# Patient Record
Sex: FEMALE | Race: WHITE | Employment: FULL TIME | RURAL
[De-identification: names, ages, dates, MRNs, and addresses within clinical notes are randomized per-mention and may not be internally consistent; named-entity substitution may affect disease eponyms.]

---

## 2017-02-16 ENCOUNTER — OFFICE VISIT (OUTPATIENT)
Dept: PRIMARY CARE CLINIC | Age: 47
End: 2017-02-16
Payer: COMMERCIAL

## 2017-02-16 ENCOUNTER — HOSPITAL ENCOUNTER (OUTPATIENT)
Dept: OTHER | Age: 47
Discharge: HOME | End: 2017-02-16
Attending: PEDIATRICS | Admitting: PEDIATRICS

## 2017-02-16 ENCOUNTER — TELEPHONE (OUTPATIENT)
Dept: PRIMARY CARE CLINIC | Age: 47
End: 2017-02-16

## 2017-02-16 VITALS
DIASTOLIC BLOOD PRESSURE: 72 MMHG | HEART RATE: 78 BPM | WEIGHT: 183.25 LBS | SYSTOLIC BLOOD PRESSURE: 126 MMHG | RESPIRATION RATE: 20 BRPM | OXYGEN SATURATION: 97 % | HEIGHT: 64 IN | BODY MASS INDEX: 31.28 KG/M2

## 2017-02-16 DIAGNOSIS — L30.8 PSORIASIFORM DERMATITIS: ICD-10-CM

## 2017-02-16 DIAGNOSIS — L50.9 URTICARIA: ICD-10-CM

## 2017-02-16 DIAGNOSIS — R45.1 AGITATION: ICD-10-CM

## 2017-02-16 DIAGNOSIS — F31.12 BIPOLAR 1 DISORDER, MANIC, MODERATE (HCC): Primary | ICD-10-CM

## 2017-02-16 DIAGNOSIS — G47.00 INSOMNIA, UNSPECIFIED TYPE: ICD-10-CM

## 2017-02-16 LAB
A/G RATIO: 2 (ref 0.8–2)
ALBUMIN SERPL-MCNC: 4.5 G/DL (ref 3.4–4.8)
ALP BLD-CCNC: 88 U/L (ref 25–100)
ALT SERPL-CCNC: 16 U/L (ref 4–36)
ANION GAP SERPL CALCULATED.3IONS-SCNC: 12 MMOL/L (ref 3–16)
AST SERPL-CCNC: 15 U/L (ref 8–33)
BASOPHILS ABSOLUTE: 0.1 K/UL (ref 0–0.1)
BASOPHILS RELATIVE PERCENT: 0.3 %
BILIRUB SERPL-MCNC: <0.2 MG/DL (ref 0.3–1.2)
BUN BLDV-MCNC: 12 MG/DL (ref 6–20)
CALCIUM SERPL-MCNC: 9.9 MG/DL (ref 8.5–10.5)
CHLORIDE BLD-SCNC: 98 MMOL/L (ref 98–107)
CHOLESTEROL, TOTAL: 204 MG/DL (ref 0–200)
CO2: 28 MMOL/L (ref 20–30)
CREAT SERPL-MCNC: 0.8 MG/DL (ref 0.4–1.2)
EOSINOPHILS ABSOLUTE: 0.4 K/UL (ref 0–0.4)
EOSINOPHILS RELATIVE PERCENT: 2.5 %
GFR AFRICAN AMERICAN: >59
GFR NON-AFRICAN AMERICAN: >60
GLOBULIN: 2.3 G/DL
GLUCOSE BLD-MCNC: 107 MG/DL (ref 74–106)
HCT VFR BLD CALC: 42 % (ref 37–47)
HDLC SERPL-MCNC: 28 MG/DL (ref 40–60)
HEMOGLOBIN: 13.3 G/DL (ref 11.5–16.5)
LDL CHOLESTEROL CALCULATED: ABNORMAL MG/DL
LDL CHOLESTEROL DIRECT: 131 MG/DL
LYMPHOCYTES ABSOLUTE: 3.8 K/UL (ref 1.5–4)
LYMPHOCYTES RELATIVE PERCENT: 26.3 % (ref 20–40)
MCH RBC QN AUTO: 29.4 PG (ref 27–32)
MCHC RBC AUTO-ENTMCNC: 31.7 G/DL (ref 31–35)
MCV RBC AUTO: 92.9 FL (ref 80–100)
MONOCYTES ABSOLUTE: 0.7 K/UL (ref 0.2–0.8)
MONOCYTES RELATIVE PERCENT: 5 % (ref 3–10)
NEUTROPHILS ABSOLUTE: 9.6 K/UL (ref 2–7.5)
NEUTROPHILS RELATIVE PERCENT: 65.9 %
PDW BLD-RTO: 14.1 % (ref 11–16)
PLATELET # BLD: 408 K/UL (ref 150–400)
PMV BLD AUTO: 10 FL (ref 6–10)
POTASSIUM SERPL-SCNC: 4.5 MMOL/L (ref 3.4–5.1)
RBC # BLD: 4.52 M/UL (ref 3.8–5.8)
SODIUM BLD-SCNC: 138 MMOL/L (ref 136–145)
TOTAL PROTEIN: 6.8 G/DL (ref 6.4–8.3)
TRIGL SERPL-MCNC: 432 MG/DL (ref 0–249)
TSH REFLEX: 0.48 UIU/ML (ref 0.35–5.5)
VLDLC SERPL CALC-MCNC: ABNORMAL MG/DL
WBC # BLD: 14.6 K/UL (ref 4–11)

## 2017-02-16 PROCEDURE — 99203 OFFICE O/P NEW LOW 30 MIN: CPT | Performed by: PEDIATRICS

## 2017-02-16 RX ORDER — FLUOXETINE HYDROCHLORIDE 20 MG/1
CAPSULE ORAL
COMMUNITY
Start: 2016-10-05 | End: 2017-02-16

## 2017-02-16 RX ORDER — SPIRONOLACTONE 25 MG/1
25 TABLET ORAL
COMMUNITY
Start: 2016-06-08 | End: 2017-03-09

## 2017-02-16 RX ORDER — TRIAMCINOLONE ACETONIDE 1 MG/G
CREAM TOPICAL
Qty: 30 G | Refills: 0 | Status: SHIPPED | OUTPATIENT
Start: 2017-02-16

## 2017-02-16 RX ORDER — BISOPROLOL FUMARATE 5 MG/1
TABLET ORAL
COMMUNITY
Start: 2016-06-10

## 2017-02-16 RX ORDER — CLONIDINE HYDROCHLORIDE 0.1 MG/1
0.1 TABLET ORAL 2 TIMES DAILY PRN
Qty: 60 TABLET | Refills: 3 | Status: SHIPPED | OUTPATIENT
Start: 2017-02-16 | End: 2017-06-11 | Stop reason: SDUPTHER

## 2017-02-16 RX ORDER — OXYCODONE AND ACETAMINOPHEN 7.5; 325 MG/1; MG/1
1 TABLET ORAL
COMMUNITY
Start: 2016-05-19 | End: 2017-03-09

## 2017-02-16 RX ORDER — HYDROXYZINE HYDROCHLORIDE 25 MG/1
25 TABLET, FILM COATED ORAL EVERY 8 HOURS PRN
Qty: 90 TABLET | Refills: 2 | Status: SHIPPED | OUTPATIENT
Start: 2017-02-16 | End: 2017-05-16 | Stop reason: SDUPTHER

## 2017-02-16 RX ORDER — OLANZAPINE AND FLUOXETINE 6; 50 MG/1; MG/1
1 CAPSULE ORAL NIGHTLY
Qty: 30 CAPSULE | Refills: 3 | Status: SHIPPED | OUTPATIENT
Start: 2017-02-16 | End: 2017-02-20

## 2017-02-20 DIAGNOSIS — F31.10 BIPOLAR DISORDER, MANIC (HCC): Primary | ICD-10-CM

## 2017-02-20 RX ORDER — OLANZAPINE 5 MG/1
5 TABLET ORAL NIGHTLY
Qty: 30 TABLET | Refills: 3 | Status: SHIPPED | OUTPATIENT
Start: 2017-02-20 | End: 2017-03-10 | Stop reason: SDUPTHER

## 2017-02-25 ASSESSMENT — ENCOUNTER SYMPTOMS
ABDOMINAL PAIN: 0
NAUSEA: 0
BACK PAIN: 0
VOMITING: 0
SINUS PRESSURE: 0
COUGH: 0
EYE DISCHARGE: 0
SORE THROAT: 0
WHEEZING: 0
SHORTNESS OF BREATH: 0

## 2017-02-28 ENCOUNTER — TELEPHONE (OUTPATIENT)
Dept: PRIMARY CARE CLINIC | Age: 47
End: 2017-02-28

## 2017-03-09 ENCOUNTER — OFFICE VISIT (OUTPATIENT)
Dept: PRIMARY CARE CLINIC | Age: 47
End: 2017-03-09
Payer: COMMERCIAL

## 2017-03-09 VITALS
HEART RATE: 93 BPM | OXYGEN SATURATION: 96 % | SYSTOLIC BLOOD PRESSURE: 124 MMHG | BODY MASS INDEX: 33.02 KG/M2 | HEIGHT: 64 IN | DIASTOLIC BLOOD PRESSURE: 76 MMHG | WEIGHT: 193.38 LBS | RESPIRATION RATE: 20 BRPM

## 2017-03-09 DIAGNOSIS — R60.0 EDEMA OF BOTH FEET: Primary | ICD-10-CM

## 2017-03-09 DIAGNOSIS — F31.10 BIPOLAR DISORDER, MANIC (HCC): ICD-10-CM

## 2017-03-09 DIAGNOSIS — E78.2 HYPERLIPIDEMIA, MIXED: ICD-10-CM

## 2017-03-09 DIAGNOSIS — E88.81 METABOLIC SYNDROME: ICD-10-CM

## 2017-03-09 LAB — HBA1C MFR BLD: 5.8 %

## 2017-03-09 PROCEDURE — 99213 OFFICE O/P EST LOW 20 MIN: CPT | Performed by: PEDIATRICS

## 2017-03-09 PROCEDURE — 83036 HEMOGLOBIN GLYCOSYLATED A1C: CPT | Performed by: PEDIATRICS

## 2017-03-09 RX ORDER — CLOBETASOL PROPIONATE 0.5 MG/G
OINTMENT TOPICAL
Refills: 0 | COMMUNITY
Start: 2017-03-07

## 2017-03-09 RX ORDER — CLOBETASOL PROPIONATE 0.05 G/100ML
SHAMPOO TOPICAL
COMMUNITY
Start: 2017-03-07

## 2017-03-09 RX ORDER — HYDROCHLOROTHIAZIDE 25 MG/1
25 TABLET ORAL DAILY
Qty: 30 TABLET | Refills: 3 | Status: SHIPPED | OUTPATIENT
Start: 2017-03-09 | End: 2017-07-03 | Stop reason: SDUPTHER

## 2017-03-09 RX ORDER — FUROSEMIDE 20 MG/1
20 TABLET ORAL DAILY PRN
Qty: 15 TABLET | Refills: 1 | Status: SHIPPED | OUTPATIENT
Start: 2017-03-09

## 2017-03-10 PROBLEM — E78.2 HYPERLIPIDEMIA, MIXED: Status: ACTIVE | Noted: 2017-03-10

## 2017-03-10 PROBLEM — E88.81 METABOLIC SYNDROME: Status: ACTIVE | Noted: 2017-03-10

## 2017-03-10 PROBLEM — E88.810 METABOLIC SYNDROME: Status: ACTIVE | Noted: 2017-03-10

## 2017-03-10 PROBLEM — R60.0 EDEMA OF BOTH FEET: Status: ACTIVE | Noted: 2017-03-10

## 2017-03-10 RX ORDER — OLANZAPINE 5 MG/1
2.5 TABLET ORAL NIGHTLY
Qty: 30 TABLET | Refills: 3 | Status: SHIPPED | OUTPATIENT
Start: 2017-03-10 | End: 2017-04-17

## 2017-03-10 ASSESSMENT — ENCOUNTER SYMPTOMS
NAUSEA: 0
WHEEZING: 0
EYE DISCHARGE: 0
SINUS PRESSURE: 0
ABDOMINAL PAIN: 0
SHORTNESS OF BREATH: 0
BACK PAIN: 0
VOMITING: 0
COUGH: 0
SORE THROAT: 0

## 2017-04-10 ENCOUNTER — HOSPITAL ENCOUNTER (OUTPATIENT)
Dept: OTHER | Age: 47
Discharge: HOME | End: 2017-04-10
Attending: PEDIATRICS | Admitting: PEDIATRICS

## 2017-04-10 ENCOUNTER — OFFICE VISIT (OUTPATIENT)
Dept: PRIMARY CARE CLINIC | Age: 47
End: 2017-04-10
Payer: COMMERCIAL

## 2017-04-10 VITALS
HEART RATE: 81 BPM | HEIGHT: 64 IN | SYSTOLIC BLOOD PRESSURE: 111 MMHG | OXYGEN SATURATION: 98 % | RESPIRATION RATE: 20 BRPM | DIASTOLIC BLOOD PRESSURE: 64 MMHG | WEIGHT: 192 LBS | BODY MASS INDEX: 32.78 KG/M2

## 2017-04-10 DIAGNOSIS — E78.2 HYPERLIPIDEMIA, MIXED: ICD-10-CM

## 2017-04-10 DIAGNOSIS — M25.471: ICD-10-CM

## 2017-04-10 DIAGNOSIS — M54.5 CHRONIC BILATERAL LOW BACK PAIN, WITH SCIATICA PRESENCE UNSPECIFIED: Primary | ICD-10-CM

## 2017-04-10 DIAGNOSIS — G89.29 CHRONIC BILATERAL LOW BACK PAIN, WITH SCIATICA PRESENCE UNSPECIFIED: Primary | ICD-10-CM

## 2017-04-10 PROBLEM — M54.50 CHRONIC BILATERAL LOW BACK PAIN: Status: ACTIVE | Noted: 2017-04-10

## 2017-04-10 PROCEDURE — 99213 OFFICE O/P EST LOW 20 MIN: CPT | Performed by: PEDIATRICS

## 2017-04-10 RX ORDER — LURASIDONE HYDROCHLORIDE 60 MG/1
TABLET, FILM COATED ORAL
COMMUNITY
Start: 2017-04-07

## 2017-04-11 LAB
BASOPHILS ABSOLUTE: 0.1 K/UL (ref 0–0.1)
BASOPHILS RELATIVE PERCENT: 0.7 %
CHOLESTEROL, TOTAL: 199 MG/DL (ref 0–200)
EOSINOPHILS ABSOLUTE: 0.4 K/UL (ref 0–0.4)
EOSINOPHILS RELATIVE PERCENT: 2.8 %
HCT VFR BLD CALC: 40.2 % (ref 37–47)
HDLC SERPL-MCNC: 28 MG/DL (ref 40–60)
HEMOGLOBIN: 13.2 G/DL (ref 11.5–16.5)
LDL CHOLESTEROL CALCULATED: 113 MG/DL
LYMPHOCYTES ABSOLUTE: 4 K/UL (ref 1.5–4)
LYMPHOCYTES RELATIVE PERCENT: 27.8 % (ref 20–40)
MCH RBC QN AUTO: 30.3 PG (ref 27–32)
MCHC RBC AUTO-ENTMCNC: 32.8 G/DL (ref 31–35)
MCV RBC AUTO: 92.4 FL (ref 80–100)
MONOCYTES ABSOLUTE: 0.8 K/UL (ref 0.2–0.8)
MONOCYTES RELATIVE PERCENT: 5.8 % (ref 3–10)
NEUTROPHILS ABSOLUTE: 9.1 K/UL (ref 2–7.5)
NEUTROPHILS RELATIVE PERCENT: 62.9 %
PDW BLD-RTO: 13.1 % (ref 11–16)
PLATELET # BLD: 438 K/UL (ref 150–400)
PMV BLD AUTO: 10.2 FL (ref 6–10)
RBC # BLD: 4.35 M/UL (ref 3.8–5.8)
SEDIMENTATION RATE, ERYTHROCYTE: 17 MM/HR (ref 0–20)
TRIGL SERPL-MCNC: 290 MG/DL (ref 0–249)
URIC ACID, SERUM: 7.6 MG/DL (ref 2.5–7.1)
VLDLC SERPL CALC-MCNC: 58 MG/DL
WBC # BLD: 14.4 K/UL (ref 4–11)

## 2017-04-14 ASSESSMENT — ENCOUNTER SYMPTOMS
BACK PAIN: 0
COUGH: 0
EYE DISCHARGE: 0
VOMITING: 0
WHEEZING: 0
SHORTNESS OF BREATH: 0
SINUS PRESSURE: 0
NAUSEA: 0
ABDOMINAL PAIN: 0
SORE THROAT: 0

## 2017-05-16 DIAGNOSIS — L50.9 URTICARIA: ICD-10-CM

## 2017-05-16 RX ORDER — HYDROXYZINE HYDROCHLORIDE 25 MG/1
TABLET, FILM COATED ORAL
Qty: 90 TABLET | Refills: 1 | Status: SHIPPED | OUTPATIENT
Start: 2017-05-16 | End: 2017-09-05 | Stop reason: SDUPTHER

## 2017-06-11 DIAGNOSIS — R45.1 AGITATION: ICD-10-CM

## 2017-06-12 RX ORDER — CLONIDINE HYDROCHLORIDE 0.1 MG/1
TABLET ORAL
Qty: 60 TABLET | Refills: 2 | Status: SHIPPED | OUTPATIENT
Start: 2017-06-12

## 2017-08-03 DIAGNOSIS — R60.0 EDEMA OF BOTH FEET: ICD-10-CM

## 2017-08-07 RX ORDER — HYDROCHLOROTHIAZIDE 25 MG/1
TABLET ORAL
Qty: 30 TABLET | Refills: 0 | Status: SHIPPED | OUTPATIENT
Start: 2017-08-07 | End: 2017-09-05 | Stop reason: SDUPTHER

## 2017-09-02 DIAGNOSIS — R60.0 EDEMA OF BOTH FEET: ICD-10-CM

## 2017-09-02 DIAGNOSIS — L50.9 URTICARIA: ICD-10-CM

## 2017-09-05 RX ORDER — HYDROXYZINE HYDROCHLORIDE 25 MG/1
TABLET, FILM COATED ORAL
Qty: 90 TABLET | Refills: 1 | Status: SHIPPED | OUTPATIENT
Start: 2017-09-05

## 2017-09-05 RX ORDER — HYDROCHLOROTHIAZIDE 25 MG/1
TABLET ORAL
Qty: 30 TABLET | Refills: 0 | Status: SHIPPED | OUTPATIENT
Start: 2017-09-05

## 2017-10-02 DIAGNOSIS — R60.0 EDEMA OF BOTH FEET: ICD-10-CM

## 2017-10-03 RX ORDER — HYDROCHLOROTHIAZIDE 25 MG/1
TABLET ORAL
Qty: 30 TABLET | Refills: 0 | Status: SHIPPED | OUTPATIENT
Start: 2017-10-03

## 2017-10-06 DIAGNOSIS — R45.1 AGITATION: ICD-10-CM

## 2017-10-07 RX ORDER — CLONIDINE HYDROCHLORIDE 0.1 MG/1
TABLET ORAL
Qty: 60 TABLET | Refills: 2 | Status: SHIPPED | OUTPATIENT
Start: 2017-10-07

## 2018-03-19 ENCOUNTER — TRANSCRIBE ORDERS (OUTPATIENT)
Dept: ADMINISTRATIVE | Facility: HOSPITAL | Age: 48
End: 2018-03-19

## 2018-03-19 DIAGNOSIS — R60.9 EDEMA, UNSPECIFIED TYPE: ICD-10-CM

## 2018-03-19 DIAGNOSIS — Z12.39 SCREENING BREAST EXAMINATION: Primary | ICD-10-CM

## 2018-03-22 ENCOUNTER — APPOINTMENT (OUTPATIENT)
Dept: CARDIOLOGY | Facility: HOSPITAL | Age: 48
End: 2018-03-22

## 2018-03-22 ENCOUNTER — APPOINTMENT (OUTPATIENT)
Dept: ULTRASOUND IMAGING | Facility: HOSPITAL | Age: 48
End: 2018-03-22

## 2018-04-19 ENCOUNTER — TRANSCRIBE ORDERS (OUTPATIENT)
Dept: ADMINISTRATIVE | Facility: HOSPITAL | Age: 48
End: 2018-04-19

## 2018-04-19 DIAGNOSIS — Z12.39 SCREENING BREAST EXAMINATION: Primary | ICD-10-CM

## 2018-05-07 ENCOUNTER — APPOINTMENT (OUTPATIENT)
Dept: ULTRASOUND IMAGING | Facility: HOSPITAL | Age: 48
End: 2018-05-07

## 2018-05-07 ENCOUNTER — APPOINTMENT (OUTPATIENT)
Dept: MAMMOGRAPHY | Facility: HOSPITAL | Age: 48
End: 2018-05-07

## 2018-05-22 ENCOUNTER — HOSPITAL ENCOUNTER (OUTPATIENT)
Dept: MAMMOGRAPHY | Facility: HOSPITAL | Age: 48
Discharge: HOME | End: 2018-05-22
Admitting: FAMILY MEDICINE

## 2018-05-22 DIAGNOSIS — Z12.39 SCREENING BREAST EXAMINATION: ICD-10-CM

## 2018-05-22 PROCEDURE — 77067 SCR MAMMO BI INCL CAD: CPT

## 2018-05-22 PROCEDURE — 77063 BREAST TOMOSYNTHESIS BI: CPT

## 2019-01-14 ENCOUNTER — TRANSCRIBE ORDERS (OUTPATIENT)
Dept: LAB | Facility: HOSPITAL | Age: 49
End: 2019-01-14

## 2019-01-14 ENCOUNTER — LAB (OUTPATIENT)
Dept: LAB | Facility: HOSPITAL | Age: 49
End: 2019-01-14

## 2019-01-14 ENCOUNTER — HOSPITAL ENCOUNTER (OUTPATIENT)
Dept: GENERAL RADIOLOGY | Facility: HOSPITAL | Age: 49
Discharge: HOME | End: 2019-01-14
Admitting: PHYSICIAN ASSISTANT

## 2019-01-14 DIAGNOSIS — L40.0 PSORIASIS VULGARIS: ICD-10-CM

## 2019-01-14 DIAGNOSIS — Z51.81 MEDICATION MONITORING ENCOUNTER: ICD-10-CM

## 2019-01-14 DIAGNOSIS — L40.0 PSORIASIS VULGARIS: Primary | ICD-10-CM

## 2019-01-14 LAB
ALBUMIN SERPL-MCNC: 4.6 G/DL (ref 3.5–5)
ALP SERPL-CCNC: 86 U/L (ref 38–126)
ALT SERPL W P-5'-P-CCNC: 22 U/L (ref 13–69)
AST SERPL-CCNC: 18 U/L (ref 15–46)
BASOPHILS # BLD AUTO: 0.1 10*3/MM3 (ref 0–0.2)
BASOPHILS NFR BLD AUTO: 0.7 % (ref 0–2.5)
BILIRUB CONJ SERPL-MCNC: 0.3 MG/DL (ref 0–0.4)
BILIRUB INDIRECT SERPL-MCNC: 0.2 MG/DL
BILIRUB SERPL-MCNC: 0.5 MG/DL (ref 0.2–1.3)
DEPRECATED RDW RBC AUTO: 45.6 FL (ref 37–54)
EOSINOPHIL # BLD AUTO: 0.44 10*3/MM3 (ref 0–0.7)
EOSINOPHIL NFR BLD AUTO: 2.9 % (ref 0–7)
ERYTHROCYTE [DISTWIDTH] IN BLOOD BY AUTOMATED COUNT: 12.5 % (ref 11.5–14.5)
HCT VFR BLD AUTO: 41.4 % (ref 37–47)
HGB BLD-MCNC: 13.1 G/DL (ref 12–16)
IMM GRANULOCYTES # BLD AUTO: 0.09 10*3/MM3 (ref 0–0.06)
IMM GRANULOCYTES NFR BLD AUTO: 0.6 % (ref 0–0.6)
LYMPHOCYTES # BLD AUTO: 2.59 10*3/MM3 (ref 0.6–3.4)
LYMPHOCYTES NFR BLD AUTO: 17.1 % (ref 10–50)
MCH RBC QN AUTO: 31.3 PG (ref 27–31)
MCHC RBC AUTO-ENTMCNC: 31.6 G/DL (ref 30–37)
MCV RBC AUTO: 99 FL (ref 81–99)
MONOCYTES # BLD AUTO: 0.62 10*3/MM3 (ref 0–0.9)
MONOCYTES NFR BLD AUTO: 4.1 % (ref 0–12)
NEUTROPHILS # BLD AUTO: 11.34 10*3/MM3 (ref 2–6.9)
NEUTROPHILS NFR BLD AUTO: 74.6 % (ref 37–80)
NRBC BLD AUTO-RTO: 0 /100 WBC (ref 0–0)
PLATELET # BLD AUTO: 359 10*3/MM3 (ref 130–400)
PMV BLD AUTO: 9.8 FL (ref 6–12)
PROT SERPL-MCNC: 7.3 G/DL (ref 6.3–8.2)
RBC # BLD AUTO: 4.18 10*6/MM3 (ref 4.2–5.4)
WBC NRBC COR # BLD: 15.18 10*3/MM3 (ref 4.8–10.8)

## 2019-01-14 PROCEDURE — 36415 COLL VENOUS BLD VENIPUNCTURE: CPT

## 2019-01-14 PROCEDURE — 86480 TB TEST CELL IMMUN MEASURE: CPT

## 2019-01-14 PROCEDURE — 85025 COMPLETE CBC W/AUTO DIFF WBC: CPT

## 2019-01-14 PROCEDURE — 86706 HEP B SURFACE ANTIBODY: CPT

## 2019-01-14 PROCEDURE — 87340 HEPATITIS B SURFACE AG IA: CPT

## 2019-01-14 PROCEDURE — 71046 X-RAY EXAM CHEST 2 VIEWS: CPT

## 2019-01-14 PROCEDURE — 80076 HEPATIC FUNCTION PANEL: CPT

## 2019-01-14 PROCEDURE — 86803 HEPATITIS C AB TEST: CPT

## 2019-01-15 LAB
HBV SURFACE AB SER QL: REACTIVE
HBV SURFACE AG SERPL QL IA: NEGATIVE
HCV AB S/CO SERPL IA: <0.1 S/CO RATIO (ref 0–0.9)

## 2019-01-17 LAB
QUANTIFERON CRITERIA: NORMAL
QUANTIFERON MITOGEN VALUE: >10 IU/ML
QUANTIFERON NIL VALUE: 0.06 IU/ML
QUANTIFERON TB1 AG VALUE: 0.07 IU/ML
QUANTIFERON TB2 AG VALUE: 0.05 IU/ML
QUANTIFERON-TB GOLD PLUS: NEGATIVE

## 2019-05-20 ENCOUNTER — TRANSCRIBE ORDERS (OUTPATIENT)
Dept: MAMMOGRAPHY | Facility: HOSPITAL | Age: 49
End: 2019-05-20

## 2019-05-20 DIAGNOSIS — Z12.39 BREAST CANCER SCREENING: Primary | ICD-10-CM

## 2019-07-15 ENCOUNTER — HOSPITAL ENCOUNTER (OUTPATIENT)
Dept: MAMMOGRAPHY | Facility: HOSPITAL | Age: 49
Discharge: HOME | End: 2019-07-15
Admitting: INTERNAL MEDICINE

## 2019-07-15 DIAGNOSIS — Z12.39 BREAST CANCER SCREENING: ICD-10-CM

## 2019-07-15 PROCEDURE — 77063 BREAST TOMOSYNTHESIS BI: CPT

## 2019-07-15 PROCEDURE — 77067 SCR MAMMO BI INCL CAD: CPT

## 2020-03-17 RX ORDER — HYDROXYZINE HYDROCHLORIDE 25 MG/1
TABLET, FILM COATED ORAL
Qty: 90 TABLET | Refills: 0 | OUTPATIENT
Start: 2020-03-17

## 2020-03-17 RX ORDER — CLONIDINE HYDROCHLORIDE 0.1 MG/1
TABLET ORAL
Qty: 60 TABLET | Refills: 0 | OUTPATIENT
Start: 2020-03-17

## 2020-03-17 RX ORDER — OLANZAPINE 5 MG/1
TABLET ORAL
Qty: 30 TABLET | Refills: 0 | OUTPATIENT
Start: 2020-03-17

## 2020-07-20 ENCOUNTER — TRANSCRIBE ORDERS (OUTPATIENT)
Dept: NUTRITION | Facility: HOSPITAL | Age: 50
End: 2020-07-20

## 2020-07-20 DIAGNOSIS — R63.5 WEIGHT GAIN: Primary | ICD-10-CM

## 2020-08-14 ENCOUNTER — HOSPITAL ENCOUNTER (OUTPATIENT)
Dept: NUTRITION | Facility: HOSPITAL | Age: 50
Discharge: HOME | End: 2020-08-14

## 2020-08-14 VITALS — HEIGHT: 64 IN | BODY MASS INDEX: 34.15 KG/M2 | WEIGHT: 200 LBS

## 2020-08-14 NOTE — PROGRESS NOTES
"Adult Outpatient Nutrition  Assessment/PES    Patient Name:  Ashleigh Hill  YOB: 1970  MRN: [id number]    Assessment Date:  8/14/2020    Comments:  RD spoke with pt for Telehealth appt focusing on recent weight gain. Due to contact limitation, unable to obtain signature. Pt given copies of forms, has acknowledged and agreed. Pt's current weight is 200 lbs. She states that she has gained weight since she quit smoking in November. She currently eats 3 meals daily one of which is usually take out. RD and pt reviewed MyPlate Method and benefits of each food group in proper portion sizes and varieties. Mindful Eating was also discussed focusing on importance of hunger and satiety cues. A 1500 kcal sample day menu was discussed and pt states that she could take her lunch to work more often and focus on healthy snacks throughout the day to help avoid over-eating. Pt drinks mostly water throughout the day ~67 ounces daily. She also walks every other day for ~30 minutes.    Pt asked appropriate questions related to nutrition which were addressed. Pt is not interested in scheduling a follow-up appointment at this time but has RD contact info and was encouraged to reach out with any questions or concerns in the future. Thank you for the initial referral.    General Info     Row Name 08/14/20 1421       Today's Session    Person(s) attending today's session  Patient     Services Used Today?  No       General Information    How Well Do You Speak English?  very well    Do You Speak a Language Other Than English at Home?  no    Preferred Language  English        Physical Findings     Row Name 08/14/20 1421          Physical Findings    Overall Physical Appearance  obese         Anthropometrics     Row Name 08/14/20 1421          Anthropometrics    Height  162.6 cm (64\")     Weight  90.7 kg (200 lb)        Ideal Body Weight (IBW)    Ideal Body Weight (IBW) (kg)  55     % Ideal Body Weight  164.93     " "   Body Mass Index (BMI)    BMI (kg/m2)  34.4         Nutritional Info/Activity     Row Name 08/14/20 1422       Nutritional Information    Have you had weight changes?  Yes    Describe weight changes  Gained weight    What is your desired body weight?  63.5 kg (140 lb)    Have you tried to lose weight before?  Yes    List programs tried, date, and success  Herbal Life, not much success Keto diet not much success    What is your motivation to lose weight?  fit into clothes    History of eating disorder?  No    List any food cravings/trigger foods you have  Sweet foods    Enter everything you can remember eating in the last 24 hours (1 day)  8 AM: Breakfast cookie with coffee and cream Noon - 1 PM:  chicken or fish wiht baked potato and green beans, water 7:30 PM: Seafood - shrimp, corn, green beans, water ice cream       Physical Activity    Are you currently involved in an activity/exercise program?   Yes    Describe physical activity  Walking around neighborhood every other night ~30 minutes          Estimated/Assessed Needs     Row Name 08/14/20 1423 08/14/20 1421       Calculation Measurements    Weight Used For Calculations  90.7 kg (200 lb)  --    Height  --  162.6 cm (64\")       Estimated/Assessed Needs    Additional Documentation  Fluid Requirements (Group);[place]-St. Jeor Equation (Group);Protein Requirements (Group);Calorie Requirements (Group)  --       Calorie Requirements    Estimated Calorie Need Method  [place]-St Jeor  --    Estimated Calorie Requirement Comment  1500 - 1700  --       [place]-St. Jeor Equation    RMR ([place]-St. Jeor Equation)  1507.19  --       Protein Requirements    Weight Used For Protein Calculations  90.7 kg (200 lb)  --    Est Protein Requirement Amount (gms/kg)  1.0 gm protein 73 - 90 gm  --    Estimated Protein Requirements (gms/day)  90.72  --       Fluid Requirements    Estimated Fluid Requirement Method  [place]-Segar Formula  --    [place]-Segar Method (over " 20 kg)  3314.38  --                Problem/Interventions:  Problem 1     Row Name 08/14/20 1425          Nutrition Diagnoses Problem 1    Problem 1  Overweight/Obesity     Etiology (related to)  Factors Affecting Nutrition     Food Habit/Preferences  Large Meals     Signs/Symptoms (evidenced by)  BMI     BMI  30 - 34.9                 Intervention Goal     Row Name 08/14/20 1425          Intervention Goal    General  Meet nutritional needs for age/condition     PO  Meet estimated needs     Weight  Appropriate weight loss           Nutrition Prescription     Row Name 08/14/20 1426          Nutrition Prescription PO    PO Prescription  Begin/change diet     Begin/Change Diet to  Regular     Common Modifiers  Cardiac     New PO Prescription Ordered?  No, recommended         Education/Evaluation     Row Name 08/14/20 1426          Education    Education  Provided education regarding     Provided education regarding  Key food habit change;Nutrition for age;Diet rationale           Electronically signed by:  Queta Mckay RD  08/14/20 14:26

## 2020-10-06 ENCOUNTER — TRANSCRIBE ORDERS (OUTPATIENT)
Dept: LAB | Facility: HOSPITAL | Age: 50
End: 2020-10-06

## 2020-10-06 ENCOUNTER — HOSPITAL ENCOUNTER (OUTPATIENT)
Dept: GENERAL RADIOLOGY | Facility: HOSPITAL | Age: 50
Discharge: HOME | End: 2020-10-06

## 2020-10-06 ENCOUNTER — LAB (OUTPATIENT)
Dept: LAB | Facility: HOSPITAL | Age: 50
End: 2020-10-06

## 2020-10-06 DIAGNOSIS — J43.9 PULMONARY EMPHYSEMA, UNSPECIFIED EMPHYSEMA TYPE (HCC): Primary | ICD-10-CM

## 2020-10-06 DIAGNOSIS — J43.9 PULMONARY EMPHYSEMA, UNSPECIFIED EMPHYSEMA TYPE (HCC): ICD-10-CM

## 2020-10-06 DIAGNOSIS — L40.0 PSORIASIS VULGARIS: ICD-10-CM

## 2020-10-06 DIAGNOSIS — L40.1 IMPETIGO HERPETIFORMIS: ICD-10-CM

## 2020-10-06 LAB
ALBUMIN SERPL-MCNC: 4.5 G/DL (ref 3.5–5.2)
ALBUMIN/GLOB SERPL: 1.6 G/DL
ALP SERPL-CCNC: 89 U/L (ref 39–117)
ALT SERPL W P-5'-P-CCNC: 38 U/L (ref 1–33)
ANION GAP SERPL CALCULATED.3IONS-SCNC: 8.7 MMOL/L (ref 5–15)
AST SERPL-CCNC: 29 U/L (ref 1–32)
BASOPHILS # BLD AUTO: 0.16 10*3/MM3 (ref 0–0.2)
BASOPHILS NFR BLD AUTO: 1 % (ref 0–1.5)
BILIRUB CONJ SERPL-MCNC: <0.2 MG/DL (ref 0–0.3)
BILIRUB SERPL-MCNC: 0.2 MG/DL (ref 0–1.2)
BUN SERPL-MCNC: 13 MG/DL (ref 6–20)
BUN/CREAT SERPL: 10.4 (ref 7–25)
CALCIUM SPEC-SCNC: 10.1 MG/DL (ref 8.6–10.5)
CHLORIDE SERPL-SCNC: 101 MMOL/L (ref 98–107)
CHOLEST SERPL-MCNC: 220 MG/DL (ref 0–200)
CO2 SERPL-SCNC: 25.3 MMOL/L (ref 22–29)
CREAT SERPL-MCNC: 1.25 MG/DL (ref 0.57–1)
DEPRECATED RDW RBC AUTO: 39.5 FL (ref 37–54)
EOSINOPHIL # BLD AUTO: 0.63 10*3/MM3 (ref 0–0.4)
EOSINOPHIL NFR BLD AUTO: 3.9 % (ref 0.3–6.2)
ERYTHROCYTE [DISTWIDTH] IN BLOOD BY AUTOMATED COUNT: 11.7 % (ref 12.3–15.4)
GFR SERPL CREATININE-BSD FRML MDRD: 45 ML/MIN/1.73
GGT SERPL-CCNC: 26 U/L (ref 5–36)
GLOBULIN UR ELPH-MCNC: 2.8 GM/DL
GLUCOSE SERPL-MCNC: 87 MG/DL (ref 65–99)
HAV IGM SERPL QL IA: NORMAL
HBV CORE IGM SERPL QL IA: NORMAL
HBV SURFACE AG SERPL QL IA: NORMAL
HCT VFR BLD AUTO: 40.8 % (ref 34–46.6)
HCV AB SER DONR QL: NORMAL
HGB BLD-MCNC: 13.6 G/DL (ref 12–15.9)
HIV1 P24 AG SER QL: NORMAL
HIV1+2 AB SER QL: NORMAL
IMM GRANULOCYTES # BLD AUTO: 0.09 10*3/MM3 (ref 0–0.05)
IMM GRANULOCYTES NFR BLD AUTO: 0.6 % (ref 0–0.5)
LYMPHOCYTES # BLD AUTO: 3.53 10*3/MM3 (ref 0.7–3.1)
LYMPHOCYTES NFR BLD AUTO: 21.8 % (ref 19.6–45.3)
MCH RBC QN AUTO: 30.8 PG (ref 26.6–33)
MCHC RBC AUTO-ENTMCNC: 33.3 G/DL (ref 31.5–35.7)
MCV RBC AUTO: 92.3 FL (ref 79–97)
MONOCYTES # BLD AUTO: 0.77 10*3/MM3 (ref 0.1–0.9)
MONOCYTES NFR BLD AUTO: 4.8 % (ref 5–12)
NEUTROPHILS NFR BLD AUTO: 11.01 10*3/MM3 (ref 1.7–7)
NEUTROPHILS NFR BLD AUTO: 67.9 % (ref 42.7–76)
NRBC BLD AUTO-RTO: 0 /100 WBC (ref 0–0.2)
PLATELET # BLD AUTO: 426 10*3/MM3 (ref 140–450)
PMV BLD AUTO: 10.3 FL (ref 6–12)
POTASSIUM SERPL-SCNC: 4.1 MMOL/L (ref 3.5–5.2)
PROT SERPL-MCNC: 7.3 G/DL (ref 6–8.5)
RBC # BLD AUTO: 4.42 10*6/MM3 (ref 3.77–5.28)
SODIUM SERPL-SCNC: 135 MMOL/L (ref 136–145)
TRIGL SERPL-MCNC: 447 MG/DL (ref 0–150)
WBC # BLD AUTO: 16.19 10*3/MM3 (ref 3.4–10.8)

## 2020-10-06 PROCEDURE — G0432 EIA HIV-1/HIV-2 SCREEN: HCPCS

## 2020-10-06 PROCEDURE — 80074 ACUTE HEPATITIS PANEL: CPT

## 2020-10-06 PROCEDURE — 86480 TB TEST CELL IMMUN MEASURE: CPT

## 2020-10-06 PROCEDURE — 82248 BILIRUBIN DIRECT: CPT

## 2020-10-06 PROCEDURE — 85025 COMPLETE CBC W/AUTO DIFF WBC: CPT

## 2020-10-06 PROCEDURE — 80053 COMPREHEN METABOLIC PANEL: CPT

## 2020-10-06 PROCEDURE — 82977 ASSAY OF GGT: CPT

## 2020-10-06 PROCEDURE — 82465 ASSAY BLD/SERUM CHOLESTEROL: CPT

## 2020-10-06 PROCEDURE — 87899 AGENT NOS ASSAY W/OPTIC: CPT

## 2020-10-06 PROCEDURE — 84478 ASSAY OF TRIGLYCERIDES: CPT

## 2020-10-06 PROCEDURE — 71046 X-RAY EXAM CHEST 2 VIEWS: CPT

## 2020-10-06 PROCEDURE — 36415 COLL VENOUS BLD VENIPUNCTURE: CPT

## 2020-10-09 LAB
GAMMA INTERFERON BACKGROUND BLD IA-ACNC: 0.05 IU/ML
M TB IFN-G BLD-IMP: NEGATIVE
M TB IFN-G CD4+ BCKGRND COR BLD-ACNC: 0.06 IU/ML
M TB IFN-G CD4+CD8+ BCKGRND COR BLD-ACNC: 0.06 IU/ML
MITOGEN IGNF BLD-ACNC: >10 IU/ML
SERVICE CMNT-IMP: NORMAL

## 2021-04-29 ENCOUNTER — TRANSCRIBE ORDERS (OUTPATIENT)
Dept: ADMINISTRATIVE | Facility: HOSPITAL | Age: 51
End: 2021-04-29

## 2021-04-29 DIAGNOSIS — Z12.31 VISIT FOR SCREENING MAMMOGRAM: Primary | ICD-10-CM

## 2021-05-13 ENCOUNTER — TRANSCRIBE ORDERS (OUTPATIENT)
Dept: ADMINISTRATIVE | Facility: HOSPITAL | Age: 51
End: 2021-05-13

## 2021-05-13 DIAGNOSIS — Z12.31 BREAST CANCER SCREENING BY MAMMOGRAM: Primary | ICD-10-CM

## 2021-05-19 ENCOUNTER — TRANSCRIBE ORDERS (OUTPATIENT)
Dept: ADMINISTRATIVE | Facility: HOSPITAL | Age: 51
End: 2021-05-19

## 2021-08-20 ENCOUNTER — TRANSCRIBE ORDERS (OUTPATIENT)
Dept: ADMINISTRATIVE | Facility: HOSPITAL | Age: 51
End: 2021-08-20

## 2021-08-20 DIAGNOSIS — R41.0 CONFUSION: Primary | ICD-10-CM

## 2021-08-27 ENCOUNTER — HOSPITAL ENCOUNTER (OUTPATIENT)
Dept: MAMMOGRAPHY | Facility: HOSPITAL | Age: 51
Discharge: HOME | End: 2021-08-27
Admitting: INTERNAL MEDICINE

## 2021-08-27 DIAGNOSIS — Z12.31 BREAST CANCER SCREENING BY MAMMOGRAM: ICD-10-CM

## 2021-08-27 PROCEDURE — 77063 BREAST TOMOSYNTHESIS BI: CPT

## 2021-08-27 PROCEDURE — 77067 SCR MAMMO BI INCL CAD: CPT

## 2021-09-02 ENCOUNTER — APPOINTMENT (OUTPATIENT)
Dept: CT IMAGING | Facility: HOSPITAL | Age: 51
End: 2021-09-02

## 2021-09-10 ENCOUNTER — HOSPITAL ENCOUNTER (OUTPATIENT)
Dept: CT IMAGING | Facility: HOSPITAL | Age: 51
Discharge: HOME | End: 2021-09-10
Admitting: INTERNAL MEDICINE

## 2021-09-10 DIAGNOSIS — R41.0 CONFUSION: ICD-10-CM

## 2021-09-10 PROCEDURE — 70450 CT HEAD/BRAIN W/O DYE: CPT

## 2021-09-24 ENCOUNTER — TRANSCRIBE ORDERS (OUTPATIENT)
Dept: LAB | Facility: HOSPITAL | Age: 51
End: 2021-09-24

## 2021-09-24 ENCOUNTER — LAB (OUTPATIENT)
Dept: LAB | Facility: HOSPITAL | Age: 51
End: 2021-09-24

## 2021-09-24 ENCOUNTER — HOSPITAL ENCOUNTER (OUTPATIENT)
Dept: GENERAL RADIOLOGY | Facility: HOSPITAL | Age: 51
Discharge: HOME | End: 2021-09-24

## 2021-09-24 ENCOUNTER — TRANSCRIBE ORDERS (OUTPATIENT)
Dept: GENERAL RADIOLOGY | Facility: HOSPITAL | Age: 51
End: 2021-09-24

## 2021-09-24 DIAGNOSIS — L40.1 PUSTULAR PSORIASIS: ICD-10-CM

## 2021-09-24 DIAGNOSIS — L40.0 PSORIASIS VULGARIS: Primary | ICD-10-CM

## 2021-09-24 DIAGNOSIS — L40.0 PSORIASIS VULGARIS: ICD-10-CM

## 2021-09-24 DIAGNOSIS — J43.9 PULMONARY EMPHYSEMA, UNSPECIFIED EMPHYSEMA TYPE (HCC): Primary | ICD-10-CM

## 2021-09-24 DIAGNOSIS — J43.9 PULMONARY EMPHYSEMA, UNSPECIFIED EMPHYSEMA TYPE (HCC): ICD-10-CM

## 2021-09-24 LAB
ALBUMIN SERPL-MCNC: 4.4 G/DL (ref 3.5–5.2)
ALBUMIN/GLOB SERPL: 1.6 G/DL
ALP SERPL-CCNC: 84 U/L (ref 39–117)
ALT SERPL W P-5'-P-CCNC: 27 U/L (ref 1–33)
ANION GAP SERPL CALCULATED.3IONS-SCNC: 9.9 MMOL/L (ref 5–15)
AST SERPL-CCNC: 20 U/L (ref 1–32)
BASOPHILS # BLD AUTO: 0.17 10*3/MM3 (ref 0–0.2)
BASOPHILS NFR BLD AUTO: 1.2 % (ref 0–1.5)
BILIRUB CONJ SERPL-MCNC: <0.2 MG/DL (ref 0–0.3)
BILIRUB SERPL-MCNC: 0.2 MG/DL (ref 0–1.2)
BUN SERPL-MCNC: 13 MG/DL (ref 6–20)
BUN/CREAT SERPL: 12.1 (ref 7–25)
CALCIUM SPEC-SCNC: 10.2 MG/DL (ref 8.6–10.5)
CHLORIDE SERPL-SCNC: 103 MMOL/L (ref 98–107)
CO2 SERPL-SCNC: 26.1 MMOL/L (ref 22–29)
CREAT SERPL-MCNC: 1.07 MG/DL (ref 0.57–1)
DEPRECATED RDW RBC AUTO: 40.3 FL (ref 37–54)
EOSINOPHIL # BLD AUTO: 0.75 10*3/MM3 (ref 0–0.4)
EOSINOPHIL NFR BLD AUTO: 5.3 % (ref 0.3–6.2)
ERYTHROCYTE [DISTWIDTH] IN BLOOD BY AUTOMATED COUNT: 11.8 % (ref 12.3–15.4)
GFR SERPL CREATININE-BSD FRML MDRD: 54 ML/MIN/1.73
GLOBULIN UR ELPH-MCNC: 2.8 GM/DL
GLUCOSE SERPL-MCNC: 103 MG/DL (ref 65–99)
HCT VFR BLD AUTO: 39.3 % (ref 34–46.6)
HGB BLD-MCNC: 13 G/DL (ref 12–15.9)
IMM GRANULOCYTES # BLD AUTO: 0.09 10*3/MM3 (ref 0–0.05)
IMM GRANULOCYTES NFR BLD AUTO: 0.6 % (ref 0–0.5)
LYMPHOCYTES # BLD AUTO: 3.96 10*3/MM3 (ref 0.7–3.1)
LYMPHOCYTES NFR BLD AUTO: 27.8 % (ref 19.6–45.3)
MCH RBC QN AUTO: 30.7 PG (ref 26.6–33)
MCHC RBC AUTO-ENTMCNC: 33.1 G/DL (ref 31.5–35.7)
MCV RBC AUTO: 92.7 FL (ref 79–97)
MONOCYTES # BLD AUTO: 0.77 10*3/MM3 (ref 0.1–0.9)
MONOCYTES NFR BLD AUTO: 5.4 % (ref 5–12)
NEUTROPHILS NFR BLD AUTO: 59.7 % (ref 42.7–76)
NEUTROPHILS NFR BLD AUTO: 8.49 10*3/MM3 (ref 1.7–7)
NRBC BLD AUTO-RTO: 0 /100 WBC (ref 0–0.2)
PLATELET # BLD AUTO: 368 10*3/MM3 (ref 140–450)
PMV BLD AUTO: 10.4 FL (ref 6–12)
POTASSIUM SERPL-SCNC: 4.5 MMOL/L (ref 3.5–5.2)
PROT SERPL-MCNC: 7.2 G/DL (ref 6–8.5)
RBC # BLD AUTO: 4.24 10*6/MM3 (ref 3.77–5.28)
SODIUM SERPL-SCNC: 139 MMOL/L (ref 136–145)
WBC # BLD AUTO: 14.23 10*3/MM3 (ref 3.4–10.8)

## 2021-09-24 PROCEDURE — 36415 COLL VENOUS BLD VENIPUNCTURE: CPT

## 2021-09-24 PROCEDURE — 80053 COMPREHEN METABOLIC PANEL: CPT

## 2021-09-24 PROCEDURE — 86480 TB TEST CELL IMMUN MEASURE: CPT

## 2021-09-24 PROCEDURE — 71046 X-RAY EXAM CHEST 2 VIEWS: CPT

## 2021-09-24 PROCEDURE — 85025 COMPLETE CBC W/AUTO DIFF WBC: CPT

## 2021-09-24 PROCEDURE — 82248 BILIRUBIN DIRECT: CPT

## 2021-09-29 LAB
GAMMA INTERFERON BACKGROUND BLD IA-ACNC: 0.12 IU/ML
M TB IFN-G BLD-IMP: NEGATIVE
M TB IFN-G CD4+ BCKGRND COR BLD-ACNC: 0.05 IU/ML
M TB IFN-G CD4+CD8+ BCKGRND COR BLD-ACNC: 0.11 IU/ML
MITOGEN IGNF BLD-ACNC: >10 IU/ML
SERVICE CMNT-IMP: NORMAL

## 2022-08-11 RX ORDER — LOSARTAN POTASSIUM 50 MG/1
50 TABLET ORAL DAILY
COMMUNITY

## 2022-08-11 RX ORDER — FUROSEMIDE 20 MG/1
20 TABLET ORAL DAILY PRN
COMMUNITY

## 2022-08-11 RX ORDER — PHENTERMINE HYDROCHLORIDE 37.5 MG/1
37.5 TABLET ORAL
COMMUNITY
End: 2023-01-12

## 2022-08-11 RX ORDER — PROPRANOLOL HYDROCHLORIDE 20 MG/1
60 TABLET ORAL DAILY
COMMUNITY
End: 2022-08-17

## 2022-08-11 RX ORDER — OXYBUTYNIN CHLORIDE 5 MG/1
5 TABLET, EXTENDED RELEASE ORAL DAILY
COMMUNITY
End: 2022-08-17

## 2022-08-11 RX ORDER — MECLIZINE HYDROCHLORIDE 25 MG/1
25 TABLET ORAL 3 TIMES DAILY PRN
COMMUNITY
End: 2022-08-17

## 2022-08-11 RX ORDER — BUPROPION HYDROCHLORIDE 300 MG/1
300 TABLET ORAL DAILY
COMMUNITY

## 2022-08-11 RX ORDER — TRAZODONE HYDROCHLORIDE 50 MG/1
50 TABLET ORAL NIGHTLY PRN
COMMUNITY

## 2022-08-11 RX ORDER — LITHIUM CARBONATE 300 MG/1
300 CAPSULE ORAL TAKE AS DIRECTED
COMMUNITY
End: 2022-10-26 | Stop reason: HOSPADM

## 2022-08-11 RX ORDER — LORATADINE 10 MG/1
10 TABLET ORAL DAILY
COMMUNITY
End: 2022-08-17

## 2022-08-17 ENCOUNTER — OFFICE VISIT (OUTPATIENT)
Dept: GASTROENTEROLOGY | Facility: CLINIC | Age: 52
End: 2022-08-17

## 2022-08-17 VITALS
HEART RATE: 104 BPM | SYSTOLIC BLOOD PRESSURE: 124 MMHG | HEIGHT: 64 IN | TEMPERATURE: 99.1 F | BODY MASS INDEX: 34.66 KG/M2 | WEIGHT: 203 LBS | DIASTOLIC BLOOD PRESSURE: 87 MMHG

## 2022-08-17 DIAGNOSIS — K59.04 CHRONIC IDIOPATHIC CONSTIPATION: ICD-10-CM

## 2022-08-17 DIAGNOSIS — K62.5 RECTAL BLEEDING: ICD-10-CM

## 2022-08-17 DIAGNOSIS — Z86.010 HISTORY OF COLON POLYPS: ICD-10-CM

## 2022-08-17 DIAGNOSIS — K21.9 GASTROESOPHAGEAL REFLUX DISEASE, UNSPECIFIED WHETHER ESOPHAGITIS PRESENT: ICD-10-CM

## 2022-08-17 DIAGNOSIS — Z01.818 PREOP TESTING: Primary | ICD-10-CM

## 2022-08-17 DIAGNOSIS — Z83.71 FAMILY HISTORY OF POLYPS IN THE COLON: ICD-10-CM

## 2022-08-17 DIAGNOSIS — R11.2 NAUSEA AND VOMITING, UNSPECIFIED VOMITING TYPE: Primary | ICD-10-CM

## 2022-08-17 PROCEDURE — 99244 OFF/OP CNSLTJ NEW/EST MOD 40: CPT | Performed by: PHYSICIAN ASSISTANT

## 2022-08-17 RX ORDER — TOPIRAMATE 25 MG/1
25 TABLET ORAL DAILY
COMMUNITY
Start: 2022-08-05 | End: 2022-10-26 | Stop reason: HOSPADM

## 2022-08-17 RX ORDER — PANTOPRAZOLE SODIUM 40 MG/1
40 TABLET, DELAYED RELEASE ORAL DAILY
Qty: 30 TABLET | Refills: 3 | Status: SHIPPED | OUTPATIENT
Start: 2022-08-17 | End: 2022-12-05

## 2022-08-17 RX ORDER — SODIUM CHLORIDE 9 MG/ML
30 INJECTION, SOLUTION INTRAVENOUS CONTINUOUS PRN
Status: CANCELLED | OUTPATIENT
Start: 2022-08-17

## 2022-08-17 RX ORDER — HYDROCHLOROTHIAZIDE 25 MG/1
25 TABLET ORAL DAILY
COMMUNITY
Start: 2022-08-02 | End: 2022-10-26 | Stop reason: HOSPADM

## 2022-08-17 RX ORDER — HYDROCORTISONE 25 MG/G
CREAM TOPICAL 2 TIMES DAILY
Qty: 28 G | Refills: 1 | Status: SHIPPED | OUTPATIENT
Start: 2022-08-17

## 2022-08-17 RX ORDER — BISACODYL 5 MG
TABLET, DELAYED RELEASE (ENTERIC COATED) ORAL
Qty: 4 TABLET | Refills: 0 | Status: SHIPPED | OUTPATIENT
Start: 2022-08-17 | End: 2022-10-26 | Stop reason: HOSPADM

## 2022-08-17 RX ORDER — CHOLECALCIFEROL (VITAMIN D3) 50 MCG
1 TABLET ORAL DAILY
COMMUNITY
Start: 2022-08-02

## 2022-08-17 RX ORDER — ONDANSETRON 4 MG/1
4 TABLET, ORALLY DISINTEGRATING ORAL EVERY 8 HOURS PRN
Qty: 42 TABLET | Refills: 1 | Status: SHIPPED | OUTPATIENT
Start: 2022-08-17 | End: 2022-12-24

## 2022-08-17 NOTE — PROGRESS NOTES
New Patient Consult      Date: 2022   Patient Name: Ashleigh Hill  MRN: [id number]  : 1970     Primary Care Provider: Livan Boston MD  Referring Provider: Ludy    Chief Complaint   Patient presents with   • Rectal Bleeding   • Vomiting     History of Present Illness: Ashleigh Hill is a 52 y.o. female who is here today as a consultation with Gastroenterology for evaluation of Rectal Bleeding and Vomiting.    She has been having rectal bleeding for the past 3-4 months, it is bright red in color. Blood is in the stool, on the tissue and in the toilet water. Bleeding only occurs with a BM. She has been told in the past that she has hemorrhodis. She has had a hemorrhoid surgery in the past approx 5 years. She has constipation which aggravates the bleeding. BMs are occurring every 2-3 days and hard with straining. Has tried taking dulcolax, miralax without relief. Also has associated bloating.     Having nausea with vomiting 2-3 times per week. This started approx 3 weeks ago. This is occurring mostly in the mornings, not after eating. She tried taking her medications with food as a way to help with this but she has had vomiting even before taking her medications. Has increased belching. No heartburn. No current reflux medications. She does wake up at night with sensation that something is caught in her throat. No dysphagia. Had EGD several years ago, was told she had a small hiatal hernia, completed by Dr. Landry.     Last colonoscopy was 7-8 years ago and reports having over 20 colon polyps removed then. There is no known family history of colon cancer. Mother had colon polyps.     Subjective      Past Medical History:   Diagnosis Date   • Abdominal pain    • Abnormal weight gain    • Anxiety    • Bipolar affective disorder (HCC)    • Cholelithiasis     removed   • Colon polyp     had over 20 polyps   • Depression    • Elevated white blood cell count    • Excessive  sweating    • Hematuria    • Hemorrhoids    • Hiatal hernia    • History of psoriasis    • History of thyroid nodule    • Hyperlipidemia    • Kidney stone    • Leukocytosis    • Lumbago    • Migraines    • Nephrolithiasis     left   • Ovarian cyst     left   • Porphyria (HCC)    • Prediabetes    • Sleep apnea    • Snores    • Tobacco use    • Toe infection    • Upper respiratory infection    • Vaginitis    • Visit for screening mammogram      Past Surgical History:   Procedure Laterality Date   • ABDOMINOPLASTY     • BREAST RECONSTRUCTION     •  SECTION      x2   • CHOLECYSTECTOMY     • COLONOSCOPY     • ENDOMETRIAL ABLATION     • HEMORRHOIDECTOMY  2018    had 3 removed   • KIDNEY STONE SURGERY     • TONSILLECTOMY     • TUBAL ABDOMINAL LIGATION       Family History   Problem Relation Age of Onset   • Arthritis Mother    • Diabetes Mother    • Hypertension Mother    • Osteoporosis Mother    • Thyroid disease Mother    • Hyperlipidemia Mother    • Colon polyps Mother    • Cancer Father    • Kidney disease Father    • Breast cancer Maternal Aunt    • Colon cancer Neg Hx      Social History     Socioeconomic History   • Marital status:    Tobacco Use   • Smoking status: Former Smoker     Packs/day: 1.00     Years: 30.00     Pack years: 30.00     Types: Cigarettes     Start date: 1985     Quit date: 2020     Years since quittin.7   • Smokeless tobacco: Never Used   Vaping Use   • Vaping Use: Never used   Substance and Sexual Activity   • Alcohol use: Yes     Comment: rarely 2 times per year   • Drug use: Never   • Sexual activity: Yes     Partners: Male     Birth control/protection: Tubal ligation       Current Outpatient Medications:   •  buPROPion XL (WELLBUTRIN XL) 300 MG 24 hr tablet, Take 300 mg by mouth Daily., Disp: , Rfl:   •  Cholecalciferol (Vitamin D) 50 MCG (2000 UT) tablet, 1 tablet Daily., Disp: , Rfl:   •  Ezetimibe (ZETIA PO), Take 10 mg by mouth  Daily., Disp: , Rfl:   •  FLUoxetine (PROzac) 20 MG capsule, Take 3 capsules daily (Patient taking differently: Take 40 mg by mouth Daily. Take 3 capsules daily), Disp: 270 capsule, Rfl: 2  •  furosemide (LASIX) 20 MG tablet, Take 20 mg by mouth Daily. PRN, Disp: , Rfl:   •  hydroCHLOROthiazide (HYDRODIURIL) 25 MG tablet, Take 25 mg by mouth Daily., Disp: , Rfl:   •  Ixekizumab (TALTZ SC), Inject 80 mg under the skin into the appropriate area as directed Every 28 (Twenty-Eight) Days., Disp: , Rfl:   •  lithium carbonate 300 MG capsule, Take 300 mg by mouth Take As Directed. 1 po in am, 2 in the pm, Disp: , Rfl:   •  losartan (COZAAR) 50 MG tablet, Take 50 mg by mouth Daily., Disp: , Rfl:   •  metFORMIN (GLUCOPHAGE) 500 MG tablet, , Disp: , Rfl:   •  METOPROLOL SUCCINATE ER PO, Take 1 tablet by mouth., Disp: , Rfl:   •  Misc. Devices (SITZ BATH) misc, Use with warm water 2-3 times daily for 15-20 minutes each, Disp: 1 each, Rfl: 0  •  phentermine (ADIPEX-P) 37.5 MG tablet, Take 37.5 mg by mouth Every Morning Before Breakfast., Disp: , Rfl:   •  Semaglutide (OZEMPIC, 2 MG/DOSE, SC), Inject  under the skin into the appropriate area as directed 1 (One) Time Per Week., Disp: , Rfl:   •  topiramate (TOPAMAX) 25 MG tablet, Take 25 mg by mouth Daily., Disp: , Rfl:   •  bisacodyl (Dulcolax) 5 MG EC tablet, Follow instructions given at office, Disp: 4 tablet, Rfl: 0  •  Hydrocortisone, Perianal, (Anusol-HC) 2.5 % rectal cream, Insert  into the rectum 2 (Two) Times a Day., Disp: 28 g, Rfl: 1  •  linaclotide (Linzess) 72 MCG capsule capsule, Take 1 capsule by mouth Every Morning Before Breakfast. Wait 30 mins before eating., Disp: 30 capsule, Rfl: 5  •  linaclotide (Linzess) 72 MCG capsule capsule, Take 1 capsule by mouth Every Morning Before Breakfast. Wait 30 mins before eating., Disp: 12 capsule, Rfl: 0  •  ondansetron ODT (ZOFRAN-ODT) 4 MG disintegrating tablet, Place 1 tablet on the tongue Every 8 (Eight) Hours As Needed  for Nausea or Vomiting., Disp: 42 tablet, Rfl: 1  •  pantoprazole (PROTONIX) 40 MG EC tablet, Take 1 tablet by mouth Daily. Take in the morning 30 minutes before breakfast, Disp: 30 tablet, Rfl: 3  •  polyethylene glycol (GoLYTELY) 236 g solution, Follow instructions given at office, Disp: 4000 mL, Rfl: 0  •  traZODone (DESYREL) 50 MG tablet, Take 50 mg by mouth Every Night., Disp: , Rfl:     Allergies   Allergen Reactions   • Bee Venom Anaphylaxis   • Codeine        The following portions of the patient's history were reviewed and updated as appropriate: allergies, current medications, past family history, past medical history, past social history, past surgical history and problem list.    Objective     Physical Exam  Vitals reviewed.   Constitutional:       General: She is not in acute distress.     Appearance: Normal appearance. She is well-developed. She is obese. She is not ill-appearing or diaphoretic.   HENT:      Head: Normocephalic and atraumatic.      Right Ear: External ear normal.      Left Ear: External ear normal.      Nose: Nose normal.      Mouth/Throat:      Comments: Wearing a mask  Eyes:      General: No scleral icterus.        Right eye: No discharge.         Left eye: No discharge.      Conjunctiva/sclera: Conjunctivae normal.   Neck:      Vascular: No JVD.   Cardiovascular:      Rate and Rhythm: Normal rate and regular rhythm.      Heart sounds: Normal heart sounds. No murmur heard.    No friction rub. No gallop.   Pulmonary:      Effort: Pulmonary effort is normal. No respiratory distress.      Breath sounds: Normal breath sounds. No wheezing or rales.   Chest:      Chest wall: No tenderness.   Abdominal:      General: Bowel sounds are normal. There is no distension.      Palpations: Abdomen is soft. There is no mass.      Tenderness: There is no abdominal tenderness. There is no guarding.   Musculoskeletal:         General: No deformity. Normal range of motion.      Cervical back: Normal  "range of motion.   Skin:     General: Skin is warm and dry.      Findings: No erythema or rash.   Neurological:      Mental Status: She is alert and oriented to person, place, and time.      Coordination: Coordination normal.   Psychiatric:         Behavior: Behavior normal.         Thought Content: Thought content normal.         Judgment: Judgment normal.      Comments: Mild anxious affect         Vitals:    08/17/22 1109   BP: 124/87   Pulse: 104   Temp: 99.1 °F (37.3 °C)   TempSrc: Infrared   Weight: 92.1 kg (203 lb)   Height: 162.6 cm (64\")     Results Review:   I have reviewed the patient's new clinical and imaging results.    No visits with results within 90 Day(s) from this visit.   Latest known visit with results is:   Lab on 09/24/2021   Component Date Value Ref Range Status   • Glucose 09/24/2021 103 (A) 65 - 99 mg/dL Final   • BUN 09/24/2021 13  6 - 20 mg/dL Final   • Creatinine 09/24/2021 1.07 (A) 0.57 - 1.00 mg/dL Final   • Sodium 09/24/2021 139  136 - 145 mmol/L Final   • Potassium 09/24/2021 4.5  3.5 - 5.2 mmol/L Final   • Chloride 09/24/2021 103  98 - 107 mmol/L Final   • CO2 09/24/2021 26.1  22.0 - 29.0 mmol/L Final   • Calcium 09/24/2021 10.2  8.6 - 10.5 mg/dL Final   • Total Protein 09/24/2021 7.2  6.0 - 8.5 g/dL Final   • Albumin 09/24/2021 4.40  3.50 - 5.20 g/dL Final   • ALT (SGPT) 09/24/2021 27  1 - 33 U/L Final   • AST (SGOT) 09/24/2021 20  1 - 32 U/L Final   • Alkaline Phosphatase 09/24/2021 84  39 - 117 U/L Final   • Total Bilirubin 09/24/2021 0.2  0.0 - 1.2 mg/dL Final   • eGFR Non  Amer 09/24/2021 54 (A) >60 mL/min/1.73 Final   • Globulin 09/24/2021 2.8  gm/dL Final   • A/G Ratio 09/24/2021 1.6  g/dL Final   • BUN/Creatinine Ratio 09/24/2021 12.1  7.0 - 25.0 Final   • Anion Gap 09/24/2021 9.9  5.0 - 15.0 mmol/L Final   • QuantiFERON Criteria 09/24/2021 Comment   Final    The QuantiFERON-TB Gold Plus result is determined by subtracting  the Nil value from either TB antigen (Ag) " tube. The mitogen tube  serves as a control for the test.   • QUANTIFERON TB1 AG VALUE 09/24/2021 0.05  IU/mL Final   • QUANTIFERON TB2 AG VALUE 09/24/2021 0.11  IU/mL Final   • QuantiFERON Nil Value 09/24/2021 0.12  IU/mL Final   • QuantiFERON Mitogen Value 09/24/2021 >10.00  IU/mL Final   • QUANTIFERON-TB GOLD PLUS 09/24/2021 Negative  Negative Final    The specimen received for QuantiFERON testing was incubated by the  ordering institution. Specific procedures outlined in our Directory  of Services and in the package insert for the QuantiFERON Gold  (In Tube) test must be followed to enable for proper stimulation of  cells for the production of interferon gamma.  Chemiluminescence immunoassay methodology   • WBC 09/24/2021 14.23 (A) 3.40 - 10.80 10*3/mm3 Final   • RBC 09/24/2021 4.24  3.77 - 5.28 10*6/mm3 Final   • Hemoglobin 09/24/2021 13.0  12.0 - 15.9 g/dL Final   • Hematocrit 09/24/2021 39.3  34.0 - 46.6 % Final   • MCV 09/24/2021 92.7  79.0 - 97.0 fL Final   • MCH 09/24/2021 30.7  26.6 - 33.0 pg Final   • MCHC 09/24/2021 33.1  31.5 - 35.7 g/dL Final   • RDW 09/24/2021 11.8 (A) 12.3 - 15.4 % Final   • RDW-SD 09/24/2021 40.3  37.0 - 54.0 fl Final   • MPV 09/24/2021 10.4  6.0 - 12.0 fL Final   • Platelets 09/24/2021 368  140 - 450 10*3/mm3 Final   • Neutrophil % 09/24/2021 59.7  42.7 - 76.0 % Final   • Lymphocyte % 09/24/2021 27.8  19.6 - 45.3 % Final   • Monocyte % 09/24/2021 5.4  5.0 - 12.0 % Final   • Eosinophil % 09/24/2021 5.3  0.3 - 6.2 % Final   • Basophil % 09/24/2021 1.2  0.0 - 1.5 % Final   • Immature Grans % 09/24/2021 0.6 (A) 0.0 - 0.5 % Final   • Neutrophils, Absolute 09/24/2021 8.49 (A) 1.70 - 7.00 10*3/mm3 Final   • Lymphocytes, Absolute 09/24/2021 3.96 (A) 0.70 - 3.10 10*3/mm3 Final   • Monocytes, Absolute 09/24/2021 0.77  0.10 - 0.90 10*3/mm3 Final   • Eosinophils, Absolute 09/24/2021 0.75 (A) 0.00 - 0.40 10*3/mm3 Final   • Basophils, Absolute 09/24/2021 0.17  0.00 - 0.20 10*3/mm3 Final   •  Immature Grans, Absolute 09/24/2021 0.09 (A) 0.00 - 0.05 10*3/mm3 Final   • nRBC 09/24/2021 0.0  0.0 - 0.2 /100 WBC Final   • Bilirubin, Direct 09/24/2021 <0.2  0.0 - 0.3 mg/dL Final      No radiology results for the last 90 days.     Assessment / Plan      1. Nausea and vomiting, unspecified vomiting type  2. Gastroesophageal reflux disease, unspecified whether esophagitis present  Having nausea with vomiting 2-3 times per week. This started approx 3 weeks ago. This is occurring mostly in the mornings, not after eating.  Has increased belching. She does wake up at night with sensation that something is caught in her throat. No dysphagia. Had EGD several years ago, was told she had a small hiatal hernia. No recent labs or abdominal imaging to review. Suspect underlying GERD contributing to her vomiting episodes, also gastroparesis and esophageal ulcer to rule out.     Can take zofran PRN nausea  Start Protonix 40 mg once daily  Acid reflux measures discussed  EGD and colonoscopy will be arranged    - ondansetron ODT (ZOFRAN-ODT) 4 MG disintegrating tablet; Place 1 tablet on the tongue Every 8 (Eight) Hours As Needed for Nausea or Vomiting.  Dispense: 42 tablet; Refill: 1  - pantoprazole (PROTONIX) 40 MG EC tablet; Take 1 tablet by mouth Daily. Take in the morning 30 minutes before breakfast  Dispense: 30 tablet; Refill: 3    She will have an esophagogastroduodenoscopy and colonoscopy performed with monitored anesthesia care. The indications, technique, alternatives and potential risk and complications were discussed with the patient including but not limited to bleeding, bowel perforations, missing lesions and anesthetic complications. The patient understands and wishes to proceed with the procedure and has given their verbal consent. Written patient education information was given to the patient. She should follow up in the office after this procedure to discuss the results and further recommendations can be made  at that time. The patient will call if they have further questions before procedure.  - Case Request  - polyethylene glycol (GoLYTELY) 236 g solution; Follow instructions given at office  Dispense: 4000 mL; Refill: 0  - bisacodyl (Dulcolax) 5 MG EC tablet; Follow instructions given at office  Dispense: 4 tablet; Refill: 0    3. Rectal bleeding  4. Chronic idiopathic constipation  She has been having rectal bleeding for the past 3-4 months, it is bright red in color. Blood is in the stool, on the tissue and in the toilet water. Bleeding only occurs with a BM. She has had a hemorrhoid surgery in the past approx 5 years. She has constipation which aggravates the bleeding. BMs are occurring every 2-3 days and hard with straining. Has tried taking dulcolax, miralax without relief. Also has associated bloating.     Increase daily water intake  High fiber diet for now  Anusol BID for next 1 week   Colonoscopy will be arranged  Start Linzess 72 mcg once daily for constipation, samples given    - Hydrocortisone, Perianal, (Anusol-HC) 2.5 % rectal cream; Insert  into the rectum 2 (Two) Times a Day.  Dispense: 28 g; Refill: 1    - linaclotide (Linzess) 72 MCG capsule capsule; Take 1 capsule by mouth Every Morning Before Breakfast. Wait 30 mins before eating.  Dispense: 30 capsule; Refill: 5    5. History of colon polyps  6. Family history of polyps in the colon  Last colonoscopy was 7-8 years ago and reports having over 20 colon polyps removed then. There is no known family history of colon cancer. Mother had colon polyps. She is due for surveillance colonoscopy now, will arrange.         Follow Up:   Return for follow up after procedure to discuss results.      Polly Pyle PA-C  [facility]  8/19/2022  16:31 EDT    Dictated Utilizing Dragon Dictation: Part of this note may be an electronic transcription/translation of spoken language to printed text using the Dragon Dictation System.

## 2022-08-18 PROBLEM — Z83.71 FAMILY HISTORY OF POLYPS IN THE COLON: Status: ACTIVE | Noted: 2022-08-18

## 2022-08-18 PROBLEM — Z86.010 HISTORY OF COLON POLYPS: Status: ACTIVE | Noted: 2022-08-18

## 2022-08-18 PROBLEM — Z86.0100 HISTORY OF COLON POLYPS: Status: ACTIVE | Noted: 2022-08-18

## 2022-08-18 PROBLEM — Z83.719 FAMILY HISTORY OF POLYPS IN THE COLON: Status: ACTIVE | Noted: 2022-08-18

## 2022-08-18 PROBLEM — R11.2 NAUSEA AND VOMITING: Status: ACTIVE | Noted: 2022-08-18

## 2022-08-18 PROBLEM — K21.9 GASTROESOPHAGEAL REFLUX DISEASE: Status: ACTIVE | Noted: 2022-08-18

## 2022-08-25 ENCOUNTER — TELEPHONE (OUTPATIENT)
Dept: GASTROENTEROLOGY | Facility: CLINIC | Age: 52
End: 2022-08-25

## 2022-08-25 DIAGNOSIS — K59.04 CHRONIC IDIOPATHIC CONSTIPATION: ICD-10-CM

## 2022-08-26 ENCOUNTER — PRIOR AUTHORIZATION (OUTPATIENT)
Dept: GASTROENTEROLOGY | Facility: CLINIC | Age: 52
End: 2022-08-26

## 2022-09-06 ENCOUNTER — TRANSCRIBE ORDERS (OUTPATIENT)
Dept: LAB | Facility: HOSPITAL | Age: 52
End: 2022-09-06

## 2022-09-06 ENCOUNTER — HOSPITAL ENCOUNTER (OUTPATIENT)
Dept: GENERAL RADIOLOGY | Facility: HOSPITAL | Age: 52
Discharge: HOME | End: 2022-09-06

## 2022-09-06 ENCOUNTER — LAB (OUTPATIENT)
Dept: LAB | Facility: HOSPITAL | Age: 52
End: 2022-09-06

## 2022-09-06 DIAGNOSIS — L40.0 PSORIASIS VULGARIS: ICD-10-CM

## 2022-09-06 DIAGNOSIS — J43.9 EMPHYSEMATOUS BLEB: ICD-10-CM

## 2022-09-06 DIAGNOSIS — L40.1 IMPETIGO HERPETIFORMIS: ICD-10-CM

## 2022-09-06 DIAGNOSIS — J43.9 EMPHYSEMATOUS BLEB: Primary | ICD-10-CM

## 2022-09-06 LAB
ALBUMIN SERPL-MCNC: 4.2 G/DL (ref 3.5–5.2)
ALBUMIN/GLOB SERPL: 1.6 G/DL
ALP SERPL-CCNC: 74 U/L (ref 39–117)
ALT SERPL W P-5'-P-CCNC: 22 U/L (ref 1–33)
ANION GAP SERPL CALCULATED.3IONS-SCNC: 9.5 MMOL/L (ref 5–15)
AST SERPL-CCNC: 19 U/L (ref 1–32)
BASOPHILS # BLD AUTO: 0.13 10*3/MM3 (ref 0–0.2)
BASOPHILS NFR BLD AUTO: 0.8 % (ref 0–1.5)
BILIRUB CONJ SERPL-MCNC: <0.2 MG/DL (ref 0–0.3)
BILIRUB SERPL-MCNC: 0.3 MG/DL (ref 0–1.2)
BUN SERPL-MCNC: 14 MG/DL (ref 6–20)
BUN/CREAT SERPL: 8.4 (ref 7–25)
CALCIUM SPEC-SCNC: 10 MG/DL (ref 8.6–10.5)
CHLORIDE SERPL-SCNC: 102 MMOL/L (ref 98–107)
CO2 SERPL-SCNC: 25.5 MMOL/L (ref 22–29)
CREAT SERPL-MCNC: 1.67 MG/DL (ref 0.57–1)
DEPRECATED RDW RBC AUTO: 44.6 FL (ref 37–54)
EGFRCR SERPLBLD CKD-EPI 2021: 36.5 ML/MIN/1.73
EOSINOPHIL # BLD AUTO: 0.62 10*3/MM3 (ref 0–0.4)
EOSINOPHIL NFR BLD AUTO: 3.8 % (ref 0.3–6.2)
ERYTHROCYTE [DISTWIDTH] IN BLOOD BY AUTOMATED COUNT: 13.6 % (ref 12.3–15.4)
GLOBULIN UR ELPH-MCNC: 2.6 GM/DL
GLUCOSE SERPL-MCNC: 101 MG/DL (ref 65–99)
HCT VFR BLD AUTO: 36.1 % (ref 34–46.6)
HGB BLD-MCNC: 11.9 G/DL (ref 12–15.9)
LYMPHOCYTES # BLD AUTO: 2.71 10*3/MM3 (ref 0.7–3.1)
LYMPHOCYTES NFR BLD AUTO: 16.8 % (ref 19.6–45.3)
MCH RBC QN AUTO: 29.8 PG (ref 26.6–33)
MCHC RBC AUTO-ENTMCNC: 33 G/DL (ref 31.5–35.7)
MCV RBC AUTO: 90.3 FL (ref 79–97)
MONOCYTES # BLD AUTO: 0.54 10*3/MM3 (ref 0.1–0.9)
MONOCYTES NFR BLD AUTO: 3.3 % (ref 5–12)
NEUTROPHILS NFR BLD AUTO: 12.05 10*3/MM3 (ref 1.7–7)
NEUTROPHILS NFR BLD AUTO: 74.7 % (ref 42.7–76)
PLATELET # BLD AUTO: 456 10*3/MM3 (ref 140–450)
PMV BLD AUTO: 9.8 FL (ref 6–12)
POTASSIUM SERPL-SCNC: 3.4 MMOL/L (ref 3.5–5.2)
PROT SERPL-MCNC: 6.8 G/DL (ref 6–8.5)
RBC # BLD AUTO: 4 10*6/MM3 (ref 3.77–5.28)
SODIUM SERPL-SCNC: 137 MMOL/L (ref 136–145)
WBC NRBC COR # BLD: 16.14 10*3/MM3 (ref 3.4–10.8)

## 2022-09-06 PROCEDURE — 85027 COMPLETE CBC AUTOMATED: CPT

## 2022-09-06 PROCEDURE — 82248 BILIRUBIN DIRECT: CPT

## 2022-09-06 PROCEDURE — 86480 TB TEST CELL IMMUN MEASURE: CPT

## 2022-09-06 PROCEDURE — 71046 X-RAY EXAM CHEST 2 VIEWS: CPT

## 2022-09-06 PROCEDURE — 36415 COLL VENOUS BLD VENIPUNCTURE: CPT

## 2022-09-06 PROCEDURE — 80053 COMPREHEN METABOLIC PANEL: CPT

## 2022-09-08 ENCOUNTER — TRANSCRIBE ORDERS (OUTPATIENT)
Dept: ADMINISTRATIVE | Facility: HOSPITAL | Age: 52
End: 2022-09-08

## 2022-09-08 DIAGNOSIS — Z12.31 SCREENING MAMMOGRAM, ENCOUNTER FOR: Primary | ICD-10-CM

## 2022-09-08 LAB
GAMMA INTERFERON BACKGROUND BLD IA-ACNC: 0.05 IU/ML
M TB IFN-G BLD-IMP: NEGATIVE
M TB IFN-G CD4+ BCKGRND COR BLD-ACNC: 0.05 IU/ML
M TB IFN-G CD4+CD8+ BCKGRND COR BLD-ACNC: 0.03 IU/ML
MITOGEN IGNF BLD-ACNC: >10 IU/ML
QUANTIFERON INCUBATION: NORMAL
SERVICE CMNT-IMP: NORMAL

## 2022-10-24 ENCOUNTER — TELEPHONE (OUTPATIENT)
Dept: GASTROENTEROLOGY | Facility: CLINIC | Age: 52
End: 2022-10-24

## 2022-10-24 RX ORDER — PEG-3350, SODIUM SULFATE, SODIUM CHLORIDE, POTASSIUM CHLORIDE, SODIUM ASCORBATE AND ASCORBIC ACID 7.5-2.691G
1000 KIT ORAL TAKE AS DIRECTED
Qty: 1 EACH | Refills: 0 | Status: SHIPPED | OUTPATIENT
Start: 2022-10-24 | End: 2022-10-26 | Stop reason: HOSPADM

## 2022-10-24 NOTE — TELEPHONE ENCOUNTER
----- Message from Latonia Honeycutt MA sent at 10/24/2022 11:48 AM EDT -----  She wants a different prep other than the Golytely.

## 2022-10-25 RX ORDER — CERTOLIZUMAB PEGOL 400 MG
KIT SUBCUTANEOUS
COMMUNITY
End: 2023-01-04

## 2022-10-26 ENCOUNTER — ANESTHESIA EVENT (OUTPATIENT)
Dept: GASTROENTEROLOGY | Facility: HOSPITAL | Age: 52
End: 2022-10-26

## 2022-10-26 ENCOUNTER — ANESTHESIA (OUTPATIENT)
Dept: GASTROENTEROLOGY | Facility: HOSPITAL | Age: 52
End: 2022-10-26

## 2022-10-26 ENCOUNTER — HOSPITAL ENCOUNTER (OUTPATIENT)
Facility: HOSPITAL | Age: 52
Setting detail: HOSPITAL OUTPATIENT SURGERY
Discharge: HOME | End: 2022-10-26
Attending: INTERNAL MEDICINE | Admitting: INTERNAL MEDICINE

## 2022-10-26 VITALS
OXYGEN SATURATION: 95 % | DIASTOLIC BLOOD PRESSURE: 81 MMHG | BODY MASS INDEX: 32.78 KG/M2 | SYSTOLIC BLOOD PRESSURE: 110 MMHG | WEIGHT: 192 LBS | HEART RATE: 89 BPM | RESPIRATION RATE: 16 BRPM | TEMPERATURE: 97.3 F | HEIGHT: 64 IN

## 2022-10-26 DIAGNOSIS — K21.9 GASTROESOPHAGEAL REFLUX DISEASE, UNSPECIFIED WHETHER ESOPHAGITIS PRESENT: ICD-10-CM

## 2022-10-26 DIAGNOSIS — R11.2 NAUSEA AND VOMITING, UNSPECIFIED VOMITING TYPE: ICD-10-CM

## 2022-10-26 DIAGNOSIS — Z83.71 FAMILY HISTORY OF POLYPS IN THE COLON: ICD-10-CM

## 2022-10-26 DIAGNOSIS — Z86.010 HISTORY OF COLON POLYPS: ICD-10-CM

## 2022-10-26 DIAGNOSIS — K62.5 RECTAL BLEEDING: ICD-10-CM

## 2022-10-26 LAB
B-HCG UR QL: NEGATIVE
EXPIRATION DATE: NORMAL
INTERNAL NEGATIVE CONTROL: NEGATIVE
INTERNAL POSITIVE CONTROL: POSITIVE
Lab: NORMAL

## 2022-10-26 PROCEDURE — 45385 COLONOSCOPY W/LESION REMOVAL: CPT | Performed by: INTERNAL MEDICINE

## 2022-10-26 PROCEDURE — 25010000002 PROPOFOL 10 MG/ML EMULSION: Performed by: NURSE ANESTHETIST, CERTIFIED REGISTERED

## 2022-10-26 PROCEDURE — 81025 URINE PREGNANCY TEST: CPT | Performed by: INTERNAL MEDICINE

## 2022-10-26 PROCEDURE — 43239 EGD BIOPSY SINGLE/MULTIPLE: CPT | Performed by: INTERNAL MEDICINE

## 2022-10-26 PROCEDURE — 45380 COLONOSCOPY AND BIOPSY: CPT | Performed by: INTERNAL MEDICINE

## 2022-10-26 RX ORDER — KETAMINE HYDROCHLORIDE 50 MG/ML
INJECTION, SOLUTION, CONCENTRATE INTRAMUSCULAR; INTRAVENOUS AS NEEDED
Status: DISCONTINUED | OUTPATIENT
Start: 2022-10-26 | End: 2022-10-26 | Stop reason: SURG

## 2022-10-26 RX ORDER — PROPOFOL 10 MG/ML
VIAL (ML) INTRAVENOUS AS NEEDED
Status: DISCONTINUED | OUTPATIENT
Start: 2022-10-26 | End: 2022-10-26 | Stop reason: SURG

## 2022-10-26 RX ORDER — SODIUM CHLORIDE 9 MG/ML
30 INJECTION, SOLUTION INTRAVENOUS CONTINUOUS PRN
Status: DISCONTINUED | OUTPATIENT
Start: 2022-10-26 | End: 2022-10-26 | Stop reason: HOSPADM

## 2022-10-26 RX ORDER — LIDOCAINE HYDROCHLORIDE 20 MG/ML
INJECTION, SOLUTION INTRAVENOUS AS NEEDED
Status: DISCONTINUED | OUTPATIENT
Start: 2022-10-26 | End: 2022-10-26 | Stop reason: SURG

## 2022-10-26 RX ORDER — SIMETHICONE 20 MG/.3ML
EMULSION ORAL AS NEEDED
Status: DISCONTINUED | OUTPATIENT
Start: 2022-10-26 | End: 2022-10-26 | Stop reason: HOSPADM

## 2022-10-26 RX ADMIN — KETAMINE HYDROCHLORIDE 20 MG: 50 INJECTION, SOLUTION INTRAMUSCULAR; INTRAVENOUS at 08:13

## 2022-10-26 RX ADMIN — LIDOCAINE HYDROCHLORIDE 40 MG: 20 INJECTION, SOLUTION INTRAVENOUS at 08:13

## 2022-10-26 RX ADMIN — SODIUM CHLORIDE 30 ML/HR: 9 INJECTION, SOLUTION INTRAVENOUS at 08:06

## 2022-10-26 RX ADMIN — PROPOFOL 50 MG: 10 INJECTION, EMULSION INTRAVENOUS at 08:19

## 2022-10-26 RX ADMIN — PROPOFOL 140 MCG/KG/MIN: 10 INJECTION, EMULSION INTRAVENOUS at 08:22

## 2022-10-26 RX ADMIN — PROPOFOL 100 MG: 10 INJECTION, EMULSION INTRAVENOUS at 08:13

## 2022-10-26 RX ADMIN — PROPOFOL 50 MG: 10 INJECTION, EMULSION INTRAVENOUS at 08:50

## 2022-10-26 RX ADMIN — PROPOFOL 50 MG: 10 INJECTION, EMULSION INTRAVENOUS at 08:46

## 2022-10-26 NOTE — ANESTHESIA POSTPROCEDURE EVALUATION
Patient: Ashleigh Hill    Procedure Summary     Date: 10/26/22 Room / Location: [facility] 2 / [facility]    Anesthesia Start: 0807 Anesthesia Stop: 0853    Procedures:       ESOPHAGOGASTRODUODENOSCOPY WITH BIOPSY CPT CODE: 60025      COLONOSCOPY CPT CODE: 39081 (Anus) Diagnosis:       Nausea and vomiting, unspecified vomiting type      Gastroesophageal reflux disease, unspecified whether esophagitis present      Rectal bleeding      History of colon polyps      Family history of polyps in the colon      (Nausea and vomiting, unspecified vomiting type [R11.2])      (Gastroesophageal reflux disease, unspecified whether esophagitis present [K21.9])      (Rectal bleeding [K62.5])      (History of colon polyps [Z86.010])      (Family history of polyps in the colon [Z83.71])    Surgeons: Lane Velazco MD Provider: Luiz Bullock CRNA    Anesthesia Type: MAC ASA Status: 3          Anesthesia Type: MAC    Vitals  HR 90  Sat 98  Resp 12  BP 97/43  Temp 98        Post Anesthesia Care and Evaluation    Patient location during evaluation: bedside  Patient participation: complete - patient participated  Level of consciousness: awake and alert  Pain score: 0  Pain management: adequate    Airway patency: patent  Anesthetic complications: No anesthetic complications  PONV Status: none  Cardiovascular status: acceptable  Respiratory status: acceptable  Hydration status: acceptable

## 2022-10-26 NOTE — ANESTHESIA PREPROCEDURE EVALUATION
Anesthesia Evaluation     Patient summary reviewed and Nursing notes reviewed   NPO Solid Status: > 8 hours  NPO Liquid Status: > 8 hours           Airway   Mallampati: II  TM distance: >3 FB  Neck ROM: full  Possible difficult intubation  Dental - normal exam     Pulmonary - normal exam   (+) a smoker Former, recent URI, sleep apnea,   Cardiovascular - normal exam    (+) hyperlipidemia,       Neuro/Psych  (+) headaches, psychiatric history Anxiety, Depression and Bipolar,    GI/Hepatic/Renal/Endo    (+) obesity,  hiatal hernia, GERD, GI bleeding , renal disease stones, thyroid problem thyroid nodules    Musculoskeletal     Abdominal   (+) obese,    Substance History   (+) alcohol use,   (-) drug use     OB/GYN          Other                        Anesthesia Plan    ASA 3     MAC     (Risks and benefits discussed including risk of aspiration, recall and dental damage. All patient questions answered.    Will continue with plan of care.)  intravenous induction     Anesthetic plan, risks, benefits, and alternatives have been provided, discussed and informed consent has been obtained with: patient.  Pre-procedure education provided  Plan discussed with CRNA.        CODE STATUS:

## 2022-10-28 ENCOUNTER — HOSPITAL ENCOUNTER (OUTPATIENT)
Dept: MAMMOGRAPHY | Facility: HOSPITAL | Age: 52
Discharge: HOME | End: 2022-10-28
Admitting: INTERNAL MEDICINE

## 2022-10-28 DIAGNOSIS — Z12.31 SCREENING MAMMOGRAM, ENCOUNTER FOR: ICD-10-CM

## 2022-10-28 LAB — REF LAB TEST METHOD: NORMAL

## 2022-10-28 PROCEDURE — 77067 SCR MAMMO BI INCL CAD: CPT

## 2022-10-28 PROCEDURE — 77067 SCR MAMMO BI INCL CAD: CPT | Performed by: RADIOLOGY

## 2022-10-28 PROCEDURE — 77063 BREAST TOMOSYNTHESIS BI: CPT

## 2022-10-28 PROCEDURE — 77063 BREAST TOMOSYNTHESIS BI: CPT | Performed by: RADIOLOGY

## 2022-10-30 DIAGNOSIS — K62.5 RECTAL BLEEDING: ICD-10-CM

## 2022-10-31 RX ORDER — POLYETHYLENE GLYCOL-3350 AND ELECTROLYTES 236; 6.74; 5.86; 2.97; 22.74 G/274.31G; G/274.31G; G/274.31G; G/274.31G; G/274.31G
POWDER, FOR SOLUTION ORAL
Refills: 0 | OUTPATIENT
Start: 2022-10-31

## 2022-12-02 DIAGNOSIS — K21.9 GASTROESOPHAGEAL REFLUX DISEASE, UNSPECIFIED WHETHER ESOPHAGITIS PRESENT: ICD-10-CM

## 2022-12-05 RX ORDER — PANTOPRAZOLE SODIUM 40 MG/1
TABLET, DELAYED RELEASE ORAL
Qty: 30 TABLET | Refills: 3 | Status: SHIPPED | OUTPATIENT
Start: 2022-12-05 | End: 2023-04-04

## 2022-12-14 ENCOUNTER — APPOINTMENT (OUTPATIENT)
Dept: GENERAL RADIOLOGY | Facility: HOSPITAL | Age: 52
End: 2022-12-14

## 2022-12-14 ENCOUNTER — HOSPITAL ENCOUNTER (EMERGENCY)
Facility: HOSPITAL | Age: 52
Discharge: HOME | End: 2022-12-14
Attending: EMERGENCY MEDICINE | Admitting: EMERGENCY MEDICINE

## 2022-12-14 VITALS
BODY MASS INDEX: 33.29 KG/M2 | HEIGHT: 64 IN | HEART RATE: 118 BPM | DIASTOLIC BLOOD PRESSURE: 102 MMHG | SYSTOLIC BLOOD PRESSURE: 137 MMHG | RESPIRATION RATE: 20 BRPM | WEIGHT: 195 LBS | TEMPERATURE: 98.6 F | OXYGEN SATURATION: 100 %

## 2022-12-14 DIAGNOSIS — S90.451A FOREIGN BODY OF SKIN OF RIGHT GREAT TOE: Primary | ICD-10-CM

## 2022-12-14 PROCEDURE — 99283 EMERGENCY DEPT VISIT LOW MDM: CPT

## 2022-12-14 PROCEDURE — 73630 X-RAY EXAM OF FOOT: CPT

## 2022-12-14 RX ORDER — CLINDAMYCIN HYDROCHLORIDE 150 MG/1
450 CAPSULE ORAL ONCE
Status: COMPLETED | OUTPATIENT
Start: 2022-12-14 | End: 2022-12-14

## 2022-12-14 RX ORDER — LIDOCAINE HYDROCHLORIDE 10 MG/ML
10 INJECTION, SOLUTION INFILTRATION; PERINEURAL ONCE
Status: DISCONTINUED | OUTPATIENT
Start: 2022-12-14 | End: 2022-12-14

## 2022-12-14 RX ORDER — CLINDAMYCIN HYDROCHLORIDE 150 MG/1
450 CAPSULE ORAL 3 TIMES DAILY
Qty: 63 CAPSULE | Refills: 0 | Status: SHIPPED | OUTPATIENT
Start: 2022-12-14 | End: 2022-12-21

## 2022-12-14 RX ADMIN — CLINDAMYCIN HYDROCHLORIDE 450 MG: 150 CAPSULE ORAL at 13:02

## 2022-12-14 NOTE — DISCHARGE INSTRUCTIONS
Take antibiotic as prescribed.  Follow-up with the orthopedic specialist or podiatrist for further outpatient evaluation and definitive care of foreign body in right great toe.  Follow-up with your PCP as needed for further outpatient evaluation.  Return to the ER for new or worsening symptoms or acute concerns.

## 2022-12-14 NOTE — ED PROVIDER NOTES
Subjective   History of Present Illness  Patient is a 52-year-old female presenting to the ER with complaints of foreign body in her right great toe.  Patient has been seen by her PCP who had an x-ray performed and did reveal an 8 mm foreign body within the medial soft tissues of the distal aspect of the right great toe.  Patient states that there is a black dot that is gotten bigger on her great toe where the needle is stuck.  She states that it has gotten more painful and she was worried about infection so she came to the ER for further evaluation.  She does already see a podiatrist, Dr. Rizvi, but she was unsure if he handled this kind of thing.  She denies fever and additional symptoms or complaints at this time.    Review of Systems   Skin:        Foreign body to left great toe   All other systems reviewed and are negative.      Past Medical History:   Diagnosis Date   • Abdominal pain    • Abnormal weight gain    • Anxiety    • Bipolar affective disorder (HCC)    • Cholelithiasis     removed   • Colon polyp     had over 20 polyps   • Depression    • Elevated cholesterol    • Elevated white blood cell count    • Excessive sweating    • Hematuria    • Hemorrhoids    • Hiatal hernia    • History of psoriasis    • History of thyroid nodule    • Hyperlipidemia    • Kidney stone    • Leukocytosis    • Lumbago    • Migraines    • Nephrolithiasis     left   • Ovarian cyst     left   • Porphyria (HCC)    • Prediabetes    • Sleep apnea    • Snores    • Tobacco use    • Toe infection    • Upper respiratory infection    • Vaginitis    • Visit for screening mammogram        Allergies   Allergen Reactions   • Bee Venom Anaphylaxis   • Codeine Hives       Past Surgical History:   Procedure Laterality Date   • ABDOMINOPLASTY     • BREAST RECONSTRUCTION Bilateral 2010    Breast Lift   •  SECTION      x2   • CHOLECYSTECTOMY     • COLONOSCOPY     • COLONOSCOPY N/A 10/26/2022    Procedure:  COLONOSCOPY CPT CODE: 23891;  Surgeon: Lane Velazco MD;  Location: [facility] ENDOSCOPY;  Service: Gastroenterology;  Laterality: N/A;   • ENDOMETRIAL ABLATION     • ENDOSCOPY N/A 10/26/2022    Procedure: ESOPHAGOGASTRODUODENOSCOPY WITH BIOPSY CPT CODE: 02736;  Surgeon: Lane Velazco MD;  Location: [facility] ENDOSCOPY;  Service: Gastroenterology;  Laterality: N/A;   • HEMORRHOIDECTOMY  2018    had 3 removed   • KIDNEY STONE SURGERY     • TONSILLECTOMY     • TUBAL ABDOMINAL LIGATION         Family History   Problem Relation Age of Onset   • Arthritis Mother    • Diabetes Mother    • Hypertension Mother    • Osteoporosis Mother    • Thyroid disease Mother    • Hyperlipidemia Mother    • Colon polyps Mother    • Cancer Father    • Kidney disease Father    • Breast cancer Maternal Aunt    • Colon cancer Neg Hx    • Ovarian cancer Neg Hx        Social History     Socioeconomic History   • Marital status:    Tobacco Use   • Smoking status: Former     Packs/day: 1.00     Years: 30.00     Pack years: 30.00     Types: Cigarettes     Start date: 1985     Quit date: 2020     Years since quittin.1   • Smokeless tobacco: Never   Vaping Use   • Vaping Use: Never used   Substance and Sexual Activity   • Alcohol use: Yes     Comment: rarely 2 times per year   • Drug use: Never   • Sexual activity: Defer           Objective   Physical Exam  Vitals and nursing note reviewed.   Constitutional:       General: She is not in acute distress.     Appearance: She is not toxic-appearing.   HENT:      Head: Normocephalic and atraumatic.      Right Ear: External ear normal.      Left Ear: External ear normal.      Nose: Nose normal.   Eyes:      Extraocular Movements: Extraocular movements intact.      Conjunctiva/sclera: Conjunctivae normal.   Cardiovascular:      Rate and Rhythm: Tachycardia present.   Pulmonary:      Effort: Pulmonary effort is normal. No respiratory distress.   Abdominal:       "General: There is no distension.      Palpations: Abdomen is soft.   Musculoskeletal:         General: Normal range of motion.      Cervical back: Normal range of motion and neck supple.   Skin:     General: Skin is warm and dry.      Comments: Small black dot to right great toe, RLE is neurovascularly intact   Neurological:      General: No focal deficit present.      Mental Status: She is alert and oriented to person, place, and time.   Psychiatric:         Mood and Affect: Mood normal.         Behavior: Behavior normal.         Procedures           ED Course      XR Foot 3+ View Right    Result Date: 12/14/2022  PROCEDURE: XR FOOT 3+ VW RIGHT-  THREE VIEW  HISTORY: pain, reported needle in foot  FINDINGS:  Three views show no evidence of an acute, displaced fracture or dislocation of the visualized bony architecture.  The joint spaces appear normal.  A needle is noted in the medial plantar soft tissues of the first digit at the level of the distal phalanx. The needle is approximately 9 mm in length, approximately 2-3 mm deep to the skin surface.      Impression: Foreign body first digit as above    This report was signed and finalized on 12/14/2022 12:31 PM by Vipul Rose MD.         BP (!) 137/102 (BP Location: Left arm, Patient Position: Sitting)   Pulse 118   Temp 98.6 °F (37 °C)   Resp 20   Ht 162.6 cm (64\")   Wt 88.5 kg (195 lb)   LMP  (LMP Unknown)   SpO2 100%   BMI 33.47 kg/m²                                   MDM   Patient was evaluated in the ER for foreign body to right great toe after stepping on a sewing needle.  Patient is hemodynamically stable, afebrile, nontoxic-appearing on exam.  X-ray was performed and the needle did appear to be close to the surface.  I did offer to perform digital block and make a small incision and try to remove the needle but patient declined this and would prefer to try antibiotics and follow-up with an orthopedic specialist or podiatrist.  She was started on " clindamycin with first dose given in the ER.  She states that tetanus is up-to-date.  Information provided for Dr. Watson and Dr. Quintero for definitive care of foreign body in right great toe.  Patient agreeable with plan for discharge.  Precautions were given for return to the ER for any new or worsening symptoms.    Final diagnoses:   Foreign body of skin of right great toe       ED Disposition  ED Disposition     ED Disposition   Discharge    Condition   Stable    Comment   --             Livan Boston MD  [address] [id number] [phone]    Schedule an appointment as soon as possible for a visit   for further outpatient evaluation if symptoms persist    Brian Watson MD  [address] [facility] [address]  [place] [address]  [phone]    Schedule an appointment as soon as possible for a visit   for further outpatient evaluation/definitive care of foreign body in right great toe    Pedro Quintero, DPM  [address] [id number] [phone]    Schedule an appointment as soon as possible for a visit   for further outpatient evaluation/definitive care of foreign body in right great toe    [facility] Emergency Department  [address] [facility] [address] [phone]  Go to   As needed, If symptoms worsen         Medication List      New Prescriptions    clindamycin 150 MG capsule  Commonly known as: CLEOCIN  Take 3 capsules by mouth 3 (Three) Times a Day for 7 days.        Changed    FLUoxetine 20 MG capsule  Commonly known as: PROzac  Take 3 capsules daily  What changed:   · how much to take  · how to take this  · when to take this           Where to Get Your Medications      These medications were sent to [facility] [id number] - [address] - [phone]  - [phone]   [address] [place] [address]    Phone: [phone]   · clindamycin 150 MG capsule          Deedee Montes PA-C  12/14/22  8729

## 2022-12-21 ENCOUNTER — TRANSCRIBE ORDERS (OUTPATIENT)
Dept: CT IMAGING | Facility: HOSPITAL | Age: 52
End: 2022-12-21

## 2022-12-21 ENCOUNTER — HOSPITAL ENCOUNTER (OUTPATIENT)
Dept: CT IMAGING | Facility: HOSPITAL | Age: 52
Discharge: HOME | End: 2022-12-21
Admitting: NURSE PRACTITIONER

## 2022-12-21 DIAGNOSIS — R31.0 GROSS HEMATURIA: ICD-10-CM

## 2022-12-21 DIAGNOSIS — R31.0 GROSS HEMATURIA: Primary | ICD-10-CM

## 2022-12-21 PROCEDURE — 74176 CT ABD & PELVIS W/O CONTRAST: CPT

## 2022-12-24 ENCOUNTER — HOSPITAL ENCOUNTER (EMERGENCY)
Facility: HOSPITAL | Age: 52
Discharge: HOME | End: 2022-12-24
Attending: EMERGENCY MEDICINE | Admitting: EMERGENCY MEDICINE

## 2022-12-24 VITALS
DIASTOLIC BLOOD PRESSURE: 100 MMHG | RESPIRATION RATE: 18 BRPM | HEIGHT: 64 IN | HEART RATE: 112 BPM | WEIGHT: 195 LBS | SYSTOLIC BLOOD PRESSURE: 165 MMHG | OXYGEN SATURATION: 98 % | BODY MASS INDEX: 33.29 KG/M2 | TEMPERATURE: 98.5 F

## 2022-12-24 DIAGNOSIS — N13.2 URETERAL STONE WITH HYDRONEPHROSIS: ICD-10-CM

## 2022-12-24 DIAGNOSIS — N23 RENAL COLIC: Primary | ICD-10-CM

## 2022-12-24 LAB
ALBUMIN SERPL-MCNC: 4.5 G/DL (ref 3.5–5.2)
ALBUMIN/GLOB SERPL: 1.5 G/DL
ALP SERPL-CCNC: 101 U/L (ref 39–117)
ALT SERPL W P-5'-P-CCNC: 18 U/L (ref 1–33)
ANION GAP SERPL CALCULATED.3IONS-SCNC: 11 MMOL/L (ref 5–15)
AST SERPL-CCNC: 20 U/L (ref 1–32)
BACTERIA UR QL AUTO: ABNORMAL /HPF
BASOPHILS # BLD AUTO: 0.12 10*3/MM3 (ref 0–0.2)
BASOPHILS NFR BLD AUTO: 0.6 % (ref 0–1.5)
BILIRUB SERPL-MCNC: <0.2 MG/DL (ref 0–1.2)
BILIRUB UR QL STRIP: NEGATIVE
BUN SERPL-MCNC: 19 MG/DL (ref 6–20)
BUN/CREAT SERPL: 11.9 (ref 7–25)
CALCIUM SPEC-SCNC: 10.3 MG/DL (ref 8.6–10.5)
CHLORIDE SERPL-SCNC: 105 MMOL/L (ref 98–107)
CLARITY UR: ABNORMAL
CO2 SERPL-SCNC: 24 MMOL/L (ref 22–29)
COLOR UR: ABNORMAL
CREAT SERPL-MCNC: 1.6 MG/DL (ref 0.57–1)
DEPRECATED RDW RBC AUTO: 44 FL (ref 37–54)
EGFRCR SERPLBLD CKD-EPI 2021: 38.4 ML/MIN/1.73
EOSINOPHIL # BLD AUTO: 0.33 10*3/MM3 (ref 0–0.4)
EOSINOPHIL NFR BLD AUTO: 1.6 % (ref 0.3–6.2)
ERYTHROCYTE [DISTWIDTH] IN BLOOD BY AUTOMATED COUNT: 13.8 % (ref 12.3–15.4)
GLOBULIN UR ELPH-MCNC: 3.1 GM/DL
GLUCOSE SERPL-MCNC: 105 MG/DL (ref 65–99)
GLUCOSE UR STRIP-MCNC: NEGATIVE MG/DL
HCT VFR BLD AUTO: 41 % (ref 34–46.6)
HGB BLD-MCNC: 13.2 G/DL (ref 12–15.9)
HGB UR QL STRIP.AUTO: ABNORMAL
HOLD SPECIMEN: NORMAL
HOLD SPECIMEN: NORMAL
HYALINE CASTS UR QL AUTO: ABNORMAL /LPF
IMM GRANULOCYTES # BLD AUTO: 0.15 10*3/MM3 (ref 0–0.05)
IMM GRANULOCYTES NFR BLD AUTO: 0.7 % (ref 0–0.5)
KETONES UR QL STRIP: NEGATIVE
LEUKOCYTE ESTERASE UR QL STRIP.AUTO: ABNORMAL
LYMPHOCYTES # BLD AUTO: 2.9 10*3/MM3 (ref 0.7–3.1)
LYMPHOCYTES NFR BLD AUTO: 14 % (ref 19.6–45.3)
MCH RBC QN AUTO: 28.3 PG (ref 26.6–33)
MCHC RBC AUTO-ENTMCNC: 32.2 G/DL (ref 31.5–35.7)
MCV RBC AUTO: 88 FL (ref 79–97)
MONOCYTES # BLD AUTO: 1.11 10*3/MM3 (ref 0.1–0.9)
MONOCYTES NFR BLD AUTO: 5.3 % (ref 5–12)
NEUTROPHILS NFR BLD AUTO: 16.15 10*3/MM3 (ref 1.7–7)
NEUTROPHILS NFR BLD AUTO: 77.8 % (ref 42.7–76)
NITRITE UR QL STRIP: NEGATIVE
NRBC BLD AUTO-RTO: 0 /100 WBC (ref 0–0.2)
PH UR STRIP.AUTO: 5.5 [PH] (ref 5–8)
PLATELET # BLD AUTO: 432 10*3/MM3 (ref 140–450)
PMV BLD AUTO: 8.9 FL (ref 6–12)
POTASSIUM SERPL-SCNC: 4.3 MMOL/L (ref 3.5–5.2)
PROT SERPL-MCNC: 7.6 G/DL (ref 6–8.5)
PROT UR QL STRIP: ABNORMAL
RBC # BLD AUTO: 4.66 10*6/MM3 (ref 3.77–5.28)
RBC # UR STRIP: ABNORMAL /HPF
REF LAB TEST METHOD: ABNORMAL
SODIUM SERPL-SCNC: 140 MMOL/L (ref 136–145)
SP GR UR STRIP: 1.02 (ref 1–1.03)
SQUAMOUS #/AREA URNS HPF: ABNORMAL /HPF
UROBILINOGEN UR QL STRIP: ABNORMAL
WBC # UR STRIP: ABNORMAL /HPF
WBC NRBC COR # BLD: 20.76 10*3/MM3 (ref 3.4–10.8)
WHOLE BLOOD HOLD COAG: NORMAL
WHOLE BLOOD HOLD SPECIMEN: NORMAL

## 2022-12-24 PROCEDURE — 96374 THER/PROPH/DIAG INJ IV PUSH: CPT

## 2022-12-24 PROCEDURE — 63710000001 ONDANSETRON ODT 4 MG TABLET DISPERSIBLE: Performed by: EMERGENCY MEDICINE

## 2022-12-24 PROCEDURE — 99284 EMERGENCY DEPT VISIT MOD MDM: CPT

## 2022-12-24 PROCEDURE — 85025 COMPLETE CBC W/AUTO DIFF WBC: CPT

## 2022-12-24 PROCEDURE — 80053 COMPREHEN METABOLIC PANEL: CPT

## 2022-12-24 PROCEDURE — 96361 HYDRATE IV INFUSION ADD-ON: CPT

## 2022-12-24 PROCEDURE — 25010000002 KETOROLAC TROMETHAMINE PER 15 MG: Performed by: EMERGENCY MEDICINE

## 2022-12-24 PROCEDURE — 25010000002 ONDANSETRON PER 1 MG: Performed by: EMERGENCY MEDICINE

## 2022-12-24 PROCEDURE — 96375 TX/PRO/DX INJ NEW DRUG ADDON: CPT

## 2022-12-24 PROCEDURE — 81001 URINALYSIS AUTO W/SCOPE: CPT

## 2022-12-24 RX ORDER — TAMSULOSIN HYDROCHLORIDE 0.4 MG/1
1 CAPSULE ORAL NIGHTLY
Qty: 10 CAPSULE | Refills: 0 | Status: SHIPPED | OUTPATIENT
Start: 2022-12-24 | End: 2023-01-05 | Stop reason: HOSPADM

## 2022-12-24 RX ORDER — ONDANSETRON 4 MG/1
4 TABLET, ORALLY DISINTEGRATING ORAL EVERY 8 HOURS PRN
Qty: 12 TABLET | Refills: 0 | Status: SHIPPED | OUTPATIENT
Start: 2022-12-24

## 2022-12-24 RX ORDER — SODIUM CHLORIDE 0.9 % (FLUSH) 0.9 %
10 SYRINGE (ML) INJECTION AS NEEDED
Status: DISCONTINUED | OUTPATIENT
Start: 2022-12-24 | End: 2022-12-25 | Stop reason: HOSPADM

## 2022-12-24 RX ORDER — ONDANSETRON 2 MG/ML
4 INJECTION INTRAMUSCULAR; INTRAVENOUS ONCE
Status: COMPLETED | OUTPATIENT
Start: 2022-12-24 | End: 2022-12-24

## 2022-12-24 RX ORDER — TAMSULOSIN HYDROCHLORIDE 0.4 MG/1
0.4 CAPSULE ORAL ONCE
Status: COMPLETED | OUTPATIENT
Start: 2022-12-24 | End: 2022-12-24

## 2022-12-24 RX ORDER — OXYCODONE HYDROCHLORIDE AND ACETAMINOPHEN 5; 325 MG/1; MG/1
2 TABLET ORAL ONCE
Status: COMPLETED | OUTPATIENT
Start: 2022-12-24 | End: 2022-12-24

## 2022-12-24 RX ORDER — ONDANSETRON 4 MG/1
4 TABLET, ORALLY DISINTEGRATING ORAL ONCE
Status: COMPLETED | OUTPATIENT
Start: 2022-12-24 | End: 2022-12-24

## 2022-12-24 RX ORDER — KETOROLAC TROMETHAMINE 30 MG/ML
30 INJECTION, SOLUTION INTRAMUSCULAR; INTRAVENOUS ONCE
Status: COMPLETED | OUTPATIENT
Start: 2022-12-24 | End: 2022-12-24

## 2022-12-24 RX ORDER — KETOROLAC TROMETHAMINE 10 MG/1
10 TABLET, FILM COATED ORAL EVERY 6 HOURS PRN
Qty: 20 TABLET | Refills: 0 | Status: SHIPPED | OUTPATIENT
Start: 2022-12-24 | End: 2023-01-05 | Stop reason: HOSPADM

## 2022-12-24 RX ORDER — OXYCODONE HYDROCHLORIDE AND ACETAMINOPHEN 5; 325 MG/1; MG/1
1 TABLET ORAL EVERY 6 HOURS PRN
Qty: 12 TABLET | Refills: 0 | Status: SHIPPED | OUTPATIENT
Start: 2022-12-24 | End: 2023-01-05 | Stop reason: HOSPADM

## 2022-12-24 RX ADMIN — OXYCODONE HYDROCHLORIDE AND ACETAMINOPHEN 2 TABLET: 5; 325 TABLET ORAL at 23:15

## 2022-12-24 RX ADMIN — LIDOCAINE HYDROCHLORIDE 125 MG: 10 INJECTION, SOLUTION EPIDURAL; INFILTRATION; INTRACAUDAL; PERINEURAL at 20:03

## 2022-12-24 RX ADMIN — ONDANSETRON 4 MG: 2 INJECTION INTRAMUSCULAR; INTRAVENOUS at 19:53

## 2022-12-24 RX ADMIN — TAMSULOSIN HYDROCHLORIDE 0.4 MG: 0.4 CAPSULE ORAL at 23:15

## 2022-12-24 RX ADMIN — KETOROLAC TROMETHAMINE 30 MG: 30 INJECTION, SOLUTION INTRAMUSCULAR; INTRAVENOUS at 19:53

## 2022-12-24 RX ADMIN — ONDANSETRON 4 MG: 4 TABLET, ORALLY DISINTEGRATING ORAL at 23:15

## 2022-12-24 RX ADMIN — SODIUM CHLORIDE 1000 ML: 9 INJECTION, SOLUTION INTRAVENOUS at 19:53

## 2022-12-25 NOTE — ED PROVIDER NOTES
Subjective  History of Present Illness:    Chief Complaint: Flank pain  History of Present Illness: 52-year-old female presents with right flank pain, ongoing issue, diagnosed with 4 x 6 mm right-sided ureteral stone from CT dated 2022.  Patient has been taking over-the-counter medications with no relief.  Does have a history of kidney stones.  No fever no chills no hematuria  Onset: Past few days  Duration: Persistent  Exacerbating / Alleviating factors: See above interventions with persistent symptoms  Associated symptoms: None      Nurses Notes reviewed and agree, including vitals, allergies, social history and prior medical history.     REVIEW OF SYSTEMS: All systems reviewed and not pertinent unless noted.    Positive for: Flank pain on the right, nausea    Negative for: Fever chills hematuria    Past Medical History:   Diagnosis Date   • Abdominal pain    • Abnormal weight gain    • Anxiety    • Bipolar affective disorder (HCC)    • Cholelithiasis     removed   • Colon polyp     had over 20 polyps   • Depression    • Elevated cholesterol    • Elevated white blood cell count    • Excessive sweating    • Hematuria    • Hemorrhoids    • Hiatal hernia    • History of psoriasis    • History of thyroid nodule    • Hyperlipidemia    • Kidney stone    • Leukocytosis    • Lumbago    • Migraines    • Nephrolithiasis     left   • Ovarian cyst     left   • Porphyria (HCC)    • Prediabetes    • Sleep apnea    • Snores    • Tobacco use    • Toe infection    • Upper respiratory infection    • Vaginitis    • Visit for screening mammogram        Allergies:    Bee venom and Codeine      Past Surgical History:   Procedure Laterality Date   • ABDOMINOPLASTY     • BREAST RECONSTRUCTION Bilateral 2010    Breast Lift   •  SECTION      x2   • CHOLECYSTECTOMY     • COLONOSCOPY     • COLONOSCOPY N/A 10/26/2022    Procedure: COLONOSCOPY CPT CODE: 44921;  Surgeon: Lane Velazco MD;   "Location: [facility] ENDOSCOPY;  Service: Gastroenterology;  Laterality: N/A;   • ENDOMETRIAL ABLATION     • ENDOSCOPY N/A 10/26/2022    Procedure: ESOPHAGOGASTRODUODENOSCOPY WITH BIOPSY CPT CODE: 54146;  Surgeon: Lane Velazco MD;  Location: [facility] ENDOSCOPY;  Service: Gastroenterology;  Laterality: N/A;   • HEMORRHOIDECTOMY  2018    had 3 removed   • KIDNEY STONE SURGERY     • TONSILLECTOMY     • TUBAL ABDOMINAL LIGATION           Social History     Socioeconomic History   • Marital status:    Tobacco Use   • Smoking status: Former     Packs/day: 1.00     Years: 30.00     Pack years: 30.00     Types: Cigarettes     Start date: 1985     Quit date: 2020     Years since quittin.1   • Smokeless tobacco: Never   Vaping Use   • Vaping Use: Never used   Substance and Sexual Activity   • Alcohol use: Yes     Comment: rarely 2 times per year   • Drug use: Never   • Sexual activity: Defer         Family History   Problem Relation Age of Onset   • Arthritis Mother    • Diabetes Mother    • Hypertension Mother    • Osteoporosis Mother    • Thyroid disease Mother    • Hyperlipidemia Mother    • Colon polyps Mother    • Cancer Father    • Kidney disease Father    • Breast cancer Maternal Aunt    • Colon cancer Neg Hx    • Ovarian cancer Neg Hx        Objective  Physical Exam:  /100 (BP Location: Left arm, Patient Position: Sitting)   Pulse 112   Temp 98.5 °F (36.9 °C) (Axillary)   Resp 18   Ht 162.6 cm (64\")   Wt 88.5 kg (195 lb)   LMP  (LMP Unknown)   SpO2 98%   BMI 33.47 kg/m²    CONSTITUTIONAL: Well developed, nontoxic healthy-appearing 52-year-old female,  in no acute distress.  VITAL SIGNS: per nursing, reviewed and noted  SKIN: exposed skin with no rashes, ulcerations or petechiae  EYES: Grossly EOMI, no icterus  ENT: Normal voice.  Patient maintained wearing a mask throughout patient encounter due to coronavirus pandemic  RESPIRATORY:  No increased work of breathing. No " retractions.   CARDIOVASCULAR:   Good Peripheral pulses. Good cap refill to extremities.   GI: Abdomen soft, nontender,   MUSCULOSKELETAL: Age-appropriate bulk and tone, moves all 4 extremities  NEUROLOGIC: Alert, oriented x 3. No gross deficits. GCS 15.   PSYCH: appropriate affect.  : Mild right CVA tenderness    Procedures    ED Course:         Lab Results (last 24 hours)     Procedure Component Value Units Date/Time    CBC & Differential [[id number]]  (Abnormal) Collected: 12/24/22 1955    Specimen: Blood Updated: 12/24/22 2006    Narrative:      The following orders were created for panel order CBC & Differential.  Procedure                               Abnormality         Status                     ---------                               -----------         ------                     CBC Auto Differential[[id number]]        Abnormal            Final result                 Please view results for these tests on the individual orders.    Comprehensive Metabolic Panel [[id number]]  (Abnormal) Collected: 12/24/22 1955    Specimen: Blood Updated: 12/24/22 2019     Glucose 105 mg/dL      BUN 19 mg/dL      Creatinine 1.60 mg/dL      Sodium 140 mmol/L      Potassium 4.3 mmol/L      Chloride 105 mmol/L      CO2 24.0 mmol/L      Calcium 10.3 mg/dL      Total Protein 7.6 g/dL      Albumin 4.50 g/dL      ALT (SGPT) 18 U/L      AST (SGOT) 20 U/L      Alkaline Phosphatase 101 U/L      Total Bilirubin <0.2 mg/dL      Globulin 3.1 gm/dL      A/G Ratio 1.5 g/dL      BUN/Creatinine Ratio 11.9     Anion Gap 11.0 mmol/L      eGFR 38.4 mL/min/1.73      Comment: National Kidney Foundation and American Society of Nephrology (ASN) Task Force recommended calculation based on the Chronic Kidney Disease Epidemiology Collaboration (CKD-EPI) equation refit without adjustment for race.       Narrative:      GFR Normal >60  Chronic Kidney Disease <60  Kidney Failure <15      CBC Auto Differential [[id number]]  (Abnormal) Collected: 12/24/22  1955    Specimen: Blood Updated: 12/24/22 2006     WBC 20.76 10*3/mm3      RBC 4.66 10*6/mm3      Hemoglobin 13.2 g/dL      Hematocrit 41.0 %      MCV 88.0 fL      MCH 28.3 pg      MCHC 32.2 g/dL      RDW 13.8 %      RDW-SD 44.0 fl      MPV 8.9 fL      Platelets 432 10*3/mm3      Neutrophil % 77.8 %      Lymphocyte % 14.0 %      Monocyte % 5.3 %      Eosinophil % 1.6 %      Basophil % 0.6 %      Immature Grans % 0.7 %      Neutrophils, Absolute 16.15 10*3/mm3      Lymphocytes, Absolute 2.90 10*3/mm3      Monocytes, Absolute 1.11 10*3/mm3      Eosinophils, Absolute 0.33 10*3/mm3      Basophils, Absolute 0.12 10*3/mm3      Immature Grans, Absolute 0.15 10*3/mm3      nRBC 0.0 /100 WBC     Urinalysis With Microscopic If Indicated (No Culture) - Urine, Clean Catch [[id number]]  (Abnormal) Collected: 12/24/22 2043    Specimen: Urine, Clean Catch Updated: 12/24/22 2100     Color, UA Dark Yellow     Appearance, UA Cloudy     pH, UA 5.5     Specific Gravity, UA 1.018     Glucose, UA Negative     Ketones, UA Negative     Bilirubin, UA Negative     Blood, UA Large (3+)     Protein, UA 30 mg/dL (1+)     Leuk Esterase, UA Trace     Nitrite, UA Negative     Urobilinogen, UA 0.2 E.U./dL    Urinalysis, Microscopic Only - Urine, Clean Catch [[id number]]  (Abnormal) Collected: 12/24/22 2043    Specimen: Urine, Clean Catch Updated: 12/24/22 2105     RBC, UA 21-30 /HPF      WBC, UA 0-2 /HPF      Bacteria, UA Trace /HPF      Squamous Epithelial Cells, UA 0-2 /HPF      Hyaline Casts, UA 0-2 /LPF      Methodology Manual Light Microscopy           No radiology results from the last 24 hrs       MDM  Patient presented for evaluation of right-sided flank pain with known kidney stone.  Attached is impression from CT report dated 12/21/2022  IMPRESSION:  1. Moderate right hydronephrosis and hydroureter secondary to a  mid-distal right ureteral stone measuring 4 x 6 mm.  2. Dominant nonobstructing left renal stone.     This study was performed  with techniques to keep radiation doses as low  as reasonably achievable (ALARA). Individualized dose reduction  techniques using automated exposure control or adjustment of vA and/or  kV according to the patient size were employed.      This report was signed and finalized on 12/21/2022 1:51 PM by Vipul Rose MD.    Patient does have microscopic hematuria without infection on UA, reassuring CBC CMP.  Received IV fluids, Percocet, Toradol, lidocaine infusion.  Yoshi reviewed.  Will discharge with Percocet, Flomax, Toradol, Zofran.  Outpatient follow-up with urologist, return precautions discussed.  Final diagnoses:   Renal colic   Ureteral stone with hydronephrosis        Livan Wilson, DO  12/24/22 9580

## 2022-12-30 ENCOUNTER — OFFICE VISIT (OUTPATIENT)
Dept: UROLOGY | Facility: CLINIC | Age: 52
End: 2022-12-30

## 2022-12-30 VITALS
OXYGEN SATURATION: 97 % | DIASTOLIC BLOOD PRESSURE: 88 MMHG | HEART RATE: 107 BPM | WEIGHT: 202 LBS | SYSTOLIC BLOOD PRESSURE: 122 MMHG | BODY MASS INDEX: 34.67 KG/M2 | TEMPERATURE: 97.4 F

## 2022-12-30 DIAGNOSIS — N20.0 KIDNEY STONE: Primary | ICD-10-CM

## 2022-12-30 LAB
BILIRUB BLD-MCNC: ABNORMAL MG/DL
CLARITY, POC: ABNORMAL
COLOR UR: ABNORMAL
EXPIRATION DATE: ABNORMAL
GLUCOSE UR STRIP-MCNC: NEGATIVE MG/DL
KETONES UR QL: NEGATIVE
LEUKOCYTE EST, POC: NEGATIVE
Lab: ABNORMAL
NITRITE UR-MCNC: NEGATIVE MG/ML
PH UR: 7 [PH] (ref 5–8)
PROT UR STRIP-MCNC: ABNORMAL MG/DL
RBC # UR STRIP: ABNORMAL /UL
SP GR UR: 1.01 (ref 1–1.03)
UROBILINOGEN UR QL: ABNORMAL

## 2022-12-30 PROCEDURE — 99204 OFFICE O/P NEW MOD 45 MIN: CPT | Performed by: PHYSICIAN ASSISTANT

## 2022-12-30 RX ORDER — SEMAGLUTIDE 2.68 MG/ML
INJECTION, SOLUTION SUBCUTANEOUS
COMMUNITY
Start: 2022-12-02 | End: 2023-01-12

## 2022-12-30 RX ORDER — METFORMIN HYDROCHLORIDE 500 MG/1
500 TABLET, EXTENDED RELEASE ORAL
COMMUNITY
Start: 2022-10-30

## 2022-12-30 RX ORDER — FLUOXETINE HYDROCHLORIDE 40 MG/1
CAPSULE ORAL
COMMUNITY
Start: 2022-12-13 | End: 2023-01-04

## 2022-12-30 RX ORDER — FUROSEMIDE 20 MG/1
20 TABLET ORAL DAILY
COMMUNITY
End: 2023-01-04

## 2022-12-30 RX ORDER — CETIRIZINE HYDROCHLORIDE 10 MG/1
TABLET ORAL
COMMUNITY
Start: 2022-12-10

## 2022-12-30 RX ORDER — TOPIRAMATE 25 MG/1
TABLET ORAL
COMMUNITY
Start: 2022-12-28

## 2022-12-30 RX ORDER — CYCLOBENZAPRINE HCL 10 MG
10 TABLET ORAL 2 TIMES DAILY PRN
COMMUNITY
Start: 2022-10-18 | End: 2023-01-11 | Stop reason: SDUPTHER

## 2022-12-30 RX ORDER — EZETIMIBE 10 MG/1
10 TABLET ORAL DAILY
COMMUNITY
Start: 2022-11-09

## 2022-12-30 RX ORDER — ONDANSETRON 4 MG/1
4 TABLET, ORALLY DISINTEGRATING ORAL
COMMUNITY
End: 2023-01-12

## 2022-12-30 RX ORDER — TIRZEPATIDE 2.5 MG/.5ML
INJECTION, SOLUTION SUBCUTANEOUS
COMMUNITY
Start: 2022-11-01 | End: 2023-01-12

## 2022-12-30 NOTE — PROGRESS NOTES
Chief Complaint  Kidney Stone    Bhumika Lucero APRN    HPI  Ms. Hill is a 52 y.o. female with history of nephrolithiasis who presents for further management of nephrolithiasis.    She presented to her primary care on  and was diagnosed with a 4 x 6 mm distal right ureteral stone. She presents today for follow up.  She is managing pain.  No fever, chills, nausea, vomiting.  No dysuria.      Past Medical History  Past Medical History:   Diagnosis Date   • Abdominal pain    • Abnormal weight gain    • Anxiety    • Bipolar affective disorder (HCC)    • Cholelithiasis     removed   • Colon polyp     had over 20 polyps   • Depression    • Elevated cholesterol    • Elevated white blood cell count    • Excessive sweating    • Hematuria    • Hemorrhoids    • Hiatal hernia    • History of psoriasis    • History of thyroid nodule    • Hyperlipidemia    • Kidney stone    • Leukocytosis    • Lumbago    • Migraines    • Nephrolithiasis     left   • Ovarian cyst     left   • Porphyria (HCC)    • Prediabetes    • Sleep apnea    • Snores    • Tobacco use    • Toe infection    • Upper respiratory infection    • Vaginitis    • Visit for screening mammogram        Past Surgical History  Past Surgical History:   Procedure Laterality Date   • ABDOMINOPLASTY     • BREAST RECONSTRUCTION Bilateral 2010    Breast Lift   •  SECTION      x2   • CHOLECYSTECTOMY     • COLONOSCOPY     • COLONOSCOPY N/A 10/26/2022    Procedure: COLONOSCOPY CPT CODE: 33513;  Surgeon: Lane Velazco MD;  Location: [facility];  Service: Gastroenterology;  Laterality: N/A;   • ENDOMETRIAL ABLATION     • ENDOSCOPY N/A 10/26/2022    Procedure: ESOPHAGOGASTRODUODENOSCOPY WITH BIOPSY CPT CODE: 19438;  Surgeon: Lane Velazco MD;  Location: [facility];  Service: Gastroenterology;  Laterality: N/A;   • HEMORRHOIDECTOMY      had 3 removed   • KIDNEY STONE SURGERY     • TONSILLECTOMY     • TUBAL  ABDOMINAL LIGATION  1993       Medications    Current Outpatient Medications:   •  buPROPion XL (WELLBUTRIN XL) 300 MG 24 hr tablet, Take 300 mg by mouth Daily., Disp: , Rfl:   •  Certolizumab Pegol (Cimzia) 2 X 200 MG kit, Inject  under the skin into the appropriate area as directed. 1 x's month, Disp: , Rfl:   •  cetirizine (zyrTEC) 10 MG tablet, , Disp: , Rfl:   •  Cholecalciferol (Vitamin D) 50 MCG (2000 UT) tablet, 1 tablet Daily., Disp: , Rfl:   •  Cimzia Prefilled 2 X 200 MG/ML Prefilled Syringe Kit, , Disp: , Rfl:   •  cyclobenzaprine (FLEXERIL) 10 MG tablet, , Disp: , Rfl:   •  ezetimibe (ZETIA) 10 MG tablet, , Disp: , Rfl:   •  FLUoxetine (PROzac) 20 MG capsule, Take 3 capsules daily (Patient taking differently: Take  by mouth Daily. Take 3 capsules daily), Disp: 270 capsule, Rfl: 2  •  FLUoxetine (PROzac) 40 MG capsule, , Disp: , Rfl:   •  furosemide (LASIX) 20 MG tablet, Take 1 tablet by mouth Daily As Needed. PRN, Disp: , Rfl:   •  furosemide (LASIX) 20 MG tablet, Take 20 mg by mouth Daily., Disp: , Rfl:   •  Hydrocortisone, Perianal, (Anusol-HC) 2.5 % rectal cream, Insert  into the rectum 2 (Two) Times a Day., Disp: 28 g, Rfl: 1  •  ketorolac (TORADOL) 10 MG tablet, Take 1 tablet by mouth Every 6 (Six) Hours As Needed for Moderate Pain., Disp: 20 tablet, Rfl: 0  •  linaclotide (LINZESS) 145 MCG capsule capsule, Take 145 mcg by mouth Daily., Disp: , Rfl:   •  linaclotide (Linzess) 72 MCG capsule capsule, Take 1 capsule by mouth Every Morning Before Breakfast. Wait 30 mins before eating., Disp: 30 capsule, Rfl: 5  •  losartan (COZAAR) 50 MG tablet, Take 50 mg by mouth Daily., Disp: , Rfl:   •  metFORMIN (GLUCOPHAGE) 500 MG tablet, Take 1 tablet by mouth Daily With Breakfast., Disp: , Rfl:   •  metFORMIN ER (GLUCOPHAGE-XR) 500 MG 24 hr tablet, , Disp: , Rfl:   •  Misc. Devices (SITZ BATH) misc, Use with warm water 2-3 times daily for 15-20 minutes each, Disp: 1 each, Rfl: 0  •  Mounjaro 2.5 MG/0.5ML  solution pen-injector, , Disp: , Rfl:   •  ondansetron ODT (ZOFRAN-ODT) 4 MG disintegrating tablet, Place 1 tablet on the tongue Every 8 (Eight) Hours As Needed for Nausea., Disp: 12 tablet, Rfl: 0  •  ondansetron ODT (ZOFRAN-ODT) 4 MG disintegrating tablet, Take 4 mg by mouth., Disp: , Rfl:   •  oxyCODONE-acetaminophen (PERCOCET) 5-325 MG per tablet, Take 1 tablet by mouth Every 6 (Six) Hours As Needed for Severe Pain., Disp: 12 tablet, Rfl: 0  •  Ozempic, 2 MG/DOSE, 8 MG/3ML solution pen-injector, , Disp: , Rfl:   •  pantoprazole (PROTONIX) 40 MG EC tablet, TAKE ONE TABLET BY MOUTH DAILY 30 MINUTES BEFORE BREAKFAST, Disp: 30 tablet, Rfl: 3  •  phentermine (ADIPEX-P) 37.5 MG tablet, Take 37.5 mg by mouth Every Morning Before Breakfast., Disp: , Rfl:   •  Semaglutide (OZEMPIC, 2 MG/DOSE, SC), Inject  under the skin into the appropriate area as directed 1 (One) Time Per Week., Disp: , Rfl:   •  tamsulosin (FLOMAX) 0.4 MG capsule 24 hr capsule, Take 1 capsule by mouth Every Night., Disp: 10 capsule, Rfl: 0  •  topiramate (TOPAMAX) 25 MG tablet, , Disp: , Rfl:   •  traZODone (DESYREL) 50 MG tablet, Take 1 tablet by mouth At Night As Needed., Disp: , Rfl:   •  Ezetimibe (ZETIA PO), Take 10 mg by mouth Daily., Disp: , Rfl:     Allergies  Allergies   Allergen Reactions   • Bee Venom Anaphylaxis   • Codeine Hives       Social History  Social History     Socioeconomic History   • Marital status:    Tobacco Use   • Smoking status: Former     Packs/day: 1.00     Years: 30.00     Pack years: 30.00     Types: Cigarettes     Start date: 1985     Quit date: 2020     Years since quittin.1   • Smokeless tobacco: Never   Vaping Use   • Vaping Use: Never used   Substance and Sexual Activity   • Alcohol use: Yes     Comment: rarely 2 times per year   • Drug use: Never   • Sexual activity: Defer       Family History  Family History   Problem Relation Age of Onset   • Arthritis Mother    • Diabetes Mother    •  Hypertension Mother    • Osteoporosis Mother    • Thyroid disease Mother    • Hyperlipidemia Mother    • Colon polyps Mother    • Cancer Father    • Kidney disease Father    • Breast cancer Maternal Aunt    • Colon cancer Neg Hx    • Ovarian cancer Neg Hx        Physical Exam  Visit Vitals  /88   Pulse 107   Temp 97.4 °F (36.3 °C)   Wt 91.6 kg (202 lb)   LMP  (LMP Unknown)   SpO2 97%   BMI 34.67 kg/m²       Labs  Lab Results   Component Value Date    GLUCOSE 105 (H) 12/24/2022    BUN 19 12/24/2022    CREATININE 1.60 (H) 12/24/2022    EGFRIFNONA 54 (L) 09/24/2021    BCR 11.9 12/24/2022    K 4.3 12/24/2022    CO2 24.0 12/24/2022    CALCIUM 10.3 12/24/2022    ALBUMIN 4.50 12/24/2022    LABIL2 1.6 01/20/2016    AST 20 12/24/2022    ALT 18 12/24/2022       Lab Results   Component Value Date    WBC 20.76 (H) 12/24/2022    HGB 13.2 12/24/2022    HCT 41.0 12/24/2022    MCV 88.0 12/24/2022     12/24/2022       LDH   Date Value Ref Range Status   11/21/2022 185 116 - 250 U/L Final       Lab Results   Component Value Date    CALCIUM 10.3 12/24/2022       Brief Urine Lab Results  (Last result in the past 365 days)      Color   Clarity   Blood   Leuk Est   Nitrite   Protein   CREAT   Urine HCG        12/30/22 0926 Arlette   Cloudy   Small   Negative   Negative   1+                   Radiologic Studies  CT Abdomen Pelvis Stone Protocol  Result Date: 12/21/2022  1. Moderate right hydronephrosis and hydroureter secondary to a mid-distal right ureteral stone measuring 4 x 6 mm. 2. Dominant nonobstructing left renal stone.  This study was performed with techniques to keep radiation doses as low as reasonably achievable (ALARA). Individualized dose reduction techniques using automated exposure control or adjustment of vA and/or kV according to the patient size were employed.  This report was signed and finalized on 12/21/2022 1:51 PM by Vipul Rose MD.      I have personally reviewed these labs and  images.      Assessment  Ms. Hill is a 52 y.o. female with recurrent nephrolithiasis presenting with symptomatic 4 x 6 distal right ureteral stone.    We had informed discussion about the causes of stones and the main treatments for nephrolithiasis. We discussed observation vs treatment. We discussed she has a 40% chance of passage given that her stone is 6 mm in greatest dimension.  She has elected to proceed with surgery.  She has previously had ureteroscopy with laser lithotripsy and stent placement and is comfortable having the surgery repeated.    The patient is discussed with Dr. Gonzalez who independently reviews and interprets associated imaging. Based on patient factors and the stone size and location, Dr. Gonzalez recommends right ureteroscopy with laser lithotripsy.  We discussed the risks, benefits, and alternatives to this therapy.  The main risks that we discussed were bleeding, urinary infection, damage to nearby structures, need for further procedures, and cardiopulmonary complications from anesthesia.  The patient voiced understanding and wished to proceed.     She is very interested in having a Litholink in the future to discuss preventive medications.    Plan  1. Consented for right URS/HLL and stent   2. Increase water intake >2L / day  3.  Interested in Litholink after surgery

## 2022-12-31 DIAGNOSIS — N20.0 KIDNEY STONE: Primary | ICD-10-CM

## 2022-12-31 RX ORDER — ACETAMINOPHEN 325 MG/1
975 TABLET ORAL ONCE
Status: CANCELLED | OUTPATIENT
Start: 2022-12-31 | End: 2022-12-31

## 2022-12-31 RX ORDER — SODIUM CHLORIDE 9 MG/ML
100 INJECTION, SOLUTION INTRAVENOUS CONTINUOUS
Status: CANCELLED | OUTPATIENT
Start: 2022-12-31

## 2023-01-03 ENCOUNTER — TELEPHONE (OUTPATIENT)
Dept: UROLOGY | Facility: CLINIC | Age: 53
End: 2023-01-03

## 2023-01-03 ENCOUNTER — TELEPHONE (OUTPATIENT)
Dept: PREADMISSION TESTING | Facility: HOSPITAL | Age: 53
End: 2023-01-03

## 2023-01-03 DIAGNOSIS — N20.0 KIDNEY STONE: Primary | ICD-10-CM

## 2023-01-03 NOTE — TELEPHONE ENCOUNTER
Caller: Ashleigh Hill    Relationship to patient: SELF    Best call back number: [phone]    Patient is needing: PT CALLED AND IS WANTING TO KNOW THE STATUS OF A CT SCAN. SHE WOULD LIKE CALL BACK IN REGARDS TO THE ORDER. PT HAS SURGERY 01/05/22 WITH DR CORRAL AND WAS WANTING TO KNOW IF SHE NEEDS TO HAVE TO DONE BEFORE THE SURGERY

## 2023-01-04 ENCOUNTER — PREP FOR SURGERY (OUTPATIENT)
Dept: OTHER | Facility: HOSPITAL | Age: 53
End: 2023-01-04
Payer: MEDICAID

## 2023-01-04 ENCOUNTER — PRE-ADMISSION TESTING (OUTPATIENT)
Dept: PREADMISSION TESTING | Facility: HOSPITAL | Age: 53
End: 2023-01-04
Payer: MEDICAID

## 2023-01-04 VITALS — BODY MASS INDEX: 34.42 KG/M2 | WEIGHT: 201.6 LBS | HEIGHT: 64 IN

## 2023-01-04 LAB — B-HCG UR QL: NEGATIVE

## 2023-01-04 PROCEDURE — 81025 URINE PREGNANCY TEST: CPT

## 2023-01-04 RX ORDER — CEFAZOLIN SODIUM 2 G/50ML
2 SOLUTION INTRAVENOUS ONCE
Status: CANCELLED | OUTPATIENT
Start: 2023-01-04 | End: 2023-01-04

## 2023-01-04 RX ORDER — ACETAMINOPHEN 325 MG/1
975 TABLET ORAL ONCE
Status: CANCELLED | OUTPATIENT
Start: 2023-01-04 | End: 2023-01-04

## 2023-01-04 RX ORDER — SODIUM CHLORIDE 9 MG/ML
100 INJECTION, SOLUTION INTRAVENOUS CONTINUOUS
Status: CANCELLED | OUTPATIENT
Start: 2023-01-04

## 2023-01-04 NOTE — DISCHARGE INSTRUCTIONS
PAT phone history completed with pt for upcoming procedure      PAT PASS GIVEN/REVIEWED WITH PT.  VERBALIZED UNDERSTANDING OF THE FOLLOWING:  DO NOT EAT, DRINK, SMOKE, USE SMOKELESS TOBACCO OR CHEW GUM AFTER MIDNIGHT THE NIGHT BEFORE SURGERY.  THIS ALSO INCLUDES HARD CANDIES AND MINTS.    DO NOT SHAVE THE AREA TO BE OPERATED ON AT LEAST 48 HOURS PRIOR TO THE PROCEDURE.  DO NOT WEAR MAKE UP OR NAIL POLISH.  DO NOT LEAVE IN ANY PIERCING OR WEAR JEWELRY THE DAY OF SURGERY.      DO NOT USE ADHESIVES IF YOU WEAR DENTURES.    DO NOT WEAR EYE CONTACTS; BRING IN YOUR GLASSES.    ONLY TAKE MEDICATION THE MORNING OF YOUR PROCEDURE IF INSTRUCTED BY YOUR SURGEON WITH ENOUGH WATER TO SWALLOW THE MEDICATION.  IF YOUR SURGEON DID NOT SPECIFY WHICH MEDICATIONS TO TAKE, YOU WILL NEED TO CALL THEIR OFFICE FOR FURTHER INSTRUCTIONS AND DO AS THEY INSTRUCT.    LEAVE ANYTHING YOU CONSIDER VALUABLE AT HOME.    YOU WILL NEED TO ARRANGE FOR SOMEONE TO DRIVE YOU HOME AFTER SURGERY.  IT IS RECOMMENDED THAT YOU DO NOT DRIVE, WORK, DRINK ALCOHOL OR MAKE MAJOR DECISIONS FOR AT LEAST 24 HOURS AFTER YOUR PROCEDURE IS COMPLETE.      THE DAY OF YOUR PROCEDURE, BRING IN THE FOLLOWING IF APPLICABLE:   PICTURE ID AND INSURANCE/MEDICARE OR MEDICAID CARDS/ANY CO-PAY THAT MAY BE DUE   COPY OF ADVANCED DIRECTIVE/LIVING WILL/POWER OR    CPAP/BIPAP/INHALERS   SKIN PREP SHEET   YOUR PREADMISSION TESTING PASS (IF NOT A PHONE HISTORY)

## 2023-01-05 ENCOUNTER — HOSPITAL ENCOUNTER (OUTPATIENT)
Facility: HOSPITAL | Age: 53
Setting detail: HOSPITAL OUTPATIENT SURGERY
Discharge: HOME | End: 2023-01-05
Attending: UROLOGY | Admitting: UROLOGY
Payer: MEDICAID

## 2023-01-05 ENCOUNTER — ANESTHESIA EVENT (OUTPATIENT)
Dept: PERIOP | Facility: HOSPITAL | Age: 53
End: 2023-01-05
Payer: MEDICAID

## 2023-01-05 ENCOUNTER — ANESTHESIA (OUTPATIENT)
Dept: PERIOP | Facility: HOSPITAL | Age: 53
End: 2023-01-05
Payer: MEDICAID

## 2023-01-05 VITALS
TEMPERATURE: 97.8 F | RESPIRATION RATE: 18 BRPM | SYSTOLIC BLOOD PRESSURE: 130 MMHG | HEART RATE: 89 BPM | OXYGEN SATURATION: 99 % | DIASTOLIC BLOOD PRESSURE: 86 MMHG

## 2023-01-05 DIAGNOSIS — N20.0 CALCULUS OF KIDNEY: Primary | ICD-10-CM

## 2023-01-05 DIAGNOSIS — N20.0 KIDNEY STONE: ICD-10-CM

## 2023-01-05 LAB — GLUCOSE BLDC GLUCOMTR-MCNC: 93 MG/DL (ref 70–130)

## 2023-01-05 PROCEDURE — 25010000002 FENTANYL CITRATE (PF) 100 MCG/2ML SOLUTION: Performed by: NURSE ANESTHETIST, CERTIFIED REGISTERED

## 2023-01-05 PROCEDURE — 74420 UROGRAPHY RTRGR +-KUB: CPT | Performed by: UROLOGY

## 2023-01-05 PROCEDURE — 25010000002 ONDANSETRON PER 1 MG: Performed by: NURSE ANESTHETIST, CERTIFIED REGISTERED

## 2023-01-05 PROCEDURE — 25010000002 MIDAZOLAM PER 1MG: Performed by: NURSE ANESTHETIST, CERTIFIED REGISTERED

## 2023-01-05 PROCEDURE — 0 CEFAZOLIN SODIUM-DEXTROSE 2-3 GM-%(50ML) RECONSTITUTED SOLUTION: Performed by: UROLOGY

## 2023-01-05 PROCEDURE — 25010000002 IOPAMIDOL 61 % SOLUTION: Performed by: UROLOGY

## 2023-01-05 PROCEDURE — C1769 GUIDE WIRE: HCPCS | Performed by: UROLOGY

## 2023-01-05 PROCEDURE — C1758 CATHETER, URETERAL: HCPCS | Performed by: UROLOGY

## 2023-01-05 PROCEDURE — 25010000002 DEXAMETHASONE PER 1 MG: Performed by: NURSE ANESTHETIST, CERTIFIED REGISTERED

## 2023-01-05 PROCEDURE — 52356 CYSTO/URETERO W/LITHOTRIPSY: CPT | Performed by: UROLOGY

## 2023-01-05 PROCEDURE — C2617 STENT, NON-COR, TEM W/O DEL: HCPCS | Performed by: UROLOGY

## 2023-01-05 PROCEDURE — 82962 GLUCOSE BLOOD TEST: CPT

## 2023-01-05 PROCEDURE — 25010000002 PROPOFOL 200 MG/20ML EMULSION: Performed by: NURSE ANESTHETIST, CERTIFIED REGISTERED

## 2023-01-05 DEVICE — URETERAL STENT
Type: IMPLANTABLE DEVICE | Site: URETER | Status: FUNCTIONAL
Brand: CONTOUR™

## 2023-01-05 RX ORDER — SULFAMETHOXAZOLE AND TRIMETHOPRIM 800; 160 MG/1; MG/1
1 TABLET ORAL 2 TIMES DAILY
Qty: 6 TABLET | Refills: 0 | Status: SHIPPED | OUTPATIENT
Start: 2023-01-05 | End: 2023-01-12

## 2023-01-05 RX ORDER — PHENAZOPYRIDINE HYDROCHLORIDE 100 MG/1
100 TABLET, FILM COATED ORAL 3 TIMES DAILY PRN
Qty: 21 TABLET | Refills: 0 | Status: SHIPPED | OUTPATIENT
Start: 2023-01-05 | End: 2023-01-12

## 2023-01-05 RX ORDER — LORAZEPAM 2 MG/ML
1 INJECTION INTRAMUSCULAR
Status: DISCONTINUED | OUTPATIENT
Start: 2023-01-05 | End: 2023-01-05 | Stop reason: HOSPADM

## 2023-01-05 RX ORDER — MAGNESIUM HYDROXIDE 1200 MG/15ML
LIQUID ORAL AS NEEDED
Status: DISCONTINUED | OUTPATIENT
Start: 2023-01-05 | End: 2023-01-05 | Stop reason: HOSPADM

## 2023-01-05 RX ORDER — MIDAZOLAM HYDROCHLORIDE 2 MG/2ML
INJECTION, SOLUTION INTRAMUSCULAR; INTRAVENOUS AS NEEDED
Status: DISCONTINUED | OUTPATIENT
Start: 2023-01-05 | End: 2023-01-05 | Stop reason: SURG

## 2023-01-05 RX ORDER — OXYCODONE HYDROCHLORIDE 5 MG/1
5 TABLET ORAL EVERY 6 HOURS PRN
Qty: 5 TABLET | Refills: 0 | Status: SHIPPED | OUTPATIENT
Start: 2023-01-05 | End: 2023-01-12

## 2023-01-05 RX ORDER — PROCHLORPERAZINE EDISYLATE 5 MG/ML
10 INJECTION INTRAMUSCULAR; INTRAVENOUS ONCE AS NEEDED
Status: DISCONTINUED | OUTPATIENT
Start: 2023-01-05 | End: 2023-01-05 | Stop reason: HOSPADM

## 2023-01-05 RX ORDER — ONDANSETRON 2 MG/ML
INJECTION INTRAMUSCULAR; INTRAVENOUS AS NEEDED
Status: DISCONTINUED | OUTPATIENT
Start: 2023-01-05 | End: 2023-01-05 | Stop reason: SURG

## 2023-01-05 RX ORDER — PROPOFOL 10 MG/ML
INJECTION, EMULSION INTRAVENOUS AS NEEDED
Status: DISCONTINUED | OUTPATIENT
Start: 2023-01-05 | End: 2023-01-05 | Stop reason: SURG

## 2023-01-05 RX ORDER — ACETAMINOPHEN 325 MG/1
975 TABLET ORAL ONCE
Status: COMPLETED | OUTPATIENT
Start: 2023-01-05 | End: 2023-01-05

## 2023-01-05 RX ORDER — SODIUM CHLORIDE 9 MG/ML
100 INJECTION, SOLUTION INTRAVENOUS CONTINUOUS
Status: DISCONTINUED | OUTPATIENT
Start: 2023-01-05 | End: 2023-01-05 | Stop reason: HOSPADM

## 2023-01-05 RX ORDER — ACETAMINOPHEN 500 MG
1000 TABLET ORAL EVERY 6 HOURS
Qty: 30 TABLET | Refills: 0 | Status: SHIPPED | OUTPATIENT
Start: 2023-01-05 | End: 2023-01-09

## 2023-01-05 RX ORDER — FENTANYL CITRATE 50 UG/ML
INJECTION, SOLUTION INTRAMUSCULAR; INTRAVENOUS AS NEEDED
Status: DISCONTINUED | OUTPATIENT
Start: 2023-01-05 | End: 2023-01-05 | Stop reason: SURG

## 2023-01-05 RX ORDER — MEPERIDINE HYDROCHLORIDE 25 MG/ML
25 INJECTION INTRAMUSCULAR; INTRAVENOUS; SUBCUTANEOUS
Status: DISCONTINUED | OUTPATIENT
Start: 2023-01-05 | End: 2023-01-05 | Stop reason: HOSPADM

## 2023-01-05 RX ORDER — LIDOCAINE HYDROCHLORIDE 20 MG/ML
INJECTION, SOLUTION INTRAVENOUS AS NEEDED
Status: DISCONTINUED | OUTPATIENT
Start: 2023-01-05 | End: 2023-01-05 | Stop reason: SURG

## 2023-01-05 RX ORDER — DOCUSATE SODIUM 100 MG/1
100 CAPSULE, LIQUID FILLED ORAL 2 TIMES DAILY
Qty: 15 CAPSULE | Refills: 1 | Status: SHIPPED | OUTPATIENT
Start: 2023-01-05 | End: 2023-01-12 | Stop reason: SDUPTHER

## 2023-01-05 RX ORDER — DEXAMETHASONE SODIUM PHOSPHATE 4 MG/ML
INJECTION, SOLUTION INTRA-ARTICULAR; INTRALESIONAL; INTRAMUSCULAR; INTRAVENOUS; SOFT TISSUE AS NEEDED
Status: DISCONTINUED | OUTPATIENT
Start: 2023-01-05 | End: 2023-01-05 | Stop reason: SURG

## 2023-01-05 RX ORDER — CEFAZOLIN SODIUM 2 G/50ML
2 SOLUTION INTRAVENOUS ONCE
Status: COMPLETED | OUTPATIENT
Start: 2023-01-05 | End: 2023-01-05

## 2023-01-05 RX ORDER — TAMSULOSIN HYDROCHLORIDE 0.4 MG/1
1 CAPSULE ORAL NIGHTLY
Qty: 10 CAPSULE | Refills: 0 | Status: SHIPPED | OUTPATIENT
Start: 2023-01-05

## 2023-01-05 RX ORDER — OXYBUTYNIN CHLORIDE 10 MG/1
10 TABLET, EXTENDED RELEASE ORAL DAILY PRN
Qty: 10 TABLET | Refills: 0 | Status: SHIPPED | OUTPATIENT
Start: 2023-01-05

## 2023-01-05 RX ADMIN — DEXAMETHASONE SODIUM PHOSPHATE 4 MG: 4 INJECTION, SOLUTION INTRAMUSCULAR; INTRAVENOUS at 12:48

## 2023-01-05 RX ADMIN — LIDOCAINE HYDROCHLORIDE 60 MG: 20 INJECTION, SOLUTION INTRAVENOUS at 12:26

## 2023-01-05 RX ADMIN — MIDAZOLAM HYDROCHLORIDE 2 MG: 1 INJECTION, SOLUTION INTRAMUSCULAR; INTRAVENOUS at 12:26

## 2023-01-05 RX ADMIN — CEFAZOLIN SODIUM 2 G: 2 SOLUTION INTRAVENOUS at 12:26

## 2023-01-05 RX ADMIN — SODIUM CHLORIDE 100 ML/HR: 9 INJECTION, SOLUTION INTRAVENOUS at 11:30

## 2023-01-05 RX ADMIN — FENTANYL CITRATE 100 MCG: 50 INJECTION INTRAMUSCULAR; INTRAVENOUS at 12:26

## 2023-01-05 RX ADMIN — ACETAMINOPHEN 975 MG: 325 TABLET, FILM COATED ORAL at 11:21

## 2023-01-05 RX ADMIN — ONDANSETRON 4 MG: 2 INJECTION INTRAMUSCULAR; INTRAVENOUS at 12:48

## 2023-01-05 RX ADMIN — PROPOFOL 200 MG: 10 INJECTION, EMULSION INTRAVENOUS at 12:26

## 2023-01-05 NOTE — DISCHARGE INSTRUCTIONS
Home Care After Ureteroscopy and Laser Lithotripsy  The following instructions will help you care for yourself, or be cared for upon your return home today.    These are guidelines for your care right after surgery only.     Diet  Drink plenty of liquids and eat light meals today.    Start your regular diet tomorrow.    Activity  Start normal activities in twenty-four (24) hours.    Wound Care and Hygiene  No restrictions, start normal routine.    Anesthesia Precautions & Expectations  After anesthesia, rest for 24 hours.    Do not drive, drink alcoholic beverages or make any important decisions during this time.  General anesthesia may cause a sore throat, jaw discomfort or muscle aches.    These symptoms can last for one or two days.     What to Expect after Surgery  Mild pain with voiding.  Frequency or urgency.  Bladder cramps.  Minimal bleeding with voiding.    Call your Doctor  Passing clots in urine preventing bladder emptying  Severe pain not controlled by oral medication  Temperature above 101.5 degrees  Inability to urinate within eight (8) hours after surgery    After Stent Placement  It is common to have blood tinged urine for 3-5 days.  It is common to have pain in your side and in your back when you urinate for 3-5 days.  It is common to have urgency with urination.  This is a temporary stent and will need to be removed in the office in 1 week.  Do not take the Pyridium 24 hours prior to your stent removal.    Other Contacts  Urology Office:  [id number]   [place] [address] [phone] office  [phone] fax    Other Instructions  Take tamsulosin daily until the stent comes out.  Take oxybutynin daily as needed for bladder spasm while the stent is in.  Take Pyridium up to 3 times daily as needed for burning with urination.   Take Tylenol scheduled every 6 hours for the first 3 days.  Take the oxycodone on top of that as needed.  Take all of the antibiotics.     Follow up  Appointment  1 week for stent removal. Please See After visit Summary. Call if you do not have an appt already scheduled.     Please follow all post op instructions and follow up appointment time from your physician's office included in your discharge packet.    Rest today    .  To assist you in voiding:  Drink plenty of fluids  Listen to running water while attempting to void.    If you are unable to urinate and you have an uncomfortable urge to void or it has been   6 hours since you were discharged, return to the Emergency Room No pushing, pulling, tugging,  heavy lifting, or strenuous activity.  No major decision making, driving, or drinking alcoholic beverages for 24 hours. ( due to the medications you have  received)  Always use good hand hygiene/washing techniques.  NO driving while taking pain medications.    * if you have an incision:  Check your incision area every day for signs of infection.   Check for:  * more redness, swelling, or pain  *more fluid or blood  *warmth  *pus or bad smell

## 2023-01-05 NOTE — ANESTHESIA PROCEDURE NOTES
Airway  Urgency: elective    Date/Time: 1/5/2023 12:25 PM  Airway not difficult    General Information and Staff    Patient location during procedure: OR  CRNA/CAA: Austin Khan CRNA    Indications and Patient Condition  Indications for airway management: airway protection    Preoxygenated: yes  Mask difficulty assessment: 1 - vent by mask    Final Airway Details  Final airway type: supraglottic airway      Successful airway: classic  Size 4     Number of attempts at approach: 1  Assessment: lips, teeth, and gum same as pre-op and atraumatic intubation    Additional Comments  Cuff pressure/leak less than [id number]

## 2023-01-05 NOTE — OP NOTE
Preoperative diagnosis  right ureteral stone    Postoperative diagnosis  right ureteral stone    Procedure performed  1.  Flexible cystoscopy  2.  right retrograde pyelogram  3.  right ureteroscopy with holmium-YAG laser lithotripsy and basket extraction of stone fragments (32763)  4.  right ureteral stent placement 6 Fr x 24 cm  5.  Fluoroscopy time < 1 hour    Surgeon  Curry Gonzalez MD    Anesthesia  General    Complications  None    Specimen  Stone fragments for biochemical analysis    EBL  Minimal    Findings  Cystoscopy revealed no tumors, stones or other mucosal abnormalities.  Retrograde pyelogram revealed a delicate system with identification of the stones seen on imaging.    Ureteroscopy revealed impacted right distal ureteral stone, completely dusted and removed..     Indications  52 y.o. female with a history of obstructing 6 mm right distal ureteral stone agreed to undergo the above named procedure after discussion of the alternatives, risks and benefits. Informed consent was obtained.      Procedure  The patient was taken to the operating room and placed supine on the operating table.  Pre-operative antibiotics were administered.  Bilateral lower extremity SCDs were placed.  After induction of general anesthesia the patient was positioned in dorsal lithotomy, prepped and draped in a sterile fashion.  A time-out was performed.      A 14 [place]ayan flexible cystoscope was passed carefully via urethra into the bladder.  The right ureteral orifice was identified and a Sensor wire was passed retrograde to the level of the kidney and confirmed by fluoroscopy.  The flexible scope was off-loaded and the bladder emptied with a straight catheter.  An 8-10 coaxial dilator was passed without difficulty and then removed.  The semirigid ureteroscope was passed carefully along the Sensor wire through the urethra and into the distal ureter.  The stone was encountered and fragmented with a 365-micron holmium YAG laser  fiber at laser settings of 0.2 Joules and a frequency of 50 Hz.  The fragments were  extracted.  At the completion of the procedure, all clinically significant fragments were removed from the ureter and only dust-like fragments remained.  The ureteroscope was withdrawn.  A 5-Nigerien open-ended was passed over the wire and the wire removed.  A retrograde pyelogram was performed by slowly injecting 5 mL of 50% Omnipaque contrast.  A 6 Fr x 24 cm stent was positioned with the upper end in the upper pole and the lower in the bladder confirmed by fluoroscopy. The bladder was emptied and the procedure was complete. The patient tolerated the procedure well and was stable throughout.    The patient will follow up with me in 1 week for stent removal .

## 2023-01-05 NOTE — H&P
CC  No chief complaint on file.       HPI  Ashleigh Hill is a 52 y.o. with history of   1. Kidney stone         No recent fevers or new LUTS  Does not take any blood thinners    Past Medical History  Past Medical History:   Diagnosis Date   • Abdominal pain    • Abnormal weight gain    • Anxiety    • Bipolar affective disorder (HCC)    • Cholelithiasis 2011    removed   • Colon polyp 2015    had over 20 polyps   • Constipation    • Depression    • Elevated cholesterol    • Elevated white blood cell count    • Excessive sweating    • Hematuria    • Hemorrhoids    • Hiatal hernia    • History of psoriasis    • History of thyroid nodule    • Hyperlipidemia    • Kidney stone    • Leukocytosis    • Lumbago    • Migraines    • Nephrolithiasis     left   • Ovarian cyst     left   • Plantar fasciitis    • Porphyria (HCC)    • Prediabetes    • Sleep apnea    • Snores    • Tobacco use    • Toe infection    • Upper respiratory infection    • Uses contact lenses    • Vaginitis    • Visit for screening mammogram        Past Surgical History  Past Surgical History:   Procedure Laterality Date   • ABDOMINOPLASTY     • BREAST RECONSTRUCTION Bilateral 2010    Breast Lift   •  SECTION      x2   • CHOLECYSTECTOMY     • COLONOSCOPY     • COLONOSCOPY N/A 10/26/2022    Procedure: COLONOSCOPY CPT CODE: 42096;  Surgeon: Lane Velazco MD;  Location: [facility] ENDOSCOPY;  Service: Gastroenterology;  Laterality: N/A;   • ENDOMETRIAL ABLATION     • ENDOSCOPY N/A 10/26/2022    Procedure: ESOPHAGOGASTRODUODENOSCOPY WITH BIOPSY CPT CODE: 47655;  Surgeon: Lane Velazco MD;  Location: [facility] ENDOSCOPY;  Service: Gastroenterology;  Laterality: N/A;   • HEMORRHOIDECTOMY  2018    had 3 removed   • KIDNEY STONE SURGERY     • TOE SURGERY      \"needle removed from toe\"   • TONSILLECTOMY     • TUBAL ABDOMINAL LIGATION         Medications    Current Facility-Administered Medications:   •  acetaminophen  (TYLENOL) tablet 975 mg, 975 mg, Oral, Once, Curry Gonzalez MD  •  ceFAZolin Sodium-Dextrose (ANCEF) IVPB (duplex) 2 g, 2 g, Intravenous, Once, Curry Gonzalez MD  •  sodium chloride 0.9 % infusion, 100 mL/hr, Intravenous, Continuous, Curry Gonzalez MD    Allergies  Allergies   Allergen Reactions   • Bee Venom Anaphylaxis   • Codeine Hives       Social History  Social History     Socioeconomic History   • Marital status:    Tobacco Use   • Smoking status: Former     Packs/day: 1.00     Years: 30.00     Pack years: 30.00     Types: Cigarettes     Start date: 8/1/1985     Quit date: 11/1/2019     Years since quitting: 3.1   • Smokeless tobacco: Never   Vaping Use   • Vaping Use: Never used   Substance and Sexual Activity   • Alcohol use: Yes     Comment: rarely 2 times per year   • Drug use: Never   • Sexual activity: Defer       Review of Systems  Constitutional: No fevers or chills  Skin: Negative for rash  Endocrine: No heat/cold intolerance   Cardiovascular: Negative for chest pain or dyspnea on exertion  Respiratory: Negative for shortness of breath or wheezing  Gastrointestinal: No constipation, nausea or vomiting  Genitourinary: Negative for new lower urinary tract symptoms, current gross hematuria or dysuria.  Musculoskeletal: No flank pain  Neurological:  Negative for frequent headaches or dizziness  Lymph/Heme: Negative for leg swelling or calf pain.    Physical Exam  There were no vitals taken for this visit.  Constitutional: NAD, WDWN.   HEENT: NCAT. Conjunctivae normal.  MMM.    Cardiovascular: Regular rate.  Pulmonary/Chest: Respirations are even and non-labored bilaterally.  Abdominal: Soft. No distension, tenderness, masses or guarding. No CVA tenderness.  Neurological: A + O x 3.  Cranial Nerves II-XII grossly intact. Normal gait.  Extremities: JOSLYN x 4, Warm. No clubbing.  No cyanosis.    Skin: Pink, warm and dry.  No rashes noted.  Psychiatric:  Normal mood and  affect    Labs & Imaging  Lab Results   Component Value Date    GLUCOSE 105 (H) 12/24/2022    CALCIUM 10.3 12/24/2022     12/24/2022    K 4.3 12/24/2022    CO2 24.0 12/24/2022     12/24/2022    BUN 19 12/24/2022    CREATININE 1.60 (H) 12/24/2022    EGFRIFNONA 54 (L) 09/24/2021    BCR 11.9 12/24/2022    ANIONGAP 11.0 12/24/2022     Lab Results   Component Value Date    WBC 20.76 (H) 12/24/2022    HGB 13.2 12/24/2022    HCT 41.0 12/24/2022    MCV 88.0 12/24/2022     12/24/2022     Brief Urine Lab Results  (Last result in the past 365 days)      Color   Clarity   Blood   Leuk Est   Nitrite   Protein   CREAT   Urine HCG        01/04/23 1138               Negative                XR Foot 3+ View Right    Result Date: 12/14/2022  PROCEDURE: XR FOOT 3+ VW RIGHT-  THREE VIEW  HISTORY: pain, reported needle in foot  FINDINGS:  Three views show no evidence of an acute, displaced fracture or dislocation of the visualized bony architecture.  The joint spaces appear normal.  A needle is noted in the medial plantar soft tissues of the first digit at the level of the distal phalanx. The needle is approximately 9 mm in length, approximately 2-3 mm deep to the skin surface.      Foreign body first digit as above    This report was signed and finalized on 12/14/2022 12:31 PM by Vipul Rose MD.    CT Abdomen Pelvis Stone Protocol    Result Date: 12/21/2022  CT ABDOMEN AND PELVIS STONE PROTOCOL  HISTORY: Flank pain, kidney stone suspected; R31.0-Gross hematuria.  COMPARISON:None  TECHNIQUE: Noncontrast exam.  CT ABDOMEN:  Dominant left mid renal stone is seen within an anterior calyx measuring 16 x 9 x 5 mm. Moderate right hydronephrosis and hydroureter is noted secondary to a mid-distal right ureteral stone measuring 4 x 6 mm. Remaining solid organs are unremarkable.  Small bowel is fluid-filled probably due to ileus. Patient is status post cholecystectomy.  CT PELVIS: No bowel dilatation is seen. There is no free  fluid. Appendix is normal. Uterine fibroids are present. Ovaries are unremarkable.      1. Moderate right hydronephrosis and hydroureter secondary to a mid-distal right ureteral stone measuring 4 x 6 mm. 2. Dominant nonobstructing left renal stone.  This study was performed with techniques to keep radiation doses as low as reasonably achievable (ALARA). Individualized dose reduction techniques using automated exposure control or adjustment of vA and/or kV according to the patient size were employed.  This report was signed and finalized on 12/21/2022 1:51 PM by Vipul Rose MD.    XR toe(s) 2 views min right    Result Date: 12/10/2022  RIGHT TOES SERIES INDICATION:  Foreign body COMPARISON:  None. FINDINGS: 3 views of the right toe  were obtained.  There is no acute osseous abnormality of the right toes. There is mild degenerative joint disease, most pronounced at the first MTP joint.  There is a metallic foreign body in the medial soft tissues of the right great toe at the level of the distal phalanx. This measures 8 mm.    8 mm foreign body within the medial soft tissues of the distal aspect of the right great toe. Images reviewed, interpreted, and dictated by Sarai Mcrae MD          Assessment  Ashleigh Hill is a 52 y.o. female who presents with the following diagnosis:  1. Kidney stone         Plan  1. To OR for RIGHT URETEROSCOPY LASER LITHOTRIPSY WITH STENT INSERTION     Curry Gonzalez MD

## 2023-01-05 NOTE — ANESTHESIA PREPROCEDURE EVALUATION
Anesthesia Evaluation     Patient summary reviewed and Nursing notes reviewed   no history of anesthetic complications:  NPO Solid Status: > 8 hours  NPO Liquid Status: > 8 hours           Airway   Mallampati: II  TM distance: >3 FB  Neck ROM: full  Possible difficult intubation  Dental - normal exam     Pulmonary - normal exam   (+) a smoker Former, recent URI, sleep apnea,   Cardiovascular - normal exam  Exercise tolerance: good (4-7 METS)    (+) hyperlipidemia,       Neuro/Psych  (+) headaches, psychiatric history Anxiety, Depression and Bipolar,    GI/Hepatic/Renal/Endo    (+) obesity,  hiatal hernia, GERD, GI bleeding , renal disease stones, thyroid problem thyroid nodules    Musculoskeletal     (+) chronic pain, neck pain,   Abdominal   (+) obese,    Substance History   (+) alcohol use,   (-) drug use     OB/GYN          Other        ROS/Med Hx Other: Porphyria                   Anesthesia Plan    ASA 3     general     (Risks and benefits discussed including risk of aspiration, recall and dental damage. All patient questions answered.    Will continue with plan of care.)  intravenous induction     Anesthetic plan, risks, benefits, and alternatives have been provided, discussed and informed consent has been obtained with: patient.  Pre-procedure education provided      CODE STATUS:

## 2023-01-05 NOTE — ANESTHESIA POSTPROCEDURE EVALUATION
Patient: Ashleigh Hill    Procedure Summary     Date: 01/05/23 Room / Location: [facility] /  [facility]    Anesthesia Start: 1219 Anesthesia Stop: 1252    Procedure: CYSTOSCOPY RETROGRADE PYELOGRAM AND URETEROSCOPY LASER LITHOTRIPSY WITH RIGHT STENT INSERTION (Right) Diagnosis:       Kidney stone      (Kidney stone [N20.0])    Surgeons: Curry Gonzalez MD Provider: Austin Khan CRNA    Anesthesia Type: general ASA Status: 3          Anesthesia Type: general    Vitals  Vitals Value Taken Time   /95 01/05/23 1330   Temp 97.2 °F (36.2 °C) 01/05/23 1252   Pulse 92 01/05/23 1348   Resp 18 01/05/23 1330   SpO2 94 % 01/05/23 1348   Vitals shown include unvalidated device data.        Post Anesthesia Care and Evaluation    Patient location during evaluation: PACU  Patient participation: complete - patient participated  Level of consciousness: awake  Pain score: 3  Pain management: adequate    Airway patency: patent  Anesthetic complications: No anesthetic complications  PONV Status: controlled  Cardiovascular status: acceptable and stable  Respiratory status: acceptable and face mask  Hydration status: acceptable

## 2023-01-11 ENCOUNTER — TELEPHONE (OUTPATIENT)
Dept: UROLOGY | Facility: CLINIC | Age: 53
End: 2023-01-11

## 2023-01-11 ENCOUNTER — PROCEDURE VISIT (OUTPATIENT)
Dept: UROLOGY | Facility: CLINIC | Age: 53
End: 2023-01-11
Payer: MEDICAID

## 2023-01-11 DIAGNOSIS — N20.0 CALCULUS OF KIDNEY: Primary | ICD-10-CM

## 2023-01-11 PROCEDURE — 52310 CYSTOSCOPY AND TREATMENT: CPT | Performed by: UROLOGY

## 2023-01-11 RX ORDER — CYCLOBENZAPRINE HCL 10 MG
10 TABLET ORAL 2 TIMES DAILY PRN
Qty: 2 TABLET | Refills: 0 | Status: SHIPPED | OUTPATIENT
Start: 2023-01-11

## 2023-01-11 NOTE — TELEPHONE ENCOUNTER
Spoke with Dr Gonzalez and he states that she is having spasms and the patient has flexeril and to call and see if she still has any that it would help her. I called patient and she did still have some so I told her to take it and to call us back tomorrow if symptoms didn't get better.

## 2023-01-11 NOTE — PROGRESS NOTES
Preoperative diagnosis  Foreign body in genitourinary tract    Postoperative diagnosis  Foreign body in genitourinary tract    Procedure  Flexible cystourethroscopy with stent removal    Attending surgeon  Curry Gonzalez MD    Anesthesia  2% lidocaine jelly intraurethrally    Complications  None    Indications  52 y.o. female who is status post ureteroscopy with laser lithotripsy who presents for stent removal.    Procedure  Detailed information of all possible complications and side effects were discussed with the patient.  Informed consent was obtained. Patient was given one dose of antibiotics. The patient was placed in supine position and a timeout was performed. The patient was prepped and draped in sterile fashion.  Next, 2% lidocaine jelly was bluntly injected per urethra without difficulty. The 14 [place] flexible cystoscope was passed through the urethra and into the bladder.  The stent was visualized, grasped and removed in its entirety.  The patient tolerated the procedure well.    Plan  1. Provided education regarding water intake of at least 2 liters per day  2. F/u in 8 weeks with a renal ultrasound with [facility]

## 2023-01-12 ENCOUNTER — OFFICE VISIT (OUTPATIENT)
Dept: GASTROENTEROLOGY | Facility: CLINIC | Age: 53
End: 2023-01-12
Payer: MEDICAID

## 2023-01-12 VITALS
SYSTOLIC BLOOD PRESSURE: 114 MMHG | DIASTOLIC BLOOD PRESSURE: 68 MMHG | OXYGEN SATURATION: 97 % | HEART RATE: 113 BPM | BODY MASS INDEX: 34.31 KG/M2 | WEIGHT: 201 LBS | HEIGHT: 64 IN

## 2023-01-12 DIAGNOSIS — K21.9 GASTROESOPHAGEAL REFLUX DISEASE WITHOUT ESOPHAGITIS: Primary | ICD-10-CM

## 2023-01-12 DIAGNOSIS — K31.9 GASTROPATHY: ICD-10-CM

## 2023-01-12 DIAGNOSIS — K59.04 CHRONIC IDIOPATHIC CONSTIPATION: ICD-10-CM

## 2023-01-12 DIAGNOSIS — D12.6 TUBULAR ADENOMA OF COLON: ICD-10-CM

## 2023-01-12 DIAGNOSIS — K57.30 DIVERTICULOSIS, SIGMOID: ICD-10-CM

## 2023-01-12 PROCEDURE — 99214 OFFICE O/P EST MOD 30 MIN: CPT | Performed by: PHYSICIAN ASSISTANT

## 2023-01-12 RX ORDER — DOCUSATE SODIUM 100 MG/1
100 CAPSULE, LIQUID FILLED ORAL 2 TIMES DAILY PRN
Qty: 180 CAPSULE | Refills: 0 | Status: SHIPPED | OUTPATIENT
Start: 2023-01-12

## 2023-01-12 NOTE — PROGRESS NOTES
Follow Up Note     Date: 2023   Patient Name: Ashleigh Hill  MRN: [id number]  : 1970     Primary Care Provider: Livan Boston MD     Chief Complaint   Patient presents with   • Results     Colonoscopy and Upper GI Endoscopy      History of present illness:   2023  Ashleigh Hill is a 52 y.o. female who is here today for follow up regarding Results (Colonoscopy and Upper GI Endoscopy).    Since last visit, she is no longer having any vomiting episodes. Has not had any nausea at all until she had kidney stones but that has resolved now. She is feeling much better than previous. Had EGD and colonoscopy since last visit and would like to discuss those results.     She has been taking Linzess 72 mcg once daily plus colace and this has been allowing her to have stools daily. She feels much better now that constipation is better controlled.     Interval History:  2022  She has been having rectal bleeding for the past 3-4 months, it is bright red in color. Blood is in the stool, on the tissue and in the toilet water. Bleeding only occurs with a BM. She has been told in the past that she has hemorrhodis. She has had a hemorrhoid surgery in the past approx 5 years. She has constipation which aggravates the bleeding. BMs are occurring every 2-3 days and hard with straining. Has tried taking dulcolax, miralax without relief. Also has associated bloating.      Having nausea with vomiting 2-3 times per week. This started approx 3 weeks ago. This is occurring mostly in the mornings, not after eating. She tried taking her medications with food as a way to help with this but she has had vomiting even before taking her medications. Has increased belching. No heartburn. No current reflux medications. She does wake up at night with sensation that something is caught in her throat. No dysphagia. Had EGD several years ago, was told she had a small hiatal hernia, completed by Dr. Landry.  "     Last colonoscopy was 7-8 years ago and reports having over 20 colon polyps removed then. There is no known family history of colon cancer. Mother had colon polyps.     Subjective     Past Medical History:   Diagnosis Date   • Abdominal pain    • Abnormal weight gain    • Anxiety    • Bipolar affective disorder (HCC)    • Cholelithiasis     removed   • Colon polyp 2015    had over 20 polyps   • Constipation    • Depression    • Elevated cholesterol    • Elevated white blood cell count    • Excessive sweating    • Hematuria    • Hemorrhoids    • Hiatal hernia    • History of psoriasis    • History of thyroid nodule    • Hyperlipidemia    • Kidney stone    • Leukocytosis    • Lumbago    • Migraines    • Nephrolithiasis     left   • Ovarian cyst     left   • Plantar fasciitis    • Porphyria (HCC)    • Prediabetes    • Sleep apnea    • Snores    • Tobacco use    • Toe infection    • Upper respiratory infection    • Uses contact lenses    • Vaginitis    • Visit for screening mammogram      Past Surgical History:   Procedure Laterality Date   • ABDOMINOPLASTY     • BREAST RECONSTRUCTION Bilateral 2010    Breast Lift   •  SECTION      x2   • CHOLECYSTECTOMY     • COLONOSCOPY     • COLONOSCOPY N/A 10/26/2022    Procedure: COLONOSCOPY CPT CODE: 51293;  Surgeon: Lane Velazco MD;  Location: [facility] ENDOSCOPY;  Service: Gastroenterology;  Laterality: N/A;   • ENDOMETRIAL ABLATION     • ENDOSCOPY N/A 10/26/2022    Procedure: ESOPHAGOGASTRODUODENOSCOPY WITH BIOPSY CPT CODE: 84022;  Surgeon: Lane Velazco MD;  Location: [facility] ENDOSCOPY;  Service: Gastroenterology;  Laterality: N/A;   • HEMORRHOIDECTOMY  2018    had 3 removed   • KIDNEY STONE SURGERY     • TOE SURGERY      \"needle removed from toe\"   • TONSILLECTOMY     • TUBAL ABDOMINAL LIGATION     • URETEROSCOPY LASER LITHOTRIPSY WITH STENT INSERTION Right 2023    Procedure: CYSTOSCOPY RETROGRADE PYELOGRAM AND " URETEROSCOPY LASER LITHOTRIPSY WITH RIGHT STENT INSERTION;  Surgeon: Curry oGnzalez MD;  Location: [facility];  Service: Urology;  Laterality: Right;     Family History   Problem Relation Age of Onset   • Arthritis Mother    • Diabetes Mother    • Hypertension Mother    • Osteoporosis Mother    • Thyroid disease Mother    • Hyperlipidemia Mother    • Colon polyps Mother    • Cancer Father    • Kidney disease Father    • Breast cancer Maternal Aunt    • Colon cancer Neg Hx    • Ovarian cancer Neg Hx      Social History     Socioeconomic History   • Marital status:    Tobacco Use   • Smoking status: Former     Packs/day: 1.00     Years: 30.00     Pack years: 30.00     Types: Cigarettes     Start date: 8/1/1985     Quit date: 11/1/2019     Years since quitting: 3.2   • Smokeless tobacco: Never   Vaping Use   • Vaping Use: Never used   Substance and Sexual Activity   • Alcohol use: Yes     Comment: rarely 2 times per year   • Drug use: Never   • Sexual activity: Defer       Current Outpatient Medications:   •  buPROPion XL (WELLBUTRIN XL) 300 MG 24 hr tablet, Take 300 mg by mouth Daily., Disp: , Rfl:   •  cetirizine (zyrTEC) 10 MG tablet, , Disp: , Rfl:   •  Cholecalciferol (Vitamin D) 50 MCG (2000 UT) tablet, 1 tablet Daily., Disp: , Rfl:   •  cyclobenzaprine (FLEXERIL) 10 MG tablet, Take 1 tablet by mouth 2 (Two) Times a Day As Needed for Muscle Spasms., Disp: 2 tablet, Rfl: 0  •  docusate sodium (Colace) 100 MG capsule, Take 1 capsule by mouth 2 (Two) Times a Day. If taking oxycodone, Disp: 15 capsule, Rfl: 1  •  ezetimibe (ZETIA) 10 MG tablet, Take 10 mg by mouth Daily., Disp: , Rfl:   •  FLUoxetine (PROzac) 20 MG capsule, Take 3 capsules daily (Patient taking differently: Take  by mouth Daily. Take 3 capsules daily), Disp: 270 capsule, Rfl: 2  •  furosemide (LASIX) 20 MG tablet, Take 1 tablet by mouth Daily As Needed. PRN, Disp: , Rfl:   •  Hydrocortisone, Perianal, (Anusol-HC) 2.5 % rectal cream,  Insert  into the rectum 2 (Two) Times a Day., Disp: 28 g, Rfl: 1  •  linaclotide (Linzess) 72 MCG capsule capsule, Take 1 capsule by mouth Every Morning Before Breakfast. Wait 30 mins before eating., Disp: 30 capsule, Rfl: 5  •  losartan (COZAAR) 50 MG tablet, Take 50 mg by mouth Daily., Disp: , Rfl:   •  metFORMIN ER (GLUCOPHAGE-XR) 500 MG 24 hr tablet, Take 500 mg by mouth Daily With Breakfast., Disp: , Rfl:   •  Misc. Devices (SITZ BATH) misc, Use with warm water 2-3 times daily for 15-20 minutes each, Disp: 1 each, Rfl: 0  •  NON FORMULARY, Take 1 dose by mouth Daily. Xylo-unknown dose, Disp: , Rfl:   •  ondansetron ODT (ZOFRAN-ODT) 4 MG disintegrating tablet, Place 1 tablet on the tongue Every 8 (Eight) Hours As Needed for Nausea., Disp: 12 tablet, Rfl: 0  •  oxybutynin XL (Ditropan XL) 10 MG 24 hr tablet, Take 1 tablet by mouth Daily As Needed (bladder spasm)., Disp: 10 tablet, Rfl: 0  •  pantoprazole (PROTONIX) 40 MG EC tablet, TAKE ONE TABLET BY MOUTH DAILY 30 MINUTES BEFORE BREAKFAST, Disp: 30 tablet, Rfl: 3  •  Semaglutide (OZEMPIC, 2 MG/DOSE, SC), Inject  under the skin into the appropriate area as directed 1 (One) Time Per Week., Disp: , Rfl:   •  tamsulosin (FLOMAX) 0.4 MG capsule 24 hr capsule, Take 1 capsule by mouth Every Night., Disp: 10 capsule, Rfl: 0  •  topiramate (TOPAMAX) 25 MG tablet, , Disp: , Rfl:   •  traZODone (DESYREL) 50 MG tablet, Take 1 tablet by mouth At Night As Needed., Disp: , Rfl:   •  Cimzia Prefilled 2 X 200 MG/ML Prefilled Syringe Kit, Every 2 weeks, Disp: , Rfl:   •  Mounjaro 2.5 MG/0.5ML solution pen-injector, , Disp: , Rfl:   •  ondansetron ODT (ZOFRAN-ODT) 4 MG disintegrating tablet, Take 4 mg by mouth., Disp: , Rfl:   •  oxyCODONE (Roxicodone) 5 MG immediate release tablet, Take 1 tablet by mouth Every 6 (Six) Hours As Needed for Moderate Pain or Severe Pain., Disp: 5 tablet, Rfl: 0  •  Ozempic, 2 MG/DOSE, 8 MG/3ML solution pen-injector, , Disp: , Rfl:   •   phenazopyridine (PYRIDIUM) 100 MG tablet, Take 1 tablet by mouth 3 (Three) Times a Day As Needed (urinary burning)., Disp: 21 tablet, Rfl: 0  •  phentermine (ADIPEX-P) 37.5 MG tablet, Take 37.5 mg by mouth Every Morning Before Breakfast., Disp: , Rfl:   •  sulfamethoxazole-trimethoprim (Bactrim DS) 800-160 MG per tablet, Take 1 tablet by mouth 2 (Two) Times a Day., Disp: 6 tablet, Rfl: 0    Allergies   Allergen Reactions   • Bee Venom Anaphylaxis   • Codeine Hives     The following portions of the patient's history were reviewed and updated as appropriate: allergies, current medications, past family history, past medical history, past social history, past surgical history and problem list.    Objective     Physical Exam  Constitutional:       General: She is not in acute distress.     Appearance: Normal appearance. She is well-developed. She is obese. She is not diaphoretic.   HENT:      Head: Normocephalic and atraumatic.      Right Ear: External ear normal.      Left Ear: External ear normal.      Nose: Nose normal.      Mouth/Throat:      Comments: Wearing a mask  Eyes:      General: No scleral icterus.        Right eye: No discharge.         Left eye: No discharge.      Conjunctiva/sclera: Conjunctivae normal.   Neck:      Trachea: No tracheal deviation.   Pulmonary:      Effort: Pulmonary effort is normal. No respiratory distress.   Musculoskeletal:         General: Normal range of motion.      Cervical back: Normal range of motion.   Skin:     Coloration: Skin is not pale.      Findings: No erythema or rash.   Neurological:      Mental Status: She is alert and oriented to person, place, and time.      Coordination: Coordination normal.   Psychiatric:         Mood and Affect: Mood normal.         Behavior: Behavior normal.         Thought Content: Thought content normal.         Judgment: Judgment normal.       Vitals:    01/12/23 1132   BP: 114/68   Pulse: 113   SpO2: 97%   Weight: 91.2 kg (201 lb)   Height:  "162.6 cm (64\")     Results Review:   I reviewed the patient's new clinical results.    Admission on 12/24/2022, Discharged on 12/24/2022   Component Date Value Ref Range Status   • Glucose 12/24/2022 105 (H)  65 - 99 mg/dL Final   • BUN 12/24/2022 19  6 - 20 mg/dL Final   • Creatinine 12/24/2022 1.60 (H)  0.57 - 1.00 mg/dL Final   • Sodium 12/24/2022 140  136 - 145 mmol/L Final   • Potassium 12/24/2022 4.3  3.5 - 5.2 mmol/L Final   • Chloride 12/24/2022 105  98 - 107 mmol/L Final   • CO2 12/24/2022 24.0  22.0 - 29.0 mmol/L Final   • Calcium 12/24/2022 10.3  8.6 - 10.5 mg/dL Final   • Total Protein 12/24/2022 7.6  6.0 - 8.5 g/dL Final   • Albumin 12/24/2022 4.50  3.50 - 5.20 g/dL Final   • ALT (SGPT) 12/24/2022 18  1 - 33 U/L Final   • AST (SGOT) 12/24/2022 20  1 - 32 U/L Final   • Alkaline Phosphatase 12/24/2022 101  39 - 117 U/L Final   • Total Bilirubin 12/24/2022 <0.2  0.0 - 1.2 mg/dL Final   • Globulin 12/24/2022 3.1  gm/dL Final   • A/G Ratio 12/24/2022 1.5  g/dL Final   • BUN/Creatinine Ratio 12/24/2022 11.9  7.0 - 25.0 Final   • Anion Gap 12/24/2022 11.0  5.0 - 15.0 mmol/L Final   • eGFR 12/24/2022 38.4 (L)  >60.0 mL/min/1.73 Final    National Kidney Foundation and American Society of Nephrology (ASN) Task Force recommended calculation based on the Chronic Kidney Disease Epidemiology Collaboration (CKD-EPI) equation refit without adjustment for race.   • WBC 12/24/2022 20.76 (H)  3.40 - 10.80 10*3/mm3 Final   • RBC 12/24/2022 4.66  3.77 - 5.28 10*6/mm3 Final   • Hemoglobin 12/24/2022 13.2  12.0 - 15.9 g/dL Final   • Hematocrit 12/24/2022 41.0  34.0 - 46.6 % Final   • MCV 12/24/2022 88.0  79.0 - 97.0 fL Final   • MCH 12/24/2022 28.3  26.6 - 33.0 pg Final   • MCHC 12/24/2022 32.2  31.5 - 35.7 g/dL Final   • RDW 12/24/2022 13.8  12.3 - 15.4 % Final   • RDW-SD 12/24/2022 44.0  37.0 - 54.0 fl Final   • MPV 12/24/2022 8.9  6.0 - 12.0 fL Final   • Platelets 12/24/2022 432  140 - 450 10*3/mm3 Final      CT Abdomen " Pelvis Stone Protocol  Result Date: 12/21/2022  1. Moderate right hydronephrosis and hydroureter secondary to a mid-distal right ureteral stone measuring 4 x 6 mm. 2. Dominant nonobstructing left renal stone.     EGD and colonoscopy by Dr. Velazco 10/26/2022:  - The oropharynx was normal.  - The Z-line was regular and was found 35 cm from the incisors.  - No gross lesions were noted in the proximal esophagus. Biopsies were taken with a cold forceps for histology.  - Bilious fluid was found in the stomach.  - Striped moderately erythematous mucosa without bleeding was found on the posterior wall of the stomach, in the gastric antrum and in the prepyloric region of the stomach. Biopsies were taken with a cold forceps for Helicobacter pylori testing.  - The duodenal bulb, first portion of the duodenum, second portion of the duodenum and third portion of the duodenum were normal. Biopsies for histology were taken with a cold forceps for evaluation of celiac disease.  - Hemorrhoids were found on perianal exam.  - A 3 mm polyp was found in the cecum. The polyp was sessile. The polyp was removed with a cold snare. Resection and retrieval were complete.  - A 4 mm polyp was found in the hepatic flexure. The polyp was sessile. The polyp was removed with a cold snare. Resection and retrieval were complete.  - A 3 mm polyp was found in the recto-sigmoid colon. The polyp was sessile. The polyp was removed with a cold snare. Resection and retrieval were complete.  - A few small-mouthed diverticula were found in the sigmoid colon.  - Non-bleeding external and internal hemorrhoids were found. The hemorrhoids were moderate and medium-sized.  - The terminal ileum appeared normal. Biopsies were taken with a cold forceps for histology for change in bowel habit.  - Biopsies were taken with a cold forceps in the sigmoid colon, in the descending colon, in the transverse colon, in the ascending colon and in the cecum for  histology.  Pathology DIAGNOSIS:   A.   SMALL BOWEL, BIOPSIES:   Benign duodenal mucosa with no diagnostic abnormality   B.   GASTRIC ANTRUM BIOPSIES:   Reactive gastropathy   C.   DISTAL ESOPHAGUS, BIOPSIES:   Mildly reactive squamous mucosa   D.   ILEUM, RANDOM BIOPSY:   Benign ileal mucosa with no diagnostic abnormality   E.   CECUM, POLYP, BIOPSIES:   Benign colonic mucosa with no diagnostic abnormality   F.   TRANSVERSE COLON, RANDOM BIOPSIES:   Benign colonic mucosa with no diagnostic abnormality   G.   COLON, HEPATIC FLEXURE, POLYP, BIOPSIES:   Tubular adenoma   H.   RECTOSIGMOID POLYP, BIOPSIES:   Tubular adenoma     Assessment / Plan      1. Gastroesophageal reflux disease without esophagitis  2. Gastropathy  No longer having severe symptoms, was previously having nausea with vomiting 2-3 times per week. This was occurring mostly in the mornings, not after eating. EGD 10/2022 showed bilious fluid in the stomach, striped moderately erythematous mucosa in the stomach. Pathology showed reactive gastropathy, no H pylori.      Can take zofran PRN nausea  Continue Protonix 40 mg once daily  Acid reflux measures discussed  Avoid NSAIDs    3. Chronic idiopathic constipation  BMs were previously occurring every 2-3 days and hard with straining. Has tried taking dulcolax, miralax without relief. Also has associated bloating. She is now taking colace plus linzess 72 mcg once daily with daily stools and great improvement in all symptoms.      Continue Linzess 72 mcg once daily for constipation  May continue colace PRN constipation    - linaclotide (Linzess) 72 MCG capsule capsule; Take 1 capsule by mouth Every Morning Before Breakfast. Wait 30 mins before eating.  Dispense: 90 capsule; Refill: 3  - docusate sodium (Colace) 100 MG capsule; Take 1 capsule by mouth 2 (Two) Times a Day As Needed for Constipation.  Dispense: 180 capsule; Refill: 0    4. Tubular adenoma of colon  Colonoscopy  showed 3 colon polyps  which were small in size, all were tubular adenomas without high-grade dysplasia. She will need a repeat colonoscopy in 5 years, due 2027. We will place her on the recall list to be called to schedule an appointment closer to that date. She was instructed to call the office if no reminder call is made within the proper number of years. Also, the indications for a repeat colonoscopy sooner than the recall date were discussed; rectal bleeding, melena, change in bowel habits or significant abdominal pain.    Previous colonoscopy was 7-8 years ago and reports having over 20 colon polyps removed then. There is no known family history of colon cancer. Mother had colon polyps.     5. Diverticulosis, sigmoid  She has diverticulosis of the colon based on recent colonoscopy results. She is not able to consume a high-fiber diet due to bilious fluid in the stomach.  She may take Metamucil for additional fiber.           Follow Up:   Return in about 1 year (around 1/12/2024) for recheck constipation.      Polly Pyle PA-C  [facility]  1/12/2023  16:22 EST    Dictated Utilizing Dragon Dictation: Part of this note may be an electronic transcription/translation of spoken language to printed text using the Dragon Dictation System.

## 2023-02-13 ENCOUNTER — HOSPITAL ENCOUNTER (OUTPATIENT)
Dept: ULTRASOUND IMAGING | Facility: HOSPITAL | Age: 53
Discharge: HOME | End: 2023-02-13
Admitting: UROLOGY
Payer: MEDICAID

## 2023-02-13 DIAGNOSIS — N20.0 CALCULUS OF KIDNEY: ICD-10-CM

## 2023-02-13 PROCEDURE — 76775 US EXAM ABDO BACK WALL LIM: CPT

## 2023-03-22 ENCOUNTER — OFFICE VISIT (OUTPATIENT)
Dept: UROLOGY | Facility: CLINIC | Age: 53
End: 2023-03-22
Payer: MEDICAID

## 2023-03-22 VITALS
OXYGEN SATURATION: 95 % | SYSTOLIC BLOOD PRESSURE: 124 MMHG | HEIGHT: 64 IN | BODY MASS INDEX: 34.33 KG/M2 | WEIGHT: 201.06 LBS | TEMPERATURE: 96.2 F | DIASTOLIC BLOOD PRESSURE: 82 MMHG | HEART RATE: 108 BPM

## 2023-03-22 DIAGNOSIS — N20.0 KIDNEY STONE: Primary | ICD-10-CM

## 2023-03-22 LAB
BILIRUB BLD-MCNC: NEGATIVE MG/DL
CLARITY, POC: CLEAR
COLOR UR: YELLOW
EXPIRATION DATE: ABNORMAL
GLUCOSE UR STRIP-MCNC: NEGATIVE MG/DL
KETONES UR QL: NEGATIVE
LEUKOCYTE EST, POC: NEGATIVE
Lab: ABNORMAL
NITRITE UR-MCNC: NEGATIVE MG/ML
PH UR: 7 [PH] (ref 5–8)
PROT UR STRIP-MCNC: ABNORMAL MG/DL
RBC # UR STRIP: ABNORMAL /UL
SP GR UR: 1.01 (ref 1–1.03)
UROBILINOGEN UR QL: NORMAL

## 2023-03-22 PROCEDURE — 1159F MED LIST DOCD IN RCRD: CPT | Performed by: PHYSICIAN ASSISTANT

## 2023-03-22 PROCEDURE — 99213 OFFICE O/P EST LOW 20 MIN: CPT | Performed by: PHYSICIAN ASSISTANT

## 2023-03-22 PROCEDURE — 1160F RVW MEDS BY RX/DR IN RCRD: CPT | Performed by: PHYSICIAN ASSISTANT

## 2023-03-22 RX ORDER — IXEKIZUMAB 80 MG/ML
INJECTION, SOLUTION SUBCUTANEOUS
COMMUNITY
Start: 2023-03-16

## 2023-03-22 RX ORDER — CARIPRAZINE 1.5 MG/1
CAPSULE, GELATIN COATED ORAL
COMMUNITY
Start: 2023-01-17

## 2023-03-22 RX ORDER — TIRZEPATIDE 7.5 MG/.5ML
INJECTION, SOLUTION SUBCUTANEOUS
COMMUNITY
Start: 2023-03-07

## 2023-03-22 RX ORDER — SIMETHICONE 80 MG
TABLET,CHEWABLE ORAL
COMMUNITY
Start: 2023-03-16

## 2023-03-22 RX ORDER — LORATADINE 10 MG/1
TABLET ORAL
COMMUNITY
Start: 2023-03-10

## 2023-03-22 RX ORDER — FLUOXETINE HYDROCHLORIDE 40 MG/1
CAPSULE ORAL
COMMUNITY
Start: 2023-02-18

## 2023-03-22 RX ORDER — METOCLOPRAMIDE 5 MG/1
TABLET ORAL
COMMUNITY
Start: 2023-03-16

## 2023-03-22 RX ORDER — METOPROLOL SUCCINATE 25 MG/1
TABLET, EXTENDED RELEASE ORAL
COMMUNITY
Start: 2023-01-30

## 2023-03-22 NOTE — PROGRESS NOTES
"Chief Complaint   Patient presents with   • Follow-up   • kidney Stone     Follow-up kidney stone        HPI  Ms. Hill is a 53 y.o. female with history of nephrolithiasis s/p right URS/LL who presents for follow up.     At this visit, is feeling well. Denies flank pain or dysuria.     Past Medical History:   Diagnosis Date   • Abdominal pain    • Abnormal weight gain    • Anxiety    • Bipolar affective disorder (HCC)    • Cholelithiasis 2011    removed   • Colon polyp 2015    had over 20 polyps   • Constipation    • Depression    • Elevated cholesterol    • Elevated white blood cell count    • Excessive sweating    • Hematuria    • Hemorrhoids    • Hiatal hernia    • History of psoriasis    • History of thyroid nodule    • Hyperlipidemia    • Kidney stone    • Leukocytosis    • Lumbago    • Migraines    • Nephrolithiasis     left   • Ovarian cyst     left   • Plantar fasciitis    • Porphyria (HCC)    • Prediabetes    • Sleep apnea    • Snores    • Tobacco use    • Toe infection    • Upper respiratory infection    • Urinary incontinence 2021   • Uses contact lenses    • Vaginitis    • Visit for screening mammogram        Past Surgical History:   Procedure Laterality Date   • ABDOMINOPLASTY     • BREAST RECONSTRUCTION Bilateral 2010    Breast Lift   •  SECTION      x2   • CHOLECYSTECTOMY     • COLONOSCOPY     • COLONOSCOPY N/A 10/26/2022    Procedure: COLONOSCOPY CPT CODE: 06064;  Surgeon: Lane Velazco MD;  Location: [facility] ENDOSCOPY;  Service: Gastroenterology;  Laterality: N/A;   • ENDOMETRIAL ABLATION     • ENDOSCOPY N/A 10/26/2022    Procedure: ESOPHAGOGASTRODUODENOSCOPY WITH BIOPSY CPT CODE: 44340;  Surgeon: Lane Velazco MD;  Location: [facility] ENDOSCOPY;  Service: Gastroenterology;  Laterality: N/A;   • HEMORRHOIDECTOMY      had 3 removed   • KIDNEY STONE SURGERY     • LITHOTRIPSY  2018   • TOE SURGERY      \"needle removed from toe\"   • TONSILLECTOMY     • " TUBAL ABDOMINAL LIGATION  1993   • URETEROSCOPY LASER LITHOTRIPSY WITH STENT INSERTION Right 01/05/2023    Procedure: CYSTOSCOPY RETROGRADE PYELOGRAM AND URETEROSCOPY LASER LITHOTRIPSY WITH RIGHT STENT INSERTION;  Surgeon: Curry Gonzalez MD;  Location: [facility];  Service: Urology;  Laterality: Right;         Current Outpatient Medications:   •  buPROPion XL (WELLBUTRIN XL) 300 MG 24 hr tablet, Take 1 tablet by mouth Daily., Disp: , Rfl:   •  cetirizine (zyrTEC) 10 MG tablet, , Disp: , Rfl:   •  cyclobenzaprine (FLEXERIL) 10 MG tablet, Take 1 tablet by mouth 2 (Two) Times a Day As Needed for Muscle Spasms., Disp: 2 tablet, Rfl: 0  •  ezetimibe (ZETIA) 10 MG tablet, Take 1 tablet by mouth Daily., Disp: , Rfl:   •  FLUoxetine (PROzac) 40 MG capsule, , Disp: , Rfl:   •  furosemide (LASIX) 20 MG tablet, Take 1 tablet by mouth Daily As Needed. PRN, Disp: , Rfl:   •  Hydrocortisone, Perianal, (Anusol-HC) 2.5 % rectal cream, Insert  into the rectum 2 (Two) Times a Day., Disp: 28 g, Rfl: 1  •  linaclotide (Linzess) 72 MCG capsule capsule, Take 1 capsule by mouth Every Morning Before Breakfast. Wait 30 mins before eating., Disp: 90 capsule, Rfl: 3  •  losartan (COZAAR) 50 MG tablet, Take 1 tablet by mouth Daily., Disp: , Rfl:   •  metFORMIN ER (GLUCOPHAGE-XR) 500 MG 24 hr tablet, Take 1 tablet by mouth Daily With Breakfast., Disp: , Rfl:   •  metoclopramide (REGLAN) 5 MG tablet, , Disp: , Rfl:   •  Mounjaro 7.5 MG/0.5ML solution pen-injector, , Disp: , Rfl:   •  ondansetron ODT (ZOFRAN-ODT) 4 MG disintegrating tablet, Place 1 tablet on the tongue Every 8 (Eight) Hours As Needed for Nausea., Disp: 12 tablet, Rfl: 0  •  pantoprazole (PROTONIX) 40 MG EC tablet, TAKE ONE TABLET BY MOUTH DAILY 30 MINUTES BEFORE BREAKFAST, Disp: 30 tablet, Rfl: 3  •  Semaglutide (OZEMPIC, 2 MG/DOSE, SC), Inject  under the skin into the appropriate area as directed 1 (One) Time Per Week., Disp: , Rfl:   •  simethicone (MYLICON) 80 MG  "chewable tablet, , Disp: , Rfl:   •  Taltz 80 MG/ML solution auto-injector, , Disp: , Rfl:   •  topiramate (TOPAMAX) 25 MG tablet, , Disp: , Rfl:   •  Vraylar 1.5 MG capsule capsule, , Disp: , Rfl:   •  Cholecalciferol (Vitamin D) 50 MCG (2000 UT) tablet, 1 tablet Daily. (Patient not taking: Reported on 3/22/2023), Disp: , Rfl:   •  docusate sodium (Colace) 100 MG capsule, Take 1 capsule by mouth 2 (Two) Times a Day As Needed for Constipation. (Patient not taking: Reported on 3/22/2023), Disp: 180 capsule, Rfl: 0  •  FLUoxetine (PROzac) 20 MG capsule, Take 3 capsules daily (Patient not taking: Reported on 3/22/2023), Disp: 270 capsule, Rfl: 2  •  loratadine (CLARITIN) 10 MG tablet, , Disp: , Rfl:   •  metoprolol succinate XL (TOPROL-XL) 25 MG 24 hr tablet, , Disp: , Rfl:   •  Misc. Devices (SITZ BATH) misc, Use with warm water 2-3 times daily for 15-20 minutes each (Patient not taking: Reported on 3/22/2023), Disp: 1 each, Rfl: 0  •  NON FORMULARY, Take 1 dose by mouth Daily. Xylo-unknown dose (Patient not taking: Reported on 3/22/2023), Disp: , Rfl:   •  oxybutynin XL (Ditropan XL) 10 MG 24 hr tablet, Take 1 tablet by mouth Daily As Needed (bladder spasm). (Patient not taking: Reported on 3/22/2023), Disp: 10 tablet, Rfl: 0  •  tamsulosin (FLOMAX) 0.4 MG capsule 24 hr capsule, Take 1 capsule by mouth Every Night. (Patient not taking: Reported on 3/22/2023), Disp: 10 capsule, Rfl: 0  •  traZODone (DESYREL) 50 MG tablet, Take 1 tablet by mouth At Night As Needed. (Patient not taking: Reported on 3/22/2023), Disp: , Rfl:      Physical Exam  Visit Vitals  /82 (BP Location: Right arm, Patient Position: Sitting, Cuff Size: Adult)   Pulse 108   Temp 96.2 °F (35.7 °C) (Temporal)   Ht 162.6 cm (64.02\")   Wt 91.2 kg (201 lb 1 oz)   SpO2 95%   BMI 34.49 kg/m²       Labs  Brief Urine Lab Results  (Last result in the past 365 days)      Color   Clarity   Blood   Leuk Est   Nitrite   Protein   CREAT   Urine HCG        " 03/22/23 0929 Yellow   Clear   3+   Negative   Negative   Trace                 Lab Results   Component Value Date    GLUCOSE 105 (H) 12/24/2022    CALCIUM 10.3 12/24/2022     12/24/2022    K 4.3 12/24/2022    CO2 24.0 12/24/2022     12/24/2022    BUN 19 12/24/2022    CREATININE 1.60 (H) 12/24/2022    EGFRIFNONA 54 (L) 09/24/2021    BCR 11.9 12/24/2022    ANIONGAP 11.0 12/24/2022       Lab Results   Component Value Date    WBC 20.76 (H) 12/24/2022    HGB 13.2 12/24/2022    HCT 41.0 12/24/2022    MCV 88.0 12/24/2022     12/24/2022            Radiographic Studies  US Renal Bilateral  Result Date: 2/13/2023  Normal renal ultrasound.   This report was signed and finalized on 2/13/2023 4:12 PM by Tammy Espino MD.    CT Abdomen Pelvis Stone Protocol  Result Date: 12/21/2022  1. Moderate right hydronephrosis and hydroureter secondary to a mid-distal right ureteral stone measuring 4 x 6 mm. 2. Dominant nonobstructing left renal stone.  This study was performed with techniques to keep radiation doses as low as reasonably achievable (ALARA). Individualized dose reduction techniques using automated exposure control or adjustment of vA and/or kV according to the patient size were employed.  This report was signed and finalized on 12/21/2022 1:51 PM by Vipul Rose MD.    I have reviewed the above labs and imaging.       Assessment  53 y.o. female with history of nephrolithiasis s/p right URS/LL for ureteral stone.    She is known to have a large, embedded left midpole stone.  Otherwise, she should currently be stone free.  Her renal ultrasound is normal, without hydronephrosis.  We discussed her risk factors for stone formation and she tells me that she drinks plenty of water daily, minimal coffee.  She does not believe that she has high salt intake.  She is amenable to repeating a Litholink to discuss medical management and stone prevention.    Plan  1.  Obtain Litholink  2.  Follow-up in 6 months with K[facility]  prior  3.  Obtain urine micro to confirm and quantify presence of hematuria found on UA today

## 2023-03-24 ENCOUNTER — TRANSCRIBE ORDERS (OUTPATIENT)
Dept: ADMINISTRATIVE | Facility: HOSPITAL | Age: 53
End: 2023-03-24
Payer: MEDICAID

## 2023-03-24 DIAGNOSIS — Z87.19 HISTORY OF DIVERTICULITIS OF COLON: Primary | ICD-10-CM

## 2023-03-29 ENCOUNTER — TRANSCRIBE ORDERS (OUTPATIENT)
Dept: ADMINISTRATIVE | Facility: HOSPITAL | Age: 53
End: 2023-03-29
Payer: MEDICAID

## 2023-03-29 DIAGNOSIS — Z87.19 HISTORY OF DIVERTICULITIS OF COLON: Primary | ICD-10-CM

## 2023-04-04 DIAGNOSIS — K21.9 GASTROESOPHAGEAL REFLUX DISEASE, UNSPECIFIED WHETHER ESOPHAGITIS PRESENT: ICD-10-CM

## 2023-04-04 RX ORDER — PANTOPRAZOLE SODIUM 40 MG/1
TABLET, DELAYED RELEASE ORAL
Qty: 30 TABLET | Refills: 3 | Status: SHIPPED | OUTPATIENT
Start: 2023-04-04

## 2023-04-13 ENCOUNTER — OFFICE VISIT (OUTPATIENT)
Dept: GASTROENTEROLOGY | Facility: CLINIC | Age: 53
End: 2023-04-13
Payer: MEDICAID

## 2023-04-13 VITALS
HEART RATE: 96 BPM | BODY MASS INDEX: 34.66 KG/M2 | WEIGHT: 202 LBS | SYSTOLIC BLOOD PRESSURE: 102 MMHG | OXYGEN SATURATION: 96 % | DIASTOLIC BLOOD PRESSURE: 66 MMHG

## 2023-04-13 DIAGNOSIS — K21.9 GASTROESOPHAGEAL REFLUX DISEASE WITHOUT ESOPHAGITIS: ICD-10-CM

## 2023-04-13 DIAGNOSIS — R11.0 NAUSEA: ICD-10-CM

## 2023-04-13 DIAGNOSIS — R14.0 BLOATING: ICD-10-CM

## 2023-04-13 DIAGNOSIS — R14.2 BELCHING: Primary | ICD-10-CM

## 2023-04-13 PROCEDURE — 99214 OFFICE O/P EST MOD 30 MIN: CPT | Performed by: PHYSICIAN ASSISTANT

## 2023-04-13 PROCEDURE — 1160F RVW MEDS BY RX/DR IN RCRD: CPT | Performed by: PHYSICIAN ASSISTANT

## 2023-04-13 PROCEDURE — 1159F MED LIST DOCD IN RCRD: CPT | Performed by: PHYSICIAN ASSISTANT

## 2023-04-13 RX ORDER — FAMOTIDINE 40 MG/1
40 TABLET, FILM COATED ORAL NIGHTLY
Qty: 60 TABLET | Refills: 3 | Status: SHIPPED | OUTPATIENT
Start: 2023-04-13

## 2023-04-13 RX ORDER — METOCLOPRAMIDE 5 MG/1
2.5 TABLET ORAL
Qty: 90 TABLET | Refills: 0 | Status: SHIPPED | OUTPATIENT
Start: 2023-04-13 | End: 2023-04-27

## 2023-04-13 NOTE — PROGRESS NOTES
Follow Up Note     Date: 2023   Patient Name: Ashleigh Hill  MRN: [id number]  : 1970     Primary Care Provider: Livan Boston MD     Chief Complaint   Patient presents with   • Gas   • Heartburn     Reflux    • Diarrhea     History of present illness:   2023  Ashleigh Hill is a 53 y.o. female who is here today for follow up regarding Gas, Heartburn (Reflux), and Diarrhea.    Over the past 1.5 months she has noticed progressively worsening bloating, nausea, reflux at night and awakening choking and severe loud malodorous belching. She was getting ozempic since approx 6 months ago then changed to mounjaro recently. Symptoms were present before this change in medication. She has taken zofran with some improvements in nausea. No longer having constipation, was taken off Linzess by her PCP after she developed diarrhea a few weeks ago. She is having about 3 loose stools daily. Taking protonix 40 mg once daily. Taking gas relief with minimal improvements in symptoms. Has not lost any weight with mounjaro.     She saw her PCP recently and had CTAP ordered which is scheduled for 2023.     Interval History:  2022  She has been having rectal bleeding for the past 3-4 months, it is bright red in color. Blood is in the stool, on the tissue and in the toilet water. Bleeding only occurs with a BM. She has been told in the past that she has hemorrhodis. She has had a hemorrhoid surgery in the past approx 5 years. She has constipation which aggravates the bleeding. BMs are occurring every 2-3 days and hard with straining. Has tried taking dulcolax, miralax without relief. Also has associated bloating.      Having nausea with vomiting 2-3 times per week. This started approx 3 weeks ago. This is occurring mostly in the mornings, not after eating. She tried taking her medications with food as a way to help with this but she has had vomiting even before taking her medications. Has  increased belching. No heartburn. No current reflux medications. She does wake up at night with sensation that something is caught in her throat. No dysphagia. Had EGD several years ago, was told she had a small hiatal hernia, completed by Dr. Landry.      Last colonoscopy was 7-8 years ago and reports having over 20 colon polyps removed then. There is no known family history of colon cancer. Mother had colon polyps.     Subjective     Past Medical History:   Diagnosis Date   • Abdominal pain    • Abnormal weight gain    • Anxiety    • Bipolar affective disorder    • Cholelithiasis     removed   • Colon polyp     had over 20 polyps   • Constipation    • Depression    • Elevated cholesterol    • Elevated white blood cell count    • Excessive sweating    • Hematuria    • Hemorrhoids    • Hiatal hernia    • History of psoriasis    • History of thyroid nodule    • Hyperlipidemia    • Kidney stone    • Leukocytosis    • Lumbago    • Migraines    • Nephrolithiasis     left   • Ovarian cyst     left   • Plantar fasciitis    • Porphyria    • Prediabetes    • Sleep apnea    • Snores    • Tobacco use    • Toe infection    • Upper respiratory infection    • Urinary incontinence 2021   • Uses contact lenses    • Vaginitis    • Visit for screening mammogram      Past Surgical History:   Procedure Laterality Date   • ABDOMINOPLASTY     • BREAST RECONSTRUCTION Bilateral 2010    Breast Lift   •  SECTION      x2   • CHOLECYSTECTOMY     • COLONOSCOPY     • COLONOSCOPY N/A 10/26/2022    Procedure: COLONOSCOPY CPT CODE: 57826;  Surgeon: Lane Velazco MD;  Location: [facility] ENDOSCOPY;  Service: Gastroenterology;  Laterality: N/A;   • ENDOMETRIAL ABLATION     • ENDOSCOPY N/A 10/26/2022    Procedure: ESOPHAGOGASTRODUODENOSCOPY WITH BIOPSY CPT CODE: 47311;  Surgeon: Lane Velazco MD;  Location: [facility] ENDOSCOPY;  Service: Gastroenterology;  Laterality: N/A;   • HEMORRHOIDECTOMY    "   had 3 removed   • KIDNEY STONE SURGERY     • LITHOTRIPSY     • TOE SURGERY      \"needle removed from toe\"   • TONSILLECTOMY     • TUBAL ABDOMINAL LIGATION     • URETEROSCOPY LASER LITHOTRIPSY WITH STENT INSERTION Right 2023    Procedure: CYSTOSCOPY RETROGRADE PYELOGRAM AND URETEROSCOPY LASER LITHOTRIPSY WITH RIGHT STENT INSERTION;  Surgeon: Curry Gonzalez MD;  Location: [facility];  Service: Urology;  Laterality: Right;     Family History   Problem Relation Age of Onset   • Arthritis Mother    • Diabetes Mother    • Hypertension Mother    • Osteoporosis Mother    • Thyroid disease Mother    • Hyperlipidemia Mother    • Colon polyps Mother    • Cancer Father    • Kidney disease Father    • Breast cancer Maternal Aunt    • Colon cancer Neg Hx    • Ovarian cancer Neg Hx      Social History     Socioeconomic History   • Marital status:    Tobacco Use   • Smoking status: Former     Packs/day: 1.00     Years: 30.00     Pack years: 30.00     Types: Cigarettes     Start date: 1985     Quit date: 2020     Years since quittin.4   • Smokeless tobacco: Never   Vaping Use   • Vaping Use: Never used   Substance and Sexual Activity   • Alcohol use: Not Currently     Comment: rarely 2 times per year   • Drug use: Never   • Sexual activity: Yes     Partners: Male     Birth control/protection: Tubal ligation       Current Outpatient Medications:   •  buPROPion XL (WELLBUTRIN XL) 300 MG 24 hr tablet, Take 1 tablet by mouth Daily., Disp: , Rfl:   •  cetirizine (zyrTEC) 10 MG tablet, , Disp: , Rfl:   •  Cholecalciferol (Vitamin D) 50 MCG ( UT) tablet, 1 tablet Daily., Disp: , Rfl:   •  cyclobenzaprine (FLEXERIL) 10 MG tablet, Take 1 tablet by mouth 2 (Two) Times a Day As Needed for Muscle Spasms., Disp: 2 tablet, Rfl: 0  •  ezetimibe (ZETIA) 10 MG tablet, Take 1 tablet by mouth Daily., Disp: , Rfl:   •  FLUoxetine (PROzac) 40 MG capsule, , Disp: , Rfl:   •  furosemide (LASIX) 20 MG tablet, " Take 1 tablet by mouth Daily As Needed. PRN, Disp: , Rfl:   •  losartan (COZAAR) 50 MG tablet, Take 1 tablet by mouth Daily., Disp: , Rfl:   •  metFORMIN ER (GLUCOPHAGE-XR) 500 MG 24 hr tablet, Take 1 tablet by mouth Daily With Breakfast., Disp: , Rfl:   •  Mounjaro 7.5 MG/0.5ML solution pen-injector, , Disp: , Rfl:   •  NON FORMULARY, Take 1 dose by mouth Daily. Xylo-unknown dose, Disp: , Rfl:   •  ondansetron ODT (ZOFRAN-ODT) 4 MG disintegrating tablet, Place 1 tablet on the tongue Every 8 (Eight) Hours As Needed for Nausea., Disp: 12 tablet, Rfl: 0  •  pantoprazole (PROTONIX) 40 MG EC tablet, TAKE ONE TABLET BY MOUTH DAILY 30 MINUTES BEFORE BREAKFAST, Disp: 30 tablet, Rfl: 3  •  simethicone (MYLICON) 80 MG chewable tablet, , Disp: , Rfl:   •  Taltz 80 MG/ML solution auto-injector, , Disp: , Rfl:   •  topiramate (TOPAMAX) 25 MG tablet, , Disp: , Rfl:   •  Vraylar 1.5 MG capsule capsule, , Disp: , Rfl:   •  docusate sodium (Colace) 100 MG capsule, Take 1 capsule by mouth 2 (Two) Times a Day As Needed for Constipation. (Patient not taking: Reported on 4/13/2023), Disp: 180 capsule, Rfl: 0  •  Hydrocortisone, Perianal, (Anusol-HC) 2.5 % rectal cream, Insert  into the rectum 2 (Two) Times a Day. (Patient not taking: Reported on 4/13/2023), Disp: 28 g, Rfl: 1  •  linaclotide (Linzess) 72 MCG capsule capsule, Take 1 capsule by mouth Every Morning Before Breakfast. Wait 30 mins before eating. (Patient not taking: Reported on 4/13/2023), Disp: 90 capsule, Rfl: 3  •  loratadine (CLARITIN) 10 MG tablet, , Disp: , Rfl:   •  metoclopramide (REGLAN) 5 MG tablet, , Disp: , Rfl:   •  metoprolol succinate XL (TOPROL-XL) 25 MG 24 hr tablet, , Disp: , Rfl:   •  oxybutynin XL (Ditropan XL) 10 MG 24 hr tablet, Take 1 tablet by mouth Daily As Needed (bladder spasm). (Patient not taking: Reported on 3/22/2023), Disp: 10 tablet, Rfl: 0  •  Semaglutide (OZEMPIC, 2 MG/DOSE, SC), Inject  under the skin into the appropriate area as directed  1 (One) Time Per Week. (Patient not taking: Reported on 4/13/2023), Disp: , Rfl:   •  traZODone (DESYREL) 50 MG tablet, Take 1 tablet by mouth At Night As Needed. (Patient not taking: Reported on 3/22/2023), Disp: , Rfl:     Allergies   Allergen Reactions   • Bee Venom Anaphylaxis   • Codeine Hives     The following portions of the patient's history were reviewed and updated as appropriate: allergies, current medications, past family history, past medical history, past social history, past surgical history and problem list.    Objective     Physical Exam  Constitutional:       General: She is not in acute distress.     Appearance: Normal appearance. She is well-developed. She is obese. She is not diaphoretic.   HENT:      Head: Normocephalic and atraumatic.      Right Ear: External ear normal.      Left Ear: External ear normal.      Nose: Nose normal.   Eyes:      General: No scleral icterus.        Right eye: No discharge.         Left eye: No discharge.      Conjunctiva/sclera: Conjunctivae normal.   Neck:      Trachea: No tracheal deviation.   Pulmonary:      Effort: Pulmonary effort is normal. No respiratory distress.   Abdominal:      General: Bowel sounds are normal. There is no distension.      Palpations: Abdomen is soft.      Tenderness: There is abdominal tenderness (mild epigastric). There is no guarding.   Musculoskeletal:         General: Normal range of motion.      Cervical back: Normal range of motion.   Skin:     Coloration: Skin is not pale.      Findings: No erythema or rash.   Neurological:      Mental Status: She is alert and oriented to person, place, and time.      Coordination: Coordination normal.   Psychiatric:         Mood and Affect: Mood normal.         Behavior: Behavior normal.         Thought Content: Thought content normal.         Judgment: Judgment normal.       Vitals:    04/13/23 1018   BP: 102/66   Pulse: 96   SpO2: 96%   Weight: 91.6 kg (202 lb)     Results Review:   I  reviewed the patient's new clinical results.    CT Abdomen Pelvis Stone Protocol  Result Date: 12/21/2022  1. Moderate right hydronephrosis and hydroureter secondary to a mid-distal right ureteral stone measuring 4 x 6 mm. 2. Dominant nonobstructing left renal stone.      EGD and colonoscopy by Dr. Velazco 10/26/2022:  - The oropharynx was normal.  - The Z-line was regular and was found 35 cm from the incisors.  - No gross lesions were noted in the proximal esophagus. Biopsies were taken with a cold forceps for histology.  - Bilious fluid was found in the stomach.  - Striped moderately erythematous mucosa without bleeding was found on the posterior wall of the stomach, in the gastric antrum and in the prepyloric region of the stomach. Biopsies were taken with a cold forceps for Helicobacter pylori testing.  - The duodenal bulb, first portion of the duodenum, second portion of the duodenum and third portion of the duodenum were normal. Biopsies for histology were taken with a cold forceps for evaluation of celiac disease.  - Hemorrhoids were found on perianal exam.  - A 3 mm polyp was found in the cecum. The polyp was sessile. The polyp was removed with a cold snare. Resection and retrieval were complete.  - A 4 mm polyp was found in the hepatic flexure. The polyp was sessile. The polyp was removed with a cold snare. Resection and retrieval were complete.  - A 3 mm polyp was found in the recto-sigmoid colon. The polyp was sessile. The polyp was removed with a cold snare. Resection and retrieval were complete.  - A few small-mouthed diverticula were found in the sigmoid colon.  - Non-bleeding external and internal hemorrhoids were found. The hemorrhoids were moderate and medium-sized.  - The terminal ileum appeared normal. Biopsies were taken with a cold forceps for histology for change in bowel habit.  - Biopsies were taken with a cold forceps in the sigmoid colon, in the descending colon, in the transverse colon,  in the ascending colon and in the cecum for histology.  Pathology DIAGNOSIS:   A.   SMALL BOWEL, BIOPSIES:   Benign duodenal mucosa with no diagnostic abnormality   B.   GASTRIC ANTRUM BIOPSIES:   Reactive gastropathy   C.   DISTAL ESOPHAGUS, BIOPSIES:   Mildly reactive squamous mucosa   D.   ILEUM, RANDOM BIOPSY:   Benign ileal mucosa with no diagnostic abnormality   E.   CECUM, POLYP, BIOPSIES:   Benign colonic mucosa with no diagnostic abnormality   F.   TRANSVERSE COLON, RANDOM BIOPSIES:   Benign colonic mucosa with no diagnostic abnormality   G.   COLON, HEPATIC FLEXURE, POLYP, BIOPSIES:   Tubular adenoma   H.   RECTOSIGMOID POLYP, BIOPSIES:   Tubular adenoma     Assessment / Plan      1. Belching  2. Bloating  3. Nausea  4. Gastroesophageal reflux disease without esophagitis  Complains of gradually worsening nausea, belching, bloating and severe reflux at night over the past several weeks. She was previously having constipation, taking Linzess daily which she has been holding. She takes protonix 40 mg once daily in the morning, still with these symptoms. Based on history, she is having drug induced gastroparesis (mounjaro). Previous EGD 10/2022 did show bilious fluid in the stomach, no celiac or H pylori on biopsy.     Low fiber diet for now  Start reglan at low dose for next 2 weeks as directed, discussed possible side effects  Add pepcid 40 mg nightly  Consider discontinuing mounjaro, discuss with prescriber  Call back if symptoms persist > 2 more weeks  Could consider gastric emptying study in the future  Will review CTAP findings after completed    - metoclopramide (REGLAN) 5 MG tablet; Take 0.5 tablets by mouth 3 (Three) Times a Day Before Meals for 14 days. After 2 weeks, take only as needed for relief of nausea  Dispense: 90 tablet; Refill: 0  - famotidine (PEPCID) 40 MG tablet; Take 1 tablet by mouth Every Night.  Dispense: 60 tablet; Refill: 3      Personal history  Tubular adenoma of  colon  Colonoscopy  showed 3 colon polyps which were small in size, all were tubular adenomas without high-grade dysplasia. She will need a repeat colonoscopy in 5 years, due 2027.    Follow Up:   Return in about 1 month (around 5/13/2023) for recheck nausea.      Polly Pyle PA-C  [facility] [place]  4/13/2023  11:17 EDT    Dictated Utilizing Dragon Dictation: Part of this note may be an electronic transcription/translation of spoken language to printed text using the Dragon Dictation System.

## 2023-04-17 ENCOUNTER — HOSPITAL ENCOUNTER (OUTPATIENT)
Dept: CT IMAGING | Facility: HOSPITAL | Age: 53
Discharge: HOME | End: 2023-04-17
Admitting: INTERNAL MEDICINE
Payer: MEDICAID

## 2023-04-17 DIAGNOSIS — Z87.19 HISTORY OF DIVERTICULITIS OF COLON: ICD-10-CM

## 2023-04-17 PROCEDURE — 74176 CT ABD & PELVIS W/O CONTRAST: CPT

## 2023-05-22 ENCOUNTER — OFFICE VISIT (OUTPATIENT)
Dept: GASTROENTEROLOGY | Facility: CLINIC | Age: 53
End: 2023-05-22
Payer: MEDICAID

## 2023-05-22 VITALS
WEIGHT: 201 LBS | SYSTOLIC BLOOD PRESSURE: 128 MMHG | BODY MASS INDEX: 34.48 KG/M2 | HEART RATE: 100 BPM | DIASTOLIC BLOOD PRESSURE: 86 MMHG | OXYGEN SATURATION: 98 %

## 2023-05-22 DIAGNOSIS — R14.2 BELCHING: Primary | ICD-10-CM

## 2023-05-22 DIAGNOSIS — R11.0 NAUSEA: ICD-10-CM

## 2023-05-22 DIAGNOSIS — R14.0 BLOATING: ICD-10-CM

## 2023-05-22 DIAGNOSIS — K59.04 CHRONIC IDIOPATHIC CONSTIPATION: ICD-10-CM

## 2023-05-22 DIAGNOSIS — K21.9 GASTROESOPHAGEAL REFLUX DISEASE WITHOUT ESOPHAGITIS: ICD-10-CM

## 2023-05-22 PROCEDURE — 99214 OFFICE O/P EST MOD 30 MIN: CPT | Performed by: PHYSICIAN ASSISTANT

## 2023-05-22 PROCEDURE — 1160F RVW MEDS BY RX/DR IN RCRD: CPT | Performed by: PHYSICIAN ASSISTANT

## 2023-05-22 PROCEDURE — 1159F MED LIST DOCD IN RCRD: CPT | Performed by: PHYSICIAN ASSISTANT

## 2023-05-22 RX ORDER — TIRZEPATIDE 12.5 MG/.5ML
INJECTION, SOLUTION SUBCUTANEOUS
COMMUNITY
Start: 2023-05-11

## 2023-05-22 RX ORDER — LORATADINE 10 MG/1
TABLET ORAL
COMMUNITY
Start: 2023-05-04

## 2023-05-22 RX ORDER — LINACLOTIDE 72 UG/1
CAPSULE, GELATIN COATED ORAL
COMMUNITY
Start: 2023-04-25

## 2023-05-22 RX ORDER — METOCLOPRAMIDE 5 MG/1
2.5 TABLET ORAL DAILY PRN
COMMUNITY

## 2023-05-22 NOTE — PROGRESS NOTES
Follow Up Note     Date: 2023   Patient Name: Ashleigh Hill  MRN: [id number]  : 1970     Primary Care Provider: Livan Boston MD     Chief Complaint   Patient presents with   • Nausea     History of present illness:   2023  Ashleigh Hill is a 53 y.o. female who is here today for follow up regarding Nausea.    She is feeling much better today. She took reglan as directed daily for 2 weeks. She has not been taking it now due to her improvements. Taking pepcid nightly. Bloating, nausea and night time reflux have all improved. She is trying to follow a low fodmap diet, still on mounjaro.     Interval History:  2022  She has been having rectal bleeding for the past 3-4 months, it is bright red in color. Blood is in the stool, on the tissue and in the toilet water. Bleeding only occurs with a BM. She has been told in the past that she has hemorrhodis. She has had a hemorrhoid surgery in the past approx 5 years. She has constipation which aggravates the bleeding. BMs are occurring every 2-3 days and hard with straining. Has tried taking dulcolax, miralax without relief. Also has associated bloating.      Having nausea with vomiting 2-3 times per week. This started approx 3 weeks ago. This is occurring mostly in the mornings, not after eating. She tried taking her medications with food as a way to help with this but she has had vomiting even before taking her medications. Has increased belching. No heartburn. No current reflux medications. She does wake up at night with sensation that something is caught in her throat. No dysphagia. Had EGD several years ago, was told she had a small hiatal hernia, completed by Dr. Landry.      Last colonoscopy was 7-8 years ago and reports having over 20 colon polyps removed then. There is no known family history of colon cancer. Mother had colon polyps.     Subjective     Past Medical History:   Diagnosis Date   • Abdominal pain    • Abnormal  "weight gain    • Anxiety    • Bipolar affective disorder    • Cholelithiasis 2011    removed   • Colon polyp 2015    had over 20 polyps   • Constipation    • Depression    • Elevated cholesterol    • Elevated white blood cell count    • Excessive sweating    • Hematuria    • Hemorrhoids    • Hiatal hernia    • History of psoriasis    • History of thyroid nodule    • Hyperlipidemia    • Hypertension    • Kidney stone    • Leukocytosis    • Lumbago    • Migraines    • Nephrolithiasis     left   • Ovarian cyst     left   • Plantar fasciitis    • Porphyria    • Prediabetes    • Sleep apnea    • Snores    • Tobacco use    • Toe infection    • Upper respiratory infection    • Urinary incontinence 2021   • Uses contact lenses    • Vaginitis    • Visit for screening mammogram      Past Surgical History:   Procedure Laterality Date   • ABDOMINOPLASTY     • BREAST RECONSTRUCTION Bilateral 2010    Breast Lift   •  SECTION      x2   • CHOLECYSTECTOMY     • COLONOSCOPY     • COLONOSCOPY N/A 10/26/2022    Procedure: COLONOSCOPY CPT CODE: 12472;  Surgeon: Lane Velazco MD;  Location: [facility] ENDOSCOPY;  Service: Gastroenterology;  Laterality: N/A;   • ENDOMETRIAL ABLATION     • ENDOSCOPY N/A 10/26/2022    Procedure: ESOPHAGOGASTRODUODENOSCOPY WITH BIOPSY CPT CODE: 61474;  Surgeon: Lane Velazco MD;  Location: [facility] ENDOSCOPY;  Service: Gastroenterology;  Laterality: N/A;   • HEMORRHOIDECTOMY      had 3 removed   • KIDNEY STONE SURGERY     • LITHOTRIPSY     • TOE SURGERY      \"needle removed from toe\"   • TONSILLECTOMY     • TUBAL ABDOMINAL LIGATION     • URETEROSCOPY LASER LITHOTRIPSY WITH STENT INSERTION Right 2023    Procedure: CYSTOSCOPY RETROGRADE PYELOGRAM AND URETEROSCOPY LASER LITHOTRIPSY WITH RIGHT STENT INSERTION;  Surgeon: Curry Gonzalez MD;  Location: [facility] OR;  Service: Urology;  Laterality: Right;     Family History   Problem Relation Age of " Onset   • Arthritis Mother    • Diabetes Mother    • Hypertension Mother    • Osteoporosis Mother    • Thyroid disease Mother    • Hyperlipidemia Mother    • Colon polyps Mother    • Cancer Father    • Kidney disease Father    • Breast cancer Maternal Aunt    • Colon cancer Neg Hx    • Ovarian cancer Neg Hx      Social History     Socioeconomic History   • Marital status:    Tobacco Use   • Smoking status: Former     Packs/day: 1.00     Years: 30.00     Pack years: 30.00     Types: Cigarettes     Start date: 1985     Quit date: 2020     Years since quittin.5   • Smokeless tobacco: Never   Vaping Use   • Vaping Use: Never used   Substance and Sexual Activity   • Alcohol use: Not Currently     Comment: rarely 2 times per year   • Drug use: Never   • Sexual activity: Yes     Partners: Male     Birth control/protection: Tubal ligation     Current Outpatient Medications:   •  buPROPion XL (WELLBUTRIN XL) 300 MG 24 hr tablet, Take 1 tablet by mouth Daily., Disp: , Rfl:   •  Cholecalciferol (Vitamin D) 50 MCG ( [place]) tablet, 1 tablet Daily., Disp: , Rfl:   •  ezetimibe (ZETIA) 10 MG tablet, Take 1 tablet by mouth Daily., Disp: , Rfl:   •  famotidine (PEPCID) 40 MG tablet, Take 1 tablet by mouth Every Night., Disp: 60 tablet, Rfl: 3  •  FLUoxetine (PROzac) 40 MG capsule, , Disp: , Rfl:   •  furosemide (LASIX) 20 MG tablet, Take 1 tablet by mouth Daily As Needed. PRN, Disp: , Rfl:   •  Linzess 72 MCG capsule capsule, , Disp: , Rfl:   •  loratadine (CLARITIN) 10 MG tablet, , Disp: , Rfl:   •  losartan (COZAAR) 50 MG tablet, Take 1 tablet by mouth Daily., Disp: , Rfl:   •  Mounjaro 12.5 MG/0.5ML solution pen-injector, , Disp: , Rfl:   •  ondansetron ODT (ZOFRAN-ODT) 4 MG disintegrating tablet, Place 1 tablet on the tongue Every 8 (Eight) Hours As Needed for Nausea., Disp: 12 tablet, Rfl: 0  •  pantoprazole (PROTONIX) 40 MG EC tablet, TAKE ONE TABLET BY MOUTH DAILY 30 MINUTES BEFORE BREAKFAST, Disp: 30  tablet, Rfl: 3  •  simethicone (MYLICON) 80 MG chewable tablet, , Disp: , Rfl:   •  Taltz 80 MG/ML solution auto-injector, , Disp: , Rfl:   •  Vraylar 1.5 MG capsule capsule, , Disp: , Rfl:     Allergies   Allergen Reactions   • Bee Venom Anaphylaxis   • Codeine Hives     The following portions of the patient's history were reviewed and updated as appropriate: allergies, current medications, past family history, past medical history, past social history, past surgical history and problem list.    Objective     Physical Exam  Constitutional:       General: She is not in acute distress.     Appearance: Normal appearance. She is well-developed. She is not diaphoretic.   HENT:      Head: Normocephalic and atraumatic.      Right Ear: External ear normal.      Left Ear: External ear normal.      Nose: Nose normal.   Eyes:      General: No scleral icterus.        Right eye: No discharge.         Left eye: No discharge.      Conjunctiva/sclera: Conjunctivae normal.   Neck:      Trachea: No tracheal deviation.   Pulmonary:      Effort: Pulmonary effort is normal. No respiratory distress.   Musculoskeletal:         General: Normal range of motion.      Cervical back: Normal range of motion.   Skin:     Coloration: Skin is not pale.      Findings: No erythema or rash.   Neurological:      Mental Status: She is alert and oriented to person, place, and time.      Coordination: Coordination normal.   Psychiatric:         Mood and Affect: Mood normal.         Behavior: Behavior normal.         Thought Content: Thought content normal.         Judgment: Judgment normal.       Vitals:    05/22/23 0930   BP: 128/86   Pulse: 100   SpO2: 98%   Weight: 91.2 kg (201 lb)     Results Review:   I reviewed the patient's new clinical results.    CT Abdomen Pelvis Stone Protocol  Result Date: 12/21/2022  1. Moderate right hydronephrosis and hydroureter secondary to a mid-distal right ureteral stone measuring 4 x 6 mm. 2. Dominant nonobstructing  left renal stone.      EGD and colonoscopy by Dr. Velazco 10/26/2022:  - The oropharynx was normal.  - The Z-line was regular and was found 35 cm from the incisors.  - No gross lesions were noted in the proximal esophagus. Biopsies were taken with a cold forceps for histology.  - Bilious fluid was found in the stomach.  - Striped moderately erythematous mucosa without bleeding was found on the posterior wall of the stomach, in the gastric antrum and in the prepyloric region of the stomach. Biopsies were taken with a cold forceps for Helicobacter pylori testing.  - The duodenal bulb, first portion of the duodenum, second portion of the duodenum and third portion of the duodenum were normal. Biopsies for histology were taken with a cold forceps for evaluation of celiac disease.  - Hemorrhoids were found on perianal exam.  - A 3 mm polyp was found in the cecum. The polyp was sessile. The polyp was removed with a cold snare. Resection and retrieval were complete.  - A 4 mm polyp was found in the hepatic flexure. The polyp was sessile. The polyp was removed with a cold snare. Resection and retrieval were complete.  - A 3 mm polyp was found in the recto-sigmoid colon. The polyp was sessile. The polyp was removed with a cold snare. Resection and retrieval were complete.  - A few small-mouthed diverticula were found in the sigmoid colon.  - Non-bleeding external and internal hemorrhoids were found. The hemorrhoids were moderate and medium-sized.  - The terminal ileum appeared normal. Biopsies were taken with a cold forceps for histology for change in bowel habit.  - Biopsies were taken with a cold forceps in the sigmoid colon, in the descending colon, in the transverse colon, in the ascending colon and in the cecum for histology.  Pathology DIAGNOSIS:   A.   SMALL BOWEL, BIOPSIES:   Benign duodenal mucosa with no diagnostic abnormality   B.   GASTRIC ANTRUM BIOPSIES:   Reactive gastropathy   C.   DISTAL ESOPHAGUS,  BIOPSIES:   Mildly reactive squamous mucosa   D.   ILEUM, RANDOM BIOPSY:   Benign ileal mucosa with no diagnostic abnormality   E.   CECUM, POLYP, BIOPSIES:   Benign colonic mucosa with no diagnostic abnormality   F.   TRANSVERSE COLON, RANDOM BIOPSIES:   Benign colonic mucosa with no diagnostic abnormality   G.   COLON, HEPATIC FLEXURE, POLYP, BIOPSIES:   Tubular adenoma   H.   RECTOSIGMOID POLYP, BIOPSIES:   Tubular adenoma     CT Abdomen Pelvis Without Contrast  Result Date: 4/17/2023  Impression: 1. No acute process seen within the abdomen or pelvis. 2. Incidental left adrenal adenoma. 3. Nonobstructing left renal stone.      Assessment / Plan      1. Belching  2. Bloating  3. Nausea  4. Gastroesophageal reflux disease without esophagitis  5. Chronic idiopathic constipation  Had complained of gradually worsening nausea, belching, bloating and severe reflux at night over several weeks. This has improved now, took low dose reglan for 2 weeks. Added pepcid at night. Following a low fiber diet. Now feeling much better, with mild symptoms.  Now taking Linzess 72 mcg daily for constipation with BMs daily. She takes protonix 40 mg once daily in the morning. Based on history, she has drug induced gastroparesis (mounjaro). Previous EGD 10/2022 did show bilious fluid in the stomach, no celiac or H pylori on biopsy. CTAP 4/2023 no acute findings, showed constipation on my review.      Low fiber diet when symptoms present  Ok to continue low dose reglan, only sparingly PRN nausea  Continue pepcid 40 mg nightly  Continue PPI daily  Could consider gastric emptying study in the future  Continue Linzess 72 mcg once daily  Call with concerns       Personal history  Tubular adenoma of colon  Colonoscopy  showed 3 colon polyps which were small in size, all were tubular adenomas without high-grade dysplasia. She will need a repeat colonoscopy in 5 years, due 2027.    Follow Up:   Return in about 3 months (around 8/22/2023)  for recheck nausea.    Polly Pyle PA-C  [facility]  5/22/2023  13:04 EDT    Dictated Utilizing Dragon Dictation: Part of this note may be an electronic transcription/translation of spoken language to printed text using the Dragon Dictation System.

## 2023-08-04 DIAGNOSIS — K21.9 GASTROESOPHAGEAL REFLUX DISEASE, UNSPECIFIED WHETHER ESOPHAGITIS PRESENT: ICD-10-CM

## 2023-08-04 RX ORDER — PANTOPRAZOLE SODIUM 40 MG/1
TABLET, DELAYED RELEASE ORAL
Qty: 30 TABLET | Refills: 3 | Status: SHIPPED | OUTPATIENT
Start: 2023-08-04

## 2023-08-31 ENCOUNTER — OFFICE VISIT (OUTPATIENT)
Dept: GASTROENTEROLOGY | Facility: CLINIC | Age: 53
End: 2023-08-31
Payer: MEDICAID

## 2023-08-31 VITALS
DIASTOLIC BLOOD PRESSURE: 80 MMHG | BODY MASS INDEX: 33.63 KG/M2 | OXYGEN SATURATION: 99 % | HEART RATE: 83 BPM | SYSTOLIC BLOOD PRESSURE: 120 MMHG | WEIGHT: 196 LBS

## 2023-08-31 DIAGNOSIS — K21.9 GASTROESOPHAGEAL REFLUX DISEASE WITHOUT ESOPHAGITIS: Primary | ICD-10-CM

## 2023-08-31 DIAGNOSIS — K31.84 GASTROPARESIS: ICD-10-CM

## 2023-08-31 DIAGNOSIS — K59.04 CHRONIC IDIOPATHIC CONSTIPATION: Chronic | ICD-10-CM

## 2023-08-31 PROCEDURE — 99214 OFFICE O/P EST MOD 30 MIN: CPT | Performed by: PHYSICIAN ASSISTANT

## 2023-08-31 PROCEDURE — 1160F RVW MEDS BY RX/DR IN RCRD: CPT | Performed by: PHYSICIAN ASSISTANT

## 2023-08-31 PROCEDURE — 1159F MED LIST DOCD IN RCRD: CPT | Performed by: PHYSICIAN ASSISTANT

## 2023-08-31 RX ORDER — FAMOTIDINE 40 MG/1
40 TABLET, FILM COATED ORAL NIGHTLY
Qty: 90 TABLET | Refills: 3 | Status: SHIPPED | OUTPATIENT
Start: 2023-08-31

## 2023-08-31 RX ORDER — PANTOPRAZOLE SODIUM 40 MG/1
40 TABLET, DELAYED RELEASE ORAL
Qty: 90 TABLET | Refills: 3 | Status: SHIPPED | OUTPATIENT
Start: 2023-08-31

## 2023-08-31 RX ORDER — TIRZEPATIDE 5 MG/.5ML
INJECTION, SOLUTION SUBCUTANEOUS
COMMUNITY
Start: 2023-08-03 | End: 2023-08-31 | Stop reason: DRUGHIGH

## 2023-08-31 RX ORDER — TIRZEPATIDE 15 MG/.5ML
INJECTION, SOLUTION SUBCUTANEOUS
COMMUNITY
Start: 2023-08-01

## 2023-08-31 NOTE — PROGRESS NOTES
Follow Up Note     Date: 2023   Patient Name: Ashleigh Hill  MRN: [id number]  : 1970     Primary Care Provider: Livan Boston MD     Chief Complaint   Patient presents with    Nausea     History of present illness:   2023  Ashleigh Hill is a 53 y.o. female who is here today for follow up regarding Nausea.    She is feeling much better now than previous. Nausea has improved. No bloating or belching. She is still on Mounjaro. Taking Linzess for constipation. No current abdominal pain.     Interval History:  2022  She has been having rectal bleeding for the past 3-4 months, it is bright red in color. Blood is in the stool, on the tissue and in the toilet water. Bleeding only occurs with a BM. She has been told in the past that she has hemorrhodis. She has had a hemorrhoid surgery in the past approx 5 years. She has constipation which aggravates the bleeding. BMs are occurring every 2-3 days and hard with straining. Has tried taking dulcolax, miralax without relief. Also has associated bloating.      Having nausea with vomiting 2-3 times per week. This started approx 3 weeks ago. This is occurring mostly in the mornings, not after eating. She tried taking her medications with food as a way to help with this but she has had vomiting even before taking her medications. Has increased belching. No heartburn. No current reflux medications. She does wake up at night with sensation that something is caught in her throat. No dysphagia. Had EGD several years ago, was told she had a small hiatal hernia, completed by Dr. Landry.      Last colonoscopy was 7-8 years ago and reports having over 20 colon polyps removed then. There is no known family history of colon cancer. Mother had colon polyps.     Subjective     Past Medical History:   Diagnosis Date    Abdominal pain     Abnormal weight gain     Anxiety     Bipolar affective disorder     Cholelithiasis     removed    Colon polyp  "2015    had over 20 polyps    Constipation     Depression     Elevated cholesterol     Elevated white blood cell count     Excessive sweating     Hematuria     Hemorrhoids     Hiatal hernia     History of psoriasis     History of thyroid nodule     Hyperlipidemia     Hypertension     Kidney stone     Leukocytosis     Lumbago     Migraines     Nephrolithiasis     left    Ovarian cyst     left    Plantar fasciitis     Porphyria     Prediabetes     Sleep apnea     Snores     Tobacco use     Toe infection     Upper respiratory infection     Urinary incontinence 2021    Uses contact lenses     Vaginitis     Visit for screening mammogram      Past Surgical History:   Procedure Laterality Date    ABDOMINOPLASTY  2010    BREAST RECONSTRUCTION Bilateral 2010    Breast Lift     SECTION      x2    CHOLECYSTECTOMY      COLONOSCOPY      COLONOSCOPY N/A 10/26/2022    Procedure: COLONOSCOPY CPT CODE: 56785;  Surgeon: Lane Velazco MD;  Location: [facility] ENDOSCOPY;  Service: Gastroenterology;  Laterality: N/A;    ENDOMETRIAL ABLATION      ENDOSCOPY N/A 10/26/2022    Procedure: ESOPHAGOGASTRODUODENOSCOPY WITH BIOPSY CPT CODE: 19579;  Surgeon: Lane Velazco MD;  Location: [facility] ENDOSCOPY;  Service: Gastroenterology;  Laterality: N/A;    HEMORRHOIDECTOMY      had 3 removed    KIDNEY STONE SURGERY      LITHOTRIPSY  2018    TOE SURGERY      \"needle removed from toe\"    TONSILLECTOMY      TUBAL ABDOMINAL LIGATION      URETEROSCOPY LASER LITHOTRIPSY WITH STENT INSERTION Right 2023    Procedure: CYSTOSCOPY RETROGRADE PYELOGRAM AND URETEROSCOPY LASER LITHOTRIPSY WITH RIGHT STENT INSERTION;  Surgeon: Curry Gonzalez MD;  Location: [facility] OR;  Service: Urology;  Laterality: Right;     Family History   Problem Relation Age of Onset    Arthritis Mother     Diabetes Mother     Hypertension Mother     Osteoporosis Mother     Thyroid disease Mother     Hyperlipidemia Mother     Colon " polyps Mother     Cancer Father     Kidney disease Father     Breast cancer Maternal Aunt     Colon cancer Neg Hx     Ovarian cancer Neg Hx      Social History     Socioeconomic History    Marital status:    Tobacco Use    Smoking status: Former     Packs/day: 1.00     Years: 30.00     Pack years: 30.00     Types: Cigarettes     Start date: 1985     Quit date: 2020     Years since quittin.8    Smokeless tobacco: Never   Vaping Use    Vaping Use: Never used   Substance and Sexual Activity    Alcohol use: Not Currently     Comment: rarely 2 times per year    Drug use: Never    Sexual activity: Yes     Partners: Male     Birth control/protection: Tubal ligation       Current Outpatient Medications:     buPROPion XL (WELLBUTRIN XL) 300 MG 24 hr tablet, Take 1 tablet by mouth Daily., Disp: , Rfl:     Cholecalciferol (Vitamin D) 50 MCG (2000) tablet, 1 tablet Daily., Disp: , Rfl:     ezetimibe (ZETIA) 10 MG tablet, Take 1 tablet by mouth Daily., Disp: , Rfl:     famotidine (PEPCID) 40 MG tablet, Take 1 tablet by mouth Every Night., Disp: 90 tablet, Rfl: 3    FLUoxetine (PROzac) 40 MG capsule, , Disp: , Rfl:     furosemide (LASIX) 20 MG tablet, Take 1 tablet by mouth Daily As Needed. PRN, Disp: , Rfl:     Linzess 72 MCG capsule capsule, , Disp: , Rfl:     loratadine (CLARITIN) 10 MG tablet, , Disp: , Rfl:     losartan (COZAAR) 50 MG tablet, Take 1 tablet by mouth Daily., Disp: , Rfl:     metoclopramide (REGLAN) 5 MG tablet, Take 0.5 tablets by mouth Daily As Needed for Nausea., Disp: , Rfl:     Mounjaro 15 MG/0.5ML solution pen-injector, , Disp: , Rfl:     ondansetron ODT (ZOFRAN-ODT) 4 MG disintegrating tablet, Place 1 tablet on the tongue Every 8 (Eight) Hours As Needed for Nausea., Disp: 12 tablet, Rfl: 0    pantoprazole (PROTONIX) 40 MG EC tablet, Take 1 tablet by mouth Every Morning Before Breakfast., Disp: 90 tablet, Rfl: 3    simethicone (MYLICON) 80 MG chewable tablet, , Disp: , Rfl:      Taltz 80 MG/ML solution auto-injector, , Disp: , Rfl:     Vraylar 1.5 MG capsule capsule, , Disp: , Rfl:     Allergies   Allergen Reactions    Bee Venom Anaphylaxis    Codeine Hives     The following portions of the patient's history were reviewed and updated as appropriate: allergies, current medications, past family history, past medical history, past social history, past surgical history and problem list.    Objective     Physical Exam  Constitutional:       General: She is not in acute distress.     Appearance: Normal appearance. She is well-developed. She is not diaphoretic.   HENT:      Head: Normocephalic and atraumatic.      Right Ear: External ear normal.      Left Ear: External ear normal.      Nose: Nose normal.   Eyes:      General: No scleral icterus.        Right eye: No discharge.         Left eye: No discharge.      Conjunctiva/sclera: Conjunctivae normal.   Neck:      Trachea: No tracheal deviation.   Pulmonary:      Effort: Pulmonary effort is normal. No respiratory distress.   Musculoskeletal:         General: Normal range of motion.      Cervical back: Normal range of motion.   Skin:     Coloration: Skin is not pale.      Findings: No erythema or rash.   Neurological:      Mental Status: She is alert and oriented to person, place, and time.      Coordination: Coordination normal.   Psychiatric:         Mood and Affect: Mood normal.         Behavior: Behavior normal.         Thought Content: Thought content normal.         Judgment: Judgment normal.     Vitals:    08/31/23 1128   BP: 120/80   Pulse: 83   SpO2: 99%   Weight: 88.9 kg (196 lb)     Results Review:   I reviewed the patient's new clinical results.    Lab on 07/18/2023   Component Date Value Ref Range Status    Glucose 07/18/2023 88  65 - 99 mg/dL Final    BUN 07/18/2023 12  6 - 20 mg/dL Final    Creatinine 07/18/2023 1.23 (H)  0.57 - 1.00 mg/dL Final    Sodium 07/18/2023 139  136 - 145 mmol/L Final    Potassium 07/18/2023 4.5  3.5 - 5.2  mmol/L Final    Chloride 07/18/2023 102  98 - 107 mmol/L Final    CO2 07/18/2023 24.7  22.0 - 29.0 mmol/L Final    Calcium 07/18/2023 10.4  8.6 - 10.5 mg/dL Final    Total Protein 07/18/2023 7.1  6.0 - 8.5 g/dL Final    Albumin 07/18/2023 4.6  3.5 - 5.2 g/dL Final    ALT (SGPT) 07/18/2023 23  1 - 33 U/L Final    AST (SGOT) 07/18/2023 21  1 - 32 U/L Final    Alkaline Phosphatase 07/18/2023 101  39 - 117 U/L Final    Total Bilirubin 07/18/2023 0.2  0.0 - 1.2 mg/dL Final    Globulin 07/18/2023 2.5  gm/dL Final    A/G Ratio 07/18/2023 1.8  g/dL Final    BUN/Creatinine Ratio 07/18/2023 9.8  7.0 - 25.0 Final    Anion Gap 07/18/2023 12.3  5.0 - 15.0 mmol/L Final    eGFR 07/18/2023 52.3 (L)  >60.0 mL/min/1.73 Final    QuantiFERON Incubation 07/18/2023 Incubation performed.   Final    QUANTIFERON-TB GOLD PLUS 07/18/2023 Negative  Negative Final    No response to M tuberculosis antigens detected.  Infection with M tuberculosis is unlikely, but high risk  individuals should be considered for additional testing  (ATS/IDSA/CDC Clinical Practice Guidelines, 2017). The  reference range is an Antigen minus Nil result of <0.35 IU/mL.  Chemiluminescence immunoassay methodology    Bilirubin, Direct 07/18/2023 <0.2  0.0 - 0.3 mg/dL Final    Total Cholesterol 07/18/2023 169  0 - 200 mg/dL Final    Triglycerides 07/18/2023 202 (H)  0 - 150 mg/dL Final    HDL Cholesterol 07/18/2023 36 (L)  40 - 60 mg/dL Final    LDL Cholesterol  07/18/2023 98  0 - 100 mg/dL Final    VLDL Cholesterol 07/18/2023 35  5 - 40 mg/dL Final    LDL/HDL Ratio 07/18/2023 2.57   Final    Hemoglobin A1C 07/18/2023 5.70 (H)  4.80 - 5.60 % Final    WBC 07/18/2023 11.43 (H)  3.40 - 10.80 10*3/mm3 Final    RBC 07/18/2023 4.87  3.77 - 5.28 10*6/mm3 Final    Hemoglobin 07/18/2023 14.7  12.0 - 15.9 g/dL Final    Hematocrit 07/18/2023 43.9  34.0 - 46.6 % Final    MCV 07/18/2023 90.1  79.0 - 97.0 fL Final    MCH 07/18/2023 30.2  26.6 - 33.0 pg Final    MCHC 07/18/2023 33.5   31.5 - 35.7 g/dL Final    RDW 07/18/2023 12.5  12.3 - 15.4 % Final    RDW-SD 07/18/2023 41.2  37.0 - 54.0 fl Final    MPV 07/18/2023 10.1  6.0 - 12.0 fL Final    Platelets 07/18/2023 406  140 - 450 10*3/mm3 Final    QuantiFERON Criteria 07/18/2023 Comment   Final    QuantiFERON-TB Gold Plus is a qualitative indirect test for  M tuberculosis infection (including disease) and is intended for use  in conjunction with risk assessment, radiography, and other medical  and diagnostic evaluations. The QuantiFERON-TB Gold Plus result is  determined by subtracting the Nil value from either TB antigen (Ag)  value. The Mitogen tube serves as a control for the test.    QUANTIFERON TB1 AG VALUE 07/18/2023 0.03  IU/mL Final    QUANTIFERON TB2 AG VALUE 07/18/2023 0.02  IU/mL Final    QuantiFERON Nil Value 07/18/2023 0.02  IU/mL Final    QuantiFERON Mitogen Value 07/18/2023 >10.00  IU/mL Final      CT Abdomen Pelvis Stone Protocol  Result Date: 12/21/2022  1. Moderate right hydronephrosis and hydroureter secondary to a mid-distal right ureteral stone measuring 4 x 6 mm. 2. Dominant nonobstructing left renal stone.      EGD and colonoscopy by Dr. Velazco 10/26/2022:  - The oropharynx was normal.  - The Z-line was regular and was found 35 cm from the incisors.  - No gross lesions were noted in the proximal esophagus. Biopsies were taken with a cold forceps for histology.  - Bilious fluid was found in the stomach.  - Striped moderately erythematous mucosa without bleeding was found on the posterior wall of the stomach, in the gastric antrum and in the prepyloric region of the stomach. Biopsies were taken with a cold forceps for Helicobacter pylori testing.  - The duodenal bulb, first portion of the duodenum, second portion of the duodenum and third portion of the duodenum were normal. Biopsies for histology were taken with a cold forceps for evaluation of celiac disease.  - Hemorrhoids were found on perianal exam.  - A 3 mm polyp was  found in the cecum. The polyp was sessile. The polyp was removed with a cold snare. Resection and retrieval were complete.  - A 4 mm polyp was found in the hepatic flexure. The polyp was sessile. The polyp was removed with a cold snare. Resection and retrieval were complete.  - A 3 mm polyp was found in the recto-sigmoid colon. The polyp was sessile. The polyp was removed with a cold snare. Resection and retrieval were complete.  - A few small-mouthed diverticula were found in the sigmoid colon.  - Non-bleeding external and internal hemorrhoids were found. The hemorrhoids were moderate and medium-sized.  - The terminal ileum appeared normal. Biopsies were taken with a cold forceps for histology for change in bowel habit.  - Biopsies were taken with a cold forceps in the sigmoid colon, in the descending colon, in the transverse colon, in the ascending colon and in the cecum for histology.  Pathology DIAGNOSIS:   A.   SMALL BOWEL, BIOPSIES:   Benign duodenal mucosa with no diagnostic abnormality   B.   GASTRIC ANTRUM BIOPSIES:   Reactive gastropathy   C.   DISTAL ESOPHAGUS, BIOPSIES:   Mildly reactive squamous mucosa   D.   ILEUM, RANDOM BIOPSY:   Benign ileal mucosa with no diagnostic abnormality   E.   CECUM, POLYP, BIOPSIES:   Benign colonic mucosa with no diagnostic abnormality   F.   TRANSVERSE COLON, RANDOM BIOPSIES:   Benign colonic mucosa with no diagnostic abnormality   G.   COLON, HEPATIC FLEXURE, POLYP, BIOPSIES:   Tubular adenoma   H.   RECTOSIGMOID POLYP, BIOPSIES:   Tubular adenoma      CT Abdomen Pelvis Without Contrast  Result Date: 4/17/2023  Impression: 1. No acute process seen within the abdomen or pelvis. 2. Incidental left adrenal adenoma. 3. Nonobstructing left renal stone.    Assessment / Plan      1. Gastroesophageal reflux disease without esophagitis  2. Gastroparesis  3. Chronic idiopathic constipation  Had complained of gradually worsening nausea, belching, bloating and severe reflux at  night over several weeks. This has improved now, took low dose reglan for 2 weeks when symptoms were severe. Still taking PPI daily plus pepcid at night. Following a low fiber diet. Taking Linzess 72 mcg daily for constipation with BMs daily. Based on history, she had drug induced gastroparesis (mounjaro). Previous EGD 10/2022 did show bilious fluid in the stomach, no celiac or H pylori on biopsy. CTAP 4/2023 no acute findings, showed constipation on my review.      Low fiber diet when severe symptoms present  Ok to continue low dose reglan, only sparingly PRN nausea  Continue pepcid 40 mg nightly  Continue PPI daily  Could consider gastric emptying study in the future  Continue Linzess 72 mcg once daily    - famotidine (PEPCID) 40 MG tablet; Take 1 tablet by mouth Every Night.  Dispense: 90 tablet; Refill: 3  - pantoprazole (PROTONIX) 40 MG EC tablet; Take 1 tablet by mouth Every Morning Before Breakfast.  Dispense: 90 tablet; Refill: 3      Prior history  Tubular adenoma of colon  Colonoscopy  showed 3 colon polyps which were small in size, all were tubular adenomas without high-grade dysplasia. She will need a repeat colonoscopy in 5 years, due 2027.     Follow Up:   Return if symptoms worsen or fail to improve. She will call back if needed to arrange follow up.    Polly Pyle PA-C  [facility]  8/31/2023  16:06 EDT    Dictated Utilizing Dragon Dictation: Part of this note may be an electronic transcription/translation of spoken language to printed text using the Dragon Dictation System.

## 2023-09-19 ENCOUNTER — TELEPHONE (OUTPATIENT)
Dept: UROLOGY | Facility: CLINIC | Age: 53
End: 2023-09-19
Payer: MEDICAID

## 2023-09-19 NOTE — TELEPHONE ENCOUNTER
Called and spoke with patient and advised her to arrive early for her appt and get a KUB before her appt.     JOSE Wisdom

## 2023-09-22 ENCOUNTER — OFFICE VISIT (OUTPATIENT)
Dept: UROLOGY | Facility: CLINIC | Age: 53
End: 2023-09-22
Payer: MEDICAID

## 2023-09-22 ENCOUNTER — HOSPITAL ENCOUNTER (OUTPATIENT)
Dept: GENERAL RADIOLOGY | Facility: HOSPITAL | Age: 53
Discharge: HOME | End: 2023-09-22
Admitting: PHYSICIAN ASSISTANT
Payer: MEDICAID

## 2023-09-22 VITALS
SYSTOLIC BLOOD PRESSURE: 138 MMHG | BODY MASS INDEX: 31.58 KG/M2 | HEIGHT: 64 IN | DIASTOLIC BLOOD PRESSURE: 92 MMHG | TEMPERATURE: 97.2 F | HEART RATE: 94 BPM | OXYGEN SATURATION: 100 % | WEIGHT: 185 LBS

## 2023-09-22 DIAGNOSIS — N20.0 KIDNEY STONE: ICD-10-CM

## 2023-09-22 DIAGNOSIS — N20.0 KIDNEY STONE: Primary | ICD-10-CM

## 2023-09-22 LAB
BILIRUB BLD-MCNC: NEGATIVE MG/DL
CLARITY, POC: CLEAR
COLOR UR: YELLOW
EXPIRATION DATE: ABNORMAL
GLUCOSE UR STRIP-MCNC: NEGATIVE MG/DL
KETONES UR QL: NEGATIVE
LEUKOCYTE EST, POC: NEGATIVE
Lab: ABNORMAL
NITRITE UR-MCNC: NEGATIVE MG/ML
PH UR: 6.5 [PH] (ref 5–8)
PROT UR STRIP-MCNC: NEGATIVE MG/DL
RBC # UR STRIP: ABNORMAL /UL
SP GR UR: 1.01 (ref 1–1.03)
UROBILINOGEN UR QL: NORMAL

## 2023-09-22 PROCEDURE — 74018 RADEX ABDOMEN 1 VIEW: CPT

## 2023-09-22 NOTE — PROGRESS NOTES
"Chief Complaint   Patient presents with    Kidney Stone     6 month fu w/ KUB & litholink results        HPI  Ms. Hill is a 53 y.o. female with history of nephrolithiasis s/p right URS/LL who presents for follow up.     At this visit, has had no flank pain or hematuria.    Past Medical History:   Diagnosis Date    Abdominal pain     Abnormal weight gain     Anxiety     Bipolar affective disorder     Cholelithiasis     removed    Colon polyp     had over 20 polyps    Constipation     Depression     Elevated cholesterol     Elevated white blood cell count     Excessive sweating     Hematuria     Hemorrhoids     Hiatal hernia     History of psoriasis     History of thyroid nodule     Hyperlipidemia     Hypertension     Kidney stone     Leukocytosis     Lumbago     Migraines     Nephrolithiasis     left    Ovarian cyst     left    Plantar fasciitis     Porphyria     Prediabetes     Sleep apnea     Snores     Tobacco use     Toe infection     Upper respiratory infection     Urinary incontinence 2021    Uses contact lenses     Vaginitis     Visit for screening mammogram        Past Surgical History:   Procedure Laterality Date    ABDOMINOPLASTY  2010    BREAST RECONSTRUCTION Bilateral 2010    Breast Lift     SECTION      x2    CHOLECYSTECTOMY      COLONOSCOPY      COLONOSCOPY N/A 10/26/2022    Procedure: COLONOSCOPY CPT CODE: 07075;  Surgeon: Lane Velazco MD;  Location: [facility] ENDOSCOPY;  Service: Gastroenterology;  Laterality: N/A;    ENDOMETRIAL ABLATION      ENDOSCOPY N/A 10/26/2022    Procedure: ESOPHAGOGASTRODUODENOSCOPY WITH BIOPSY CPT CODE: 00209;  Surgeon: Lane Velazco MD;  Location: [facility] ENDOSCOPY;  Service: Gastroenterology;  Laterality: N/A;    HEMORRHOIDECTOMY      had 3 removed    KIDNEY STONE SURGERY      LITHOTRIPSY      TOE SURGERY      \"needle removed from toe\"    TONSILLECTOMY      TUBAL ABDOMINAL LIGATION      URETEROSCOPY LASER " "LITHOTRIPSY WITH STENT INSERTION Right 01/05/2023    Procedure: CYSTOSCOPY RETROGRADE PYELOGRAM AND URETEROSCOPY LASER LITHOTRIPSY WITH RIGHT STENT INSERTION;  Surgeon: Curry Gonzalez MD;  Location: [facility];  Service: Urology;  Laterality: Right;         Current Outpatient Medications:     buPROPion XL (WELLBUTRIN XL) 300 MG 24 hr tablet, Take 1 tablet by mouth Daily., Disp: , Rfl:     Cholecalciferol (Vitamin D) 50 MCG (2000 UT) tablet, 1 tablet Daily., Disp: , Rfl:     ezetimibe (ZETIA) 10 MG tablet, Take 1 tablet by mouth Daily., Disp: , Rfl:     famotidine (PEPCID) 40 MG tablet, Take 1 tablet by mouth Every Night., Disp: 90 tablet, Rfl: 3    FLUoxetine (PROzac) 40 MG capsule, , Disp: , Rfl:     Linzess 72 MCG capsule capsule, , Disp: , Rfl:     loratadine (CLARITIN) 10 MG tablet, , Disp: , Rfl:     losartan (COZAAR) 50 MG tablet, Take 1 tablet by mouth Daily., Disp: , Rfl:     Mounjaro 15 MG/0.5ML solution pen-injector, , Disp: , Rfl:     pantoprazole (PROTONIX) 40 MG EC tablet, Take 1 tablet by mouth Every Morning Before Breakfast., Disp: 90 tablet, Rfl: 3    Taltz 80 MG/ML solution auto-injector, , Disp: , Rfl:     Vraylar 1.5 MG capsule capsule, , Disp: , Rfl:      Physical Exam  Visit Vitals  /92 (BP Location: Left arm, Patient Position: Sitting, Cuff Size: Adult)   Pulse 94   Temp 97.2 °F (36.2 °C) (Temporal)   Ht 162.6 cm (64.02\")   Wt 83.9 kg (185 lb)   SpO2 100%   BMI 31.74 kg/m²       Labs  Brief Urine Lab Results  (Last result in the past 365 days)        Color   Clarity   Blood   Leuk Est   Nitrite   Protein   CREAT   Urine HCG        09/22/23 0911 Yellow   Clear   2+   Negative   Negative   Negative                   Lab Results   Component Value Date    GLUCOSE 88 07/18/2023    CALCIUM 10.4 07/18/2023     07/18/2023    K 4.5 07/18/2023    CO2 24.7 07/18/2023     07/18/2023    BUN 12 07/18/2023    CREATININE 1.23 (H) 07/18/2023    EGFRIFNONA 54 (L) 09/24/2021    BCR 9.8 " 07/18/2023    ANIONGAP 12.3 07/18/2023       Lab Results   Component Value Date    WBC 11.43 (H) 07/18/2023    HGB 14.7 07/18/2023    HCT 43.9 07/18/2023    MCV 90.1 07/18/2023     07/18/2023            Radiographic Studies  Narrative & Impression   PROCEDURE: XR ABDOMEN KUB-     HISTORY: Nephrolithiasis surveillance     COMPARISON: September 2016.     FINDINGS: An AP view of the abdomen and pelvis demonstrates an  unremarkable bowel gas pattern with no evidence of obstruction. A 12 mm  left renal midpole stone is unchanged from the prior exam. There are  multiple right upper quadrant surgical clips consistent with  cholecystectomy. Presumed calcified phleboliths are noted in the pelvis.     IMPRESSION:  Stable left nephrolithiasis.                       Images were reviewed, interpreted, and dictated by KAILA Louis  Transcribed by Venecia Paredes PA-C.           Assessment  53 y.o. female with history of nephrolithiasis s/p right URS/LL.    Patient's Litholink is actually quite low risk with excellent urine volumes, normal urine sodium, normal urine citrate and oxalate.  Litholink results are also reviewed by Dr. Gonzalez who agrees with this and actually recommends no intervention regarding pH alone.      +JESSICA; wears absorbent (period) underwear daily, would have to change clothes if not.    Coughing, sneezing, laughing causes at least moderate volume leakage.  Briefly discussed Bulkamid as an excellent option for stress incontinence and the patient is very interested.    Plan  1. FU in 2 weeks for JESSICA exam with full bladder

## 2023-10-09 ENCOUNTER — TRANSCRIBE ORDERS (OUTPATIENT)
Dept: ADMINISTRATIVE | Facility: HOSPITAL | Age: 53
End: 2023-10-09
Payer: MEDICAID

## 2023-10-09 DIAGNOSIS — Z12.31 SCREENING MAMMOGRAM FOR BREAST CANCER: Primary | ICD-10-CM

## 2023-10-10 ENCOUNTER — OFFICE VISIT (OUTPATIENT)
Dept: UROLOGY | Facility: CLINIC | Age: 53
End: 2023-10-10
Payer: MEDICAID

## 2023-10-10 VITALS
DIASTOLIC BLOOD PRESSURE: 76 MMHG | HEART RATE: 86 BPM | SYSTOLIC BLOOD PRESSURE: 110 MMHG | RESPIRATION RATE: 18 BRPM | WEIGHT: 185 LBS | BODY MASS INDEX: 31.58 KG/M2 | HEIGHT: 64 IN | OXYGEN SATURATION: 98 %

## 2023-10-10 DIAGNOSIS — N39.3 STRESS INCONTINENCE: Primary | ICD-10-CM

## 2023-10-10 NOTE — PROGRESS NOTES
"Chief Complaint   Patient presents with    Nephrolithiasis        HPI  Ms. Hill is a 53 y.o. female with history of nephrolithiasis s/p right URS/LL who presents for follow up.     At this visit, we will be examining for stress incontinence.    Past Medical History:   Diagnosis Date    Abdominal pain     Abnormal weight gain     Anxiety     Bipolar affective disorder     Cholelithiasis     removed    Colon polyp     had over 20 polyps    Constipation     Depression     Elevated cholesterol     Elevated white blood cell count     Excessive sweating     Hematuria     Hemorrhoids     Hiatal hernia     History of psoriasis     History of thyroid nodule     Hyperlipidemia     Hypertension     Kidney stone     Leukocytosis     Lumbago     Migraines     Nephrolithiasis     left    Ovarian cyst     left    Plantar fasciitis     Porphyria     Prediabetes     Sleep apnea     Snores     Tobacco use     Toe infection     Upper respiratory infection     Urinary incontinence 2021    Uses contact lenses     Vaginitis     Visit for screening mammogram        Past Surgical History:   Procedure Laterality Date    ABDOMINOPLASTY  2010    BREAST RECONSTRUCTION Bilateral 2010    Breast Lift     SECTION      x2    CHOLECYSTECTOMY      COLONOSCOPY      COLONOSCOPY N/A 10/26/2022    Procedure: COLONOSCOPY CPT CODE: 38452;  Surgeon: Lane Velazco MD;  Location: [facility] ENDOSCOPY;  Service: Gastroenterology;  Laterality: N/A;    ENDOMETRIAL ABLATION      ENDOSCOPY N/A 10/26/2022    Procedure: ESOPHAGOGASTRODUODENOSCOPY WITH BIOPSY CPT CODE: 10652;  Surgeon: Lane Velazco MD;  Location: [facility] ENDOSCOPY;  Service: Gastroenterology;  Laterality: N/A;    HEMORRHOIDECTOMY      had 3 removed    KIDNEY STONE SURGERY      LITHOTRIPSY  2018    TOE SURGERY      \"needle removed from toe\"    TONSILLECTOMY      TUBAL ABDOMINAL LIGATION      URETEROSCOPY LASER LITHOTRIPSY WITH STENT INSERTION " "Right 01/05/2023    Procedure: CYSTOSCOPY RETROGRADE PYELOGRAM AND URETEROSCOPY LASER LITHOTRIPSY WITH RIGHT STENT INSERTION;  Surgeon: Curry Gonzalez MD;  Location: [facility];  Service: Urology;  Laterality: Right;         Current Outpatient Medications:     buPROPion XL (WELLBUTRIN XL) 300 MG 24 hr tablet, Take 1 tablet by mouth Daily., Disp: , Rfl:     Cholecalciferol (Vitamin D) 50 MCG (2000 UT) tablet, 1 tablet Daily., Disp: , Rfl:     ezetimibe (ZETIA) 10 MG tablet, Take 1 tablet by mouth Daily., Disp: , Rfl:     famotidine (PEPCID) 40 MG tablet, Take 1 tablet by mouth Every Night., Disp: 90 tablet, Rfl: 3    FLUoxetine (PROzac) 40 MG capsule, , Disp: , Rfl:     Linzess 72 MCG capsule capsule, , Disp: , Rfl:     loratadine (CLARITIN) 10 MG tablet, , Disp: , Rfl:     losartan (COZAAR) 50 MG tablet, Take 1 tablet by mouth Daily., Disp: , Rfl:     Mounjaro 15 MG/0.5ML solution pen-injector, , Disp: , Rfl:     pantoprazole (PROTONIX) 40 MG EC tablet, Take 1 tablet by mouth Every Morning Before Breakfast., Disp: 90 tablet, Rfl: 3    Taltz 80 MG/ML solution auto-injector, , Disp: , Rfl:     Vraylar 1.5 MG capsule capsule, , Disp: , Rfl:      Physical Exam  Visit Vitals  /76 (BP Location: Left arm, Patient Position: Sitting, Cuff Size: Adult)   Pulse 86   Resp 18   Ht 162.6 cm (64.02\")   Wt 83.9 kg (185 lb)   SpO2 98%   BMI 31.74 kg/mý   : Normal vulva.  Urethra is normal and without masses.  Stress incontinence with Valsalva/cough.    Labs  Brief Urine Lab Results  (Last result in the past 365 days)        Color   Clarity   Blood   Leuk Est   Nitrite   Protein   CREAT   Urine HCG        09/22/23 0911 Yellow   Clear   2+   Negative   Negative   Negative                   Lab Results   Component Value Date    GLUCOSE 88 07/18/2023    CALCIUM 10.4 07/18/2023     07/18/2023    K 4.5 07/18/2023    CO2 24.7 07/18/2023     07/18/2023    BUN 12 07/18/2023    CREATININE 1.23 (H) 07/18/2023    " EGFRIFNONA 54 (L) 09/24/2021    BCR 9.8 07/18/2023    ANIONGAP 12.3 07/18/2023       Lab Results   Component Value Date    WBC 11.43 (H) 07/18/2023    HGB 14.7 07/18/2023    HCT 43.9 07/18/2023    MCV 90.1 07/18/2023     07/18/2023            Radiographic Studies  XR Abdomen KUB    Result Date: 9/22/2023  Stable left nephrolithiasis.        Images were reviewed, interpreted, and dictated by KAILA Louis Transcribed by Venecia Paredes PA-C.  This report was signed and finalized on 9/22/2023 7:00 PM by KAILA Wagoner.        I have reviewed the above labs and imaging.     Assessment  53 y.o. female with history of nephrolithiasis s/p right URS/LL.    Her exam confirms stress incontinence.  She has previously discussed Bulkamid with me and has no questions or concerns.  We discussed anticipated 2-week follow-up with [facility] following surgery.    Plan  1.  Consented for Bulkamid, schedule per Dr. Gonzalez

## 2023-10-11 DIAGNOSIS — N39.3 STRESS INCONTINENCE: Primary | ICD-10-CM

## 2023-10-31 ENCOUNTER — HOSPITAL ENCOUNTER (OUTPATIENT)
Dept: MAMMOGRAPHY | Facility: HOSPITAL | Age: 53
Discharge: HOME | End: 2023-10-31
Admitting: INTERNAL MEDICINE
Payer: MEDICAID

## 2023-10-31 DIAGNOSIS — Z12.31 SCREENING MAMMOGRAM FOR BREAST CANCER: ICD-10-CM

## 2023-10-31 PROCEDURE — 77063 BREAST TOMOSYNTHESIS BI: CPT

## 2023-10-31 PROCEDURE — 77067 SCR MAMMO BI INCL CAD: CPT

## 2023-11-16 ENCOUNTER — ANESTHESIA (OUTPATIENT)
Dept: PERIOP | Facility: HOSPITAL | Age: 53
End: 2023-11-16
Payer: MEDICAID

## 2023-11-16 ENCOUNTER — ANESTHESIA EVENT (OUTPATIENT)
Dept: PERIOP | Facility: HOSPITAL | Age: 53
End: 2023-11-16
Payer: MEDICAID

## 2023-11-16 ENCOUNTER — HOSPITAL ENCOUNTER (OUTPATIENT)
Facility: HOSPITAL | Age: 53
Setting detail: HOSPITAL OUTPATIENT SURGERY
Discharge: HOME | End: 2023-11-16
Attending: UROLOGY | Admitting: UROLOGY
Payer: MEDICAID

## 2023-11-16 VITALS
WEIGHT: 175 LBS | RESPIRATION RATE: 18 BRPM | BODY MASS INDEX: 29.88 KG/M2 | HEART RATE: 85 BPM | DIASTOLIC BLOOD PRESSURE: 80 MMHG | SYSTOLIC BLOOD PRESSURE: 109 MMHG | OXYGEN SATURATION: 100 % | TEMPERATURE: 97.5 F | HEIGHT: 64 IN

## 2023-11-16 DIAGNOSIS — N39.3 STRESS INCONTINENCE: ICD-10-CM

## 2023-11-16 LAB
B-HCG UR QL: NEGATIVE
EXPIRATION DATE: NORMAL
INTERNAL NEGATIVE CONTROL: NORMAL
INTERNAL POSITIVE CONTROL: NORMAL
Lab: NORMAL

## 2023-11-16 PROCEDURE — 25010000002 LIDOCAINE 1 % SOLUTION: Performed by: UROLOGY

## 2023-11-16 PROCEDURE — 25810000003 LACTATED RINGERS PER 1000 ML: Performed by: UROLOGY

## 2023-11-16 PROCEDURE — 25010000002 PROPOFOL 10 MG/ML EMULSION: Performed by: NURSE ANESTHETIST, CERTIFIED REGISTERED

## 2023-11-16 PROCEDURE — L8606 SYNTHETIC IMPLNT URINARY 1ML: HCPCS | Performed by: UROLOGY

## 2023-11-16 PROCEDURE — 25010000002 FENTANYL CITRATE (PF) 50 MCG/ML SOLUTION: Performed by: NURSE ANESTHETIST, CERTIFIED REGISTERED

## 2023-11-16 PROCEDURE — 25010000002 DEXAMETHASONE PER 1 MG: Performed by: NURSE ANESTHETIST, CERTIFIED REGISTERED

## 2023-11-16 PROCEDURE — 25010000002 ONDANSETRON PER 1 MG: Performed by: NURSE ANESTHETIST, CERTIFIED REGISTERED

## 2023-11-16 PROCEDURE — 81025 URINE PREGNANCY TEST: CPT | Performed by: UROLOGY

## 2023-11-16 PROCEDURE — 25010000002 CEFAZOLIN SODIUM-DEXTROSE 2-3 GM-%(50ML) RECONSTITUTED SOLUTION: Performed by: UROLOGY

## 2023-11-16 DEVICE — BULKAMID URETHRAL BULKING SYSTEM 2ML
Type: IMPLANTABLE DEVICE | Site: URETHRA | Status: FUNCTIONAL
Brand: BULKAMID

## 2023-11-16 RX ORDER — LIDOCAINE HCL/PF 100 MG/5ML
SYRINGE (ML) INJECTION AS NEEDED
Status: DISCONTINUED | OUTPATIENT
Start: 2023-11-16 | End: 2023-11-16 | Stop reason: SURG

## 2023-11-16 RX ORDER — LIDOCAINE HYDROCHLORIDE 10 MG/ML
INJECTION, SOLUTION INFILTRATION; PERINEURAL AS NEEDED
Status: DISCONTINUED | OUTPATIENT
Start: 2023-11-16 | End: 2023-11-16 | Stop reason: HOSPADM

## 2023-11-16 RX ORDER — PROPOFOL 10 MG/ML
VIAL (ML) INTRAVENOUS CONTINUOUS PRN
Status: DISCONTINUED | OUTPATIENT
Start: 2023-11-16 | End: 2023-11-16 | Stop reason: SURG

## 2023-11-16 RX ORDER — CEFAZOLIN SODIUM 2 G/50ML
2000 SOLUTION INTRAVENOUS ONCE
Status: COMPLETED | OUTPATIENT
Start: 2023-11-16 | End: 2023-11-16

## 2023-11-16 RX ORDER — ONDANSETRON 2 MG/ML
INJECTION INTRAMUSCULAR; INTRAVENOUS AS NEEDED
Status: DISCONTINUED | OUTPATIENT
Start: 2023-11-16 | End: 2023-11-16 | Stop reason: SURG

## 2023-11-16 RX ORDER — SODIUM CHLORIDE, SODIUM LACTATE, POTASSIUM CHLORIDE, CALCIUM CHLORIDE 600; 310; 30; 20 MG/100ML; MG/100ML; MG/100ML; MG/100ML
1000 INJECTION, SOLUTION INTRAVENOUS CONTINUOUS
Status: DISCONTINUED | OUTPATIENT
Start: 2023-11-16 | End: 2023-11-16 | Stop reason: HOSPADM

## 2023-11-16 RX ORDER — FENTANYL CITRATE 50 UG/ML
INJECTION, SOLUTION INTRAMUSCULAR; INTRAVENOUS AS NEEDED
Status: DISCONTINUED | OUTPATIENT
Start: 2023-11-16 | End: 2023-11-16 | Stop reason: SURG

## 2023-11-16 RX ORDER — DEXAMETHASONE SODIUM PHOSPHATE 4 MG/ML
INJECTION, SOLUTION INTRA-ARTICULAR; INTRALESIONAL; INTRAMUSCULAR; INTRAVENOUS; SOFT TISSUE AS NEEDED
Status: DISCONTINUED | OUTPATIENT
Start: 2023-11-16 | End: 2023-11-16 | Stop reason: SURG

## 2023-11-16 RX ADMIN — Medication 60 MG: at 11:44

## 2023-11-16 RX ADMIN — DEXAMETHASONE SODIUM PHOSPHATE 8 MG: 4 INJECTION INTRA-ARTICULAR; INTRALESIONAL; INTRAMUSCULAR; INTRAVENOUS; SOFT TISSUE at 11:49

## 2023-11-16 RX ADMIN — SODIUM CHLORIDE, POTASSIUM CHLORIDE, SODIUM LACTATE AND CALCIUM CHLORIDE 1000 ML: 600; 310; 30; 20 INJECTION, SOLUTION INTRAVENOUS at 10:56

## 2023-11-16 RX ADMIN — FENTANYL CITRATE 50 MCG: 50 INJECTION, SOLUTION INTRAMUSCULAR; INTRAVENOUS at 11:44

## 2023-11-16 RX ADMIN — CEFAZOLIN SODIUM 2000 MG: 2 SOLUTION INTRAVENOUS at 11:47

## 2023-11-16 RX ADMIN — PROPOFOL 180 MCG/KG/MIN: 10 INJECTION, EMULSION INTRAVENOUS at 11:44

## 2023-11-16 RX ADMIN — ONDANSETRON 4 MG: 2 INJECTION INTRAMUSCULAR; INTRAVENOUS at 11:42

## 2023-11-16 NOTE — ANESTHESIA PREPROCEDURE EVALUATION
Anesthesia Evaluation     Patient summary reviewed and Nursing notes reviewed   no history of anesthetic complications:   NPO Solid Status: > 8 hours  NPO Liquid Status: > 8 hours           Airway   Mallampati: II  TM distance: >3 FB  Neck ROM: full  Possible difficult intubation  Dental - normal exam     Pulmonary - normal exam   (+) a smoker Former,recent URI, sleep apnea  Cardiovascular - normal exam  Exercise tolerance: good (4-7 METS)    (+) hypertension, hyperlipidemia      Neuro/Psych  (+) headaches, psychiatric history Anxiety, Depression and Bipolar  GI/Hepatic/Renal/Endo    (+) obesity, hiatal hernia, GERD, GI bleeding , renal disease- stones, thyroid problem thyroid nodules    Musculoskeletal (-) negative ROS    Abdominal   (+) obese   Substance History - negative use  (-) drug use     OB/GYN negative ob/gyn ROS         Other        ROS/Med Hx Other: Porphyria                 Anesthesia Plan    ASA 3     MAC     (Risks and benefits discussed including risk of aspiration, recall and dental damage. All patient questions answered.    Will continue with plan of care.)  intravenous induction     Anesthetic plan, risks, benefits, and alternatives have been provided, discussed and informed consent has been obtained with: patient.  Pre-procedure education provided      CODE STATUS:

## 2023-11-16 NOTE — H&P
"CC  No chief complaint on file.       HPI  Ashleigh Hill is a 53 y.o. with history of No diagnosis found.     No recent fevers or new LUTS  Does not take any blood thinners    Past Medical History  Past Medical History:   Diagnosis Date    Abdominal pain     Abnormal weight gain     Anxiety     Bipolar affective disorder     Cholelithiasis     removed    Colon polyp     had over 20 polyps    Constipation     Depression     Elevated cholesterol     Elevated white blood cell count     Excessive sweating     Hematuria     Hemorrhoids     Hiatal hernia     History of psoriasis     History of thyroid nodule     Hyperlipidemia     Hypertension     Kidney stone     Leukocytosis     Lumbago     Migraines     Nephrolithiasis     left    Ovarian cyst     left    Plantar fasciitis     Porphyria     Prediabetes     Sleep apnea     mild no cpap    Snores     Thyroid nodule 2016    Tobacco use     Toe infection     Upper respiratory infection     Urinary incontinence 2021    Uses contact lenses     Vaginitis     Vaginitis 2016    Visit for screening mammogram        Past Surgical History  Past Surgical History:   Procedure Laterality Date    ABDOMINOPLASTY  2010    BREAST RECONSTRUCTION Bilateral 2010    Breast Lift     SECTION      x2    CHOLECYSTECTOMY      COLONOSCOPY      COLONOSCOPY N/A 10/26/2022    Procedure: COLONOSCOPY CPT CODE: 92444;  Surgeon: Lane Velazco MD;  Location: [facility] ENDOSCOPY;  Service: Gastroenterology;  Laterality: N/A;    ENDOMETRIAL ABLATION      ENDOSCOPY N/A 10/26/2022    Procedure: ESOPHAGOGASTRODUODENOSCOPY WITH BIOPSY CPT CODE: 31305;  Surgeon: Lane Velazco MD;  Location: [facility] ENDOSCOPY;  Service: Gastroenterology;  Laterality: N/A;    HEMORRHOIDECTOMY      had 3 removed    KIDNEY STONE SURGERY      LITHOTRIPSY  2018    TOE SURGERY      \"needle removed from toe\"    TONSILLECTOMY      TUBAL ABDOMINAL LIGATION      " "URETEROSCOPY LASER LITHOTRIPSY WITH STENT INSERTION Right 01/05/2023    Procedure: CYSTOSCOPY RETROGRADE PYELOGRAM AND URETEROSCOPY LASER LITHOTRIPSY WITH RIGHT STENT INSERTION;  Surgeon: Curry Gonzalez MD;  Location: [facility];  Service: Urology;  Laterality: Right;       Medications  No current facility-administered medications for this encounter.    Allergies  Allergies   Allergen Reactions    Bee Venom Anaphylaxis    Codeine Hives       Social History  Social History     Socioeconomic History    Marital status:    Tobacco Use    Smoking status: Former     Packs/day: 1.00     Years: 30.00     Additional pack years: 0.00     Total pack years: 30.00     Types: Cigarettes     Start date: 8/1/1985     Quit date: 11/1/2020     Years since quitting: 3.0    Smokeless tobacco: Never   Vaping Use    Vaping Use: Never used   Substance and Sexual Activity    Alcohol use: Not Currently     Comment: rarely 2 times per year    Drug use: Never    Sexual activity: Yes     Partners: Male     Birth control/protection: Tubal ligation       Review of Systems  Constitutional: No fevers or chills  Skin: Negative for rash  Endocrine: No heat/cold intolerance   Cardiovascular: Negative for chest pain or dyspnea on exertion  Respiratory: Negative for shortness of breath or wheezing  Gastrointestinal: No constipation, nausea or vomiting  Genitourinary: Negative for new lower urinary tract symptoms, current gross hematuria or dysuria.  Musculoskeletal: No flank pain  Neurological:  Negative for frequent headaches or dizziness  Lymph/Heme: Negative for leg swelling or calf pain.    Physical Exam  Visit Vitals  Ht 162.6 cm (64\")   Wt 79.4 kg (175 lb)   BMI 30.04 kg/m²     Constitutional: NAD, WDWN.   HEENT: NCAT. Conjunctivae normal.  MMM.    Cardiovascular: Regular rate.  Pulmonary/Chest: Respirations are even and unlabored bilaterally.  Abdominal: Soft. No distension, tenderness, masses or guarding. No CVA " tenderness.  Neurological: A + O x 3.  Cranial Nerves II-XII grossly intact. Normal gait.  Extremities: JOSLYN x 4, Warm. No clubbing.  No cyanosis.    Skin: Pink, warm and dry.  No rashes noted.  Psychiatric:  Normal mood and affect    Labs & Imaging  Lab Results   Component Value Date    GLUCOSE 88 07/18/2023    CALCIUM 10.4 07/18/2023     07/18/2023    K 4.5 07/18/2023    CO2 24.7 07/18/2023     07/18/2023    BUN 12 07/18/2023    CREATININE 1.23 (H) 07/18/2023    EGFRIFNONA 54 (L) 09/24/2021    BCR 9.8 07/18/2023    ANIONGAP 12.3 07/18/2023     Lab Results   Component Value Date    WBC 11.43 (H) 07/18/2023    HGB 14.7 07/18/2023    HCT 43.9 07/18/2023    MCV 90.1 07/18/2023     07/18/2023     Brief Urine Lab Results  (Last result in the past 365 days)        Color   Clarity   Blood   Leuk Est   Nitrite   Protein   CREAT   Urine HCG        09/22/23 0911 Yellow   Clear   2+   Negative   Negative   Negative                      Mammo Screening Digital Tomosynthesis Bilateral With CAD    Result Date: 11/2/2023  DIGITAL SCREENING MAMMOGRAM WITH TOMOSYNTHESIS  HISTORY: Routine screening. The patient has had an interval 15 pound weight loss.  IMAGE COMPARISON: 10/28/2022, 8/27/2021, 7/15/2019.  TECHNIQUE:  Low dose full field digital breast tomosynthesis imaging was performed with 2D and 3D acquisitions consisting of bilateral CC and MLO views.  FINDINGS: There are scattered areas of fibroglandular density. The fibroglandular pattern appears stable.  There is no mass, worrisome microcalcifications, or architectural distortion to suggest development of malignancy.      No findings suspicious for malignancy.  ACR BI-RADS CATEGORY:  1, NEGATIVE  RECOMMENDATION: Yearly mammogram, yearly clinical breast exam, and encourage self breast awareness.  CAD was used.  The standard false negative rate of mammography is between 10% and 25%. Complex patterns or increased breast density will markedly elevate the false  negative rate of mammography.  A letter, in lay terminology, with the results of this exam will be mailed to the patient.  At our facility, a triangular marker is positioned over a palpable area of concern indicated by the patient. A [place] marker is placed over a visible skin lesion. A linear marker indicates a scar.   If there is a palpable area of concern, biopsy should be considered regardless of imaging findings.    This report was finalized on 11/2/2023 2:08 PM by Dr. Marleen Cohen MD.            Assessment  Ashleigh Hill is a 53 y.o. female who presents with the following diagnosis:  No diagnosis found.     Plan  1. To OR for CYSTOSCOPY WITH BULKING AGENT INJECTION     Curry Gonzalez MD

## 2023-11-16 NOTE — DISCHARGE INSTRUCTIONS
Home Care After Bulking agent injection  The following instructions will help you care for yourself, or be cared for upon your return home today.    These are guidelines for your care right after surgery only.     Diet  Drink plenty of liquids and eat light meals today.    Start your regular diet today    Activity  Start normal activities in twenty-four (24) hours.    Wound Care and Hygiene  No restrictions, start normal routine.    Anesthesia Precautions & Expectations  After anesthesia, rest for 24 hours.    Do not drive, drink alcoholic beverages or make any important decisions during this time.    What to Expect after Surgery  Mild pain with voiding.  Frequency or urgency.  Bladder cramps.  Minimal bleeding with voiding.    Call your Doctor  Passing clots in urine preventing bladder emptying  Severe pain not controlled by oral medication  Temperature above 101.5 degrees  Inability to urinate within eight (8) hours after surgery    Other Contacts  Urology Office:  [id number]   [place], [place] [id number] [phone] office  [phone] fax    Follow up Appointment  Please call to schedule follow-up with Dr. Gonzalez's PA or NP in 2 weeks    No pushing, pulling, tugging,  heavy lifting, or strenuous activity.  No major decision making, driving, or drinking alcoholic beverages for 24 hours. ( due to the medications you have  received)  Always use good hand hygiene/washing techniques.  NO driving while taking pain medications.    * if you have an incision:  Check your incision area every day for signs of infection.   Check for:  * more redness, swelling, or pain  *more fluid or blood  *warmth  *pus or bad smellNo pushing, pulling, tugging,  heavy lifting, or strenuous activity.  No major decision making, driving, or drinking alcoholic beverages for 24 hours. ( due to the medications you have  received)  Always use good hand hygiene/washing techniques.  NO driving while taking pain medications.    * if you  have an incision:  Check your incision area every day for signs of infection.   Check for:  * more redness, swelling, or pain  *more fluid or blood  *warmth  *pus or bad smellTo assist you in voiding:  Drink plenty of fluids  Listen to running water while attempting to void.    If you are unable to urinate and you have an uncomfortable urge to void or it has been   6 hours since you were discharged, return to the Emergency RoomRest today  No pushing,pulling,tugging,heavy lifting, or strenuous activity   No major decision making,driving,or drinking alcoholic beverages for 24 hours due to the sedation you received  Always use good hand hygiene/washing technique  No driving on pain medication.

## 2023-11-16 NOTE — OP NOTE
Preoperative diagnosis  Stress urinary incontinence  Intrinsic sphincter Deficiency     Postoperative diagnosis  Same    Procedure performed  1.  Rigid cystoscopy  2.  Injection of Bulkamid bulking agent, 2 cc  (CPT 21140)     Attending surgeon  Curry Gonzalez MD    Anesthesia  Monitored anesthesia care    Complications  None    EBL  Minimal    Findings  Cystoscopy revealed no tumors, stones or other mucosal abnormalities.  At the end of the procedure there was excellent coaptation of the urethra    Indications  53 y.o. female agreed to undergo the above named procedure after discussion of the alternatives, risks and benefits.  Informed consent was obtained.      Procedure  The patient was taken to the operating room and placed supine on the operating table.  She was identified by name and medical record number.  Pre-operative antibiotics were administered.  Bilateral lower extremity SCDs were placed.  After induction of anesthesia the patient was positioned in dorsal lithotomy, prepped and draped in a sterile fashion.  A time-out was performed.      A rigid proprietary Bulkamid cystoscope was passed carefully via urethra into the bladder.  Formal cystoscopy was performed to assure there were no abnormalities within the bladder.  Then in the distal mid urethra we created for questions with the ball commit hydrogel by injecting at 2, 5, 7, and 10 o'clock. A total of 2 syringes were used for a total of 2 cc material.  After visualizing that there was excellent coaptation, the procedure was terminated.  We did leave some fluid within the bladder for the patient to perform a void trial later.  The patient tolerated the procedure well.     She will follow-up with me in 2 weeks for a postvoid residual.

## 2023-11-16 NOTE — ANESTHESIA POSTPROCEDURE EVALUATION
Patient: Ashleigh Hill    Procedure Summary       Date: 11/16/23 Room / Location: [facility] /  [facility]    Anesthesia Start: 1140 Anesthesia Stop: 1215    Procedure: CYSTOSCOPY WITH BULKING AGENT INJECTION Diagnosis:       Stress incontinence      (Stress incontinence [N39.3])    Surgeons: Curry Gonzalez MD Provider: Jeff Chowdhury CRNA    Anesthesia Type: MAC ASA Status: 3            Anesthesia Type: MAC    Vitals  Vitals Value Taken Time   /80 11/16/23 1243   Temp 97.5 °F (36.4 °C) 11/16/23 1218   Pulse 85 11/16/23 1243   Resp 18 11/16/23 1243   SpO2 100 % 11/16/23 1243           Post Anesthesia Care and Evaluation    Patient location during evaluation: bedside  Patient participation: complete - patient participated  Level of consciousness: awake and alert  Pain score: 0  Pain management: satisfactory to patient    Airway patency: patent  Anesthetic complications: No anesthetic complications  PONV Status: none  Cardiovascular status: acceptable and stable  Respiratory status: acceptable  Hydration status: acceptable    Comments: Vitals signs as noted in nursing documentation as per protocol.

## 2023-11-30 ENCOUNTER — OFFICE VISIT (OUTPATIENT)
Dept: UROLOGY | Facility: CLINIC | Age: 53
End: 2023-11-30
Payer: MEDICAID

## 2023-11-30 VITALS
BODY MASS INDEX: 29.88 KG/M2 | OXYGEN SATURATION: 96 % | DIASTOLIC BLOOD PRESSURE: 78 MMHG | WEIGHT: 175 LBS | HEART RATE: 96 BPM | SYSTOLIC BLOOD PRESSURE: 118 MMHG | TEMPERATURE: 98.1 F | HEIGHT: 64 IN

## 2023-11-30 DIAGNOSIS — N39.3 STRESS INCONTINENCE: Primary | ICD-10-CM

## 2023-11-30 DIAGNOSIS — N20.0 KIDNEY STONE: ICD-10-CM

## 2023-11-30 LAB
BILIRUB BLD-MCNC: NEGATIVE MG/DL
CLARITY, POC: CLEAR
COLOR UR: YELLOW
EXPIRATION DATE: ABNORMAL
GLUCOSE UR STRIP-MCNC: NEGATIVE MG/DL
KETONES UR QL: NEGATIVE
LEUKOCYTE EST, POC: NEGATIVE
Lab: ABNORMAL
NITRITE UR-MCNC: NEGATIVE MG/ML
PH UR: 6 [PH] (ref 5–8)
PROT UR STRIP-MCNC: NEGATIVE MG/DL
RBC # UR STRIP: ABNORMAL /UL
SP GR UR: 1.01 (ref 1–1.03)
UROBILINOGEN UR QL: NORMAL

## 2023-11-30 NOTE — PROGRESS NOTES
Chief Complaint   Patient presents with    Urinary Incontinence     2 week post bulkamid          HPI  Ms. Hill is a 53 y.o. female with history of nephrolithiasis s/p right URS/LL, JESSICA s/p Bulkamid 23 who presents for follow up.     At this visit, happy with results with Bulkamid. Denies any ongoing concerns or dysuria.    Past Medical History:   Diagnosis Date    Abdominal pain     Abnormal weight gain     Anxiety     Bipolar affective disorder     Cholelithiasis     removed    Colon polyp     had over 20 polyps    Constipation     Depression     Elevated cholesterol     Elevated white blood cell count     Excessive sweating     Hematuria     Hemorrhoids     Hiatal hernia     History of psoriasis     History of thyroid nodule     Hyperlipidemia     Hypertension     Kidney stone     Leukocytosis     Lumbago     Migraines     Nephrolithiasis     left    Ovarian cyst     left    Plantar fasciitis     Porphyria     Prediabetes     Sleep apnea     mild no cpap    Snores     Thyroid nodule 2016    Tobacco use     Toe infection     Upper respiratory infection     Urinary incontinence 2021    Uses contact lenses     Vaginitis     Vaginitis 2016    Visit for screening mammogram        Past Surgical History:   Procedure Laterality Date    ABDOMINOPLASTY  2010    BREAST RECONSTRUCTION Bilateral 2010    Breast Lift     SECTION      x2    CHOLECYSTECTOMY      COLONOSCOPY      COLONOSCOPY N/A 10/26/2022    Procedure: COLONOSCOPY CPT CODE: 83281;  Surgeon: Lane Velazco MD;  Location: [facility] ENDOSCOPY;  Service: Gastroenterology;  Laterality: N/A;    CYSTOSCOPY W/ BULKING AGENT INJECTION N/A 2023    Procedure: CYSTOSCOPY WITH BULKING AGENT INJECTION;  Surgeon: Curry Gonzalez MD;  Location: [facility] OR;  Service: Urology;  Laterality: N/A;    ENDOMETRIAL ABLATION      ENDOSCOPY N/A 10/26/2022    Procedure: ESOPHAGOGASTRODUODENOSCOPY WITH BIOPSY CPT CODE:  "79705;  Surgeon: Lane Velazco MD;  Location: [facility] ENDOSCOPY;  Service: Gastroenterology;  Laterality: N/A;    HEMORRHOIDECTOMY  2018    had 3 removed    KIDNEY STONE SURGERY      LITHOTRIPSY  2018    TOE SURGERY      \"needle removed from toe\"    TONSILLECTOMY      TUBAL ABDOMINAL LIGATION  1993    URETEROSCOPY LASER LITHOTRIPSY WITH STENT INSERTION Right 01/05/2023    Procedure: CYSTOSCOPY RETROGRADE PYELOGRAM AND URETEROSCOPY LASER LITHOTRIPSY WITH RIGHT STENT INSERTION;  Surgeon: Curry Gonzalez MD;  Location: [facility] OR;  Service: Urology;  Laterality: Right;         Current Outpatient Medications:     buPROPion XL (WELLBUTRIN XL) 300 MG 24 hr tablet, Take 1 tablet by mouth Daily., Disp: , Rfl:     Cholecalciferol (Vitamin D) 50 MCG (2000 UT) tablet, 1 tablet Daily., Disp: , Rfl:     ezetimibe (ZETIA) 10 MG tablet, Take 1 tablet by mouth Daily., Disp: , Rfl:     FLUoxetine (PROzac) 40 MG capsule, Take  by mouth Daily., Disp: , Rfl:     Linzess 72 MCG capsule capsule, Take  by mouth Every Morning Before Breakfast., Disp: , Rfl:     loratadine (CLARITIN) 10 MG tablet, Take  by mouth Daily., Disp: , Rfl:     losartan (COZAAR) 50 MG tablet, Take 1 tablet by mouth Daily., Disp: , Rfl:     Mounjaro 15 MG/0.5ML solution pen-injector, 1 (One) Time Per Week., Disp: , Rfl:     pantoprazole (PROTONIX) 40 MG EC tablet, Take 1 tablet by mouth Every Morning Before Breakfast., Disp: 90 tablet, Rfl: 3    Taltz 80 MG/ML solution auto-injector, Every 30 (Thirty) Days., Disp: , Rfl:     Vraylar 1.5 MG capsule capsule, Take  by mouth Daily., Disp: , Rfl:     famotidine (PEPCID) 40 MG tablet, Take 1 tablet by mouth Every Night. (Patient not taking: Reported on 11/13/2023), Disp: 90 tablet, Rfl: 3     Physical Exam  Visit Vitals  /78 (BP Location: Left arm, Patient Position: Sitting, Cuff Size: Adult)   Pulse 96   Temp 98.1 °F (36.7 °C) (Temporal)   Ht 162.6 cm (64\")   Wt 79.4 kg (175 lb)   SpO2 96%   BMI 30.04 " "kg/m²       Labs  Brief Urine Lab Results  (Last result in the past 365 days)        Color   Clarity   Blood   Leuk Est   Nitrite   Protein   CREAT   Urine HCG        11/30/23 0912 Yellow   Clear   2+   Negative   Negative   Negative                   Lab Results   Component Value Date    GLUCOSE 88 07/18/2023    CALCIUM 10.4 07/18/2023     07/18/2023    K 4.5 07/18/2023    CO2 24.7 07/18/2023     07/18/2023    BUN 12 07/18/2023    CREATININE 1.23 (H) 07/18/2023    EGFRIFNONA 54 (L) 09/24/2021    BCR 9.8 07/18/2023    ANIONGAP 12.3 07/18/2023       Lab Results   Component Value Date    WBC 11.43 (H) 07/18/2023    HGB 14.7 07/18/2023    HCT 43.9 07/18/2023    MCV 90.1 07/18/2023     07/18/2023     PVR  Post-void residual performed with ultrasound scanner by staff and interpreted by me - [facility]       Radiographic Studies  XR Abdomen KUB    Result Date: 9/22/2023  Stable left nephrolithiasis.        Images were reviewed, interpreted, and dictated by KAILA Louis Transcribed by Venecia Paredes PA-C.  This report was signed and finalized on 9/22/2023 7:00 PM by KAILA Wagoner.        I have reviewed the above labs and imaging.     Assessment  53 y.o. female with history of nephrolithiasis s/p right URS/LL, JESSICA s/p Bulkamid 11/16/23.    As above, patient is very pleased with her results. Denies any stress incontinence or ongoing concerns. PVR scant.     We reviewed her stone burden, large embedded left nephrolithiasis only by CT in April of 2023. Regarding RF, noted 09/2023: \"Patient's Litholink is actually quite low risk with excellent urine volumes, normal urine sodium, normal urine citrate and oxalate.  Litholink results are also reviewed by Dr. Gonzalez who agrees with this and actually recommends no intervention regarding pH alone.\"    Plan  1.  Nephrolithiasis   -Follow-up in 1 year with KUB prior   -Obtain urine micro to quantify hematuria  2.  JESSICA, resolved  -Pt reminded bulkamid " lasts 5-7 years and can be repeated as desired

## 2023-12-01 LAB
APPEARANCE UR: CLEAR
BACTERIA #/AREA URNS HPF: ABNORMAL /HPF
BILIRUB UR QL STRIP: NEGATIVE
CASTS URNS MICRO: ABNORMAL
COLOR UR: YELLOW
EPI CELLS #/AREA URNS HPF: ABNORMAL /HPF
GLUCOSE UR QL STRIP: NEGATIVE
HGB UR QL STRIP: NEGATIVE
KETONES UR QL STRIP: NEGATIVE
LEUKOCYTE ESTERASE UR QL STRIP: NEGATIVE
NITRITE UR QL STRIP: NEGATIVE
PH UR STRIP: 6 [PH] (ref 5–8)
PROT UR QL STRIP: NEGATIVE
RBC #/AREA URNS HPF: ABNORMAL /HPF
SP GR UR STRIP: 1.01 (ref 1–1.03)
UROBILINOGEN UR STRIP-MCNC: NORMAL MG/DL
WBC #/AREA URNS HPF: ABNORMAL /HPF

## 2024-01-16 ENCOUNTER — OFFICE VISIT (OUTPATIENT)
Dept: GASTROENTEROLOGY | Facility: CLINIC | Age: 54
End: 2024-01-16
Payer: MEDICAID

## 2024-01-16 VITALS
WEIGHT: 178 LBS | HEART RATE: 106 BPM | DIASTOLIC BLOOD PRESSURE: 80 MMHG | SYSTOLIC BLOOD PRESSURE: 112 MMHG | BODY MASS INDEX: 30.55 KG/M2 | OXYGEN SATURATION: 98 %

## 2024-01-16 DIAGNOSIS — K21.9 GASTROESOPHAGEAL REFLUX DISEASE WITHOUT ESOPHAGITIS: Primary | ICD-10-CM

## 2024-01-16 DIAGNOSIS — K59.04 CHRONIC IDIOPATHIC CONSTIPATION: ICD-10-CM

## 2024-01-16 DIAGNOSIS — K31.84 GASTROPARESIS: ICD-10-CM

## 2024-01-16 PROCEDURE — 1159F MED LIST DOCD IN RCRD: CPT | Performed by: PHYSICIAN ASSISTANT

## 2024-01-16 PROCEDURE — 1160F RVW MEDS BY RX/DR IN RCRD: CPT | Performed by: PHYSICIAN ASSISTANT

## 2024-01-16 PROCEDURE — 99214 OFFICE O/P EST MOD 30 MIN: CPT | Performed by: PHYSICIAN ASSISTANT

## 2024-01-16 RX ORDER — PANTOPRAZOLE SODIUM 40 MG/1
40 TABLET, DELAYED RELEASE ORAL
Qty: 90 TABLET | Refills: 3 | Status: CANCELLED | OUTPATIENT
Start: 2024-01-16

## 2024-01-16 RX ORDER — FAMOTIDINE 40 MG/1
TABLET, FILM COATED ORAL
COMMUNITY
Start: 2023-12-05

## 2024-01-16 RX ORDER — LINACLOTIDE 72 UG/1
72 CAPSULE, GELATIN COATED ORAL
Qty: 90 CAPSULE | Refills: 3 | Status: SHIPPED | OUTPATIENT
Start: 2024-01-16

## 2024-01-16 NOTE — PROGRESS NOTES
Follow Up Note     Date: 2024   Patient Name: Ashleigh Hill  MRN: [id number]  : 1970     Primary Care Provider: Livan Boston MD     Chief Complaint   Patient presents with    Constipation     History of present illness:   2024  Ashleigh Hill is a 53 y.o. female who is here today for follow up regarding Constipation.    Still on Mounjaro. Has lost more than 20 lbs since last visit. Feeling well today. Heartburn and constipation improved. No current belching, bloating or abdominal pain. She has been taking protonix 40 mg once daily for GERD plus Linzess 72 mcg once daily for constipation with good improvements in GI complaints.     Interval History:  2022  She has been having rectal bleeding for the past 3-4 months, it is bright red in color. Blood is in the stool, on the tissue and in the toilet water. Bleeding only occurs with a BM. She has been told in the past that she has hemorrhodis. She has had a hemorrhoid surgery in the past approx 5 years. She has constipation which aggravates the bleeding. BMs are occurring every 2-3 days and hard with straining. Has tried taking dulcolax, miralax without relief. Also has associated bloating.      Having nausea with vomiting 2-3 times per week. This started approx 3 weeks ago. This is occurring mostly in the mornings, not after eating. She tried taking her medications with food as a way to help with this but she has had vomiting even before taking her medications. Has increased belching. No heartburn. No current reflux medications. She does wake up at night with sensation that something is caught in her throat. No dysphagia. Had EGD several years ago, was told she had a small hiatal hernia, completed by Dr. Landry.      Last colonoscopy was 7-8 years ago and reports having over 20 colon polyps removed then. There is no known family history of colon cancer. Mother had colon polyps.     Subjective     Past Medical History:  "  Diagnosis Date    Abdominal pain     Abnormal weight gain     Anxiety     Bipolar affective disorder     Cholelithiasis     removed    Colon polyp     had over 20 polyps    Constipation     Depression     Elevated cholesterol     Elevated white blood cell count     Excessive sweating     Hematuria     Hemorrhoids     Hiatal hernia     History of psoriasis     History of thyroid nodule     Hyperlipidemia     Hypertension     Kidney stone     Leukocytosis     Lumbago     Migraines     Nephrolithiasis     left    Ovarian cyst     left    Plantar fasciitis     Porphyria     Prediabetes     Sleep apnea     mild no cpap    Snores     Thyroid nodule 2016    Tobacco use     Toe infection     Upper respiratory infection     Urinary incontinence 2021    Uses contact lenses     Vaginitis     Vaginitis 2016    Visit for screening mammogram      Past Surgical History:   Procedure Laterality Date    ABDOMINOPLASTY  2010    BREAST RECONSTRUCTION Bilateral 2010    Breast Lift     SECTION      x2    CHOLECYSTECTOMY      COLONOSCOPY      COLONOSCOPY N/A 10/26/2022    Procedure: COLONOSCOPY CPT CODE: 42855;  Surgeon: Lane Velazco MD;  Location: [facility] ENDOSCOPY;  Service: Gastroenterology;  Laterality: N/A;    CYSTOSCOPY W/ BULKING AGENT INJECTION N/A 2023    Procedure: CYSTOSCOPY WITH BULKING AGENT INJECTION;  Surgeon: Curry Gonzalez MD;  Location: [facility] OR;  Service: Urology;  Laterality: N/A;    ENDOMETRIAL ABLATION      ENDOSCOPY N/A 10/26/2022    Procedure: ESOPHAGOGASTRODUODENOSCOPY WITH BIOPSY CPT CODE: 35872;  Surgeon: Lane Velazco MD;  Location: [facility] ENDOSCOPY;  Service: Gastroenterology;  Laterality: N/A;    HEMORRHOIDECTOMY      had 3 removed    KIDNEY STONE SURGERY      LITHOTRIPSY  2018    TOE SURGERY      \"needle removed from toe\"    TONSILLECTOMY      TUBAL ABDOMINAL LIGATION      URETEROSCOPY LASER LITHOTRIPSY WITH STENT " INSERTION Right 01/05/2023    Procedure: CYSTOSCOPY RETROGRADE PYELOGRAM AND URETEROSCOPY LASER LITHOTRIPSY WITH RIGHT STENT INSERTION;  Surgeon: Curry Gonzalez MD;  Location: [facility];  Service: Urology;  Laterality: Right;     Family History   Problem Relation Age of Onset    Arthritis Mother     Diabetes Mother     Hypertension Mother     Osteoporosis Mother     Thyroid disease Mother     Hyperlipidemia Mother     Colon polyps Mother     Cancer Father     Kidney disease Father     Breast cancer Maternal Aunt     Colon cancer Neg Hx     Ovarian cancer Neg Hx      Social History     Socioeconomic History    Marital status:    Tobacco Use    Smoking status: Former     Packs/day: 1.00     Years: 30.00     Additional pack years: 0.00     Total pack years: 30.00     Types: Cigarettes     Start date: 8/1/1985     Quit date: 11/1/2020     Years since quitting: 3.2    Smokeless tobacco: Never   Vaping Use    Vaping Use: Never used   Substance and Sexual Activity    Alcohol use: Not Currently     Comment: rarely 2 times per year    Drug use: Never    Sexual activity: Yes     Partners: Male     Birth control/protection: Tubal ligation     Current Outpatient Medications:     buPROPion XL (WELLBUTRIN XL) 300 MG 24 hr tablet, Take 1 tablet by mouth Daily., Disp: , Rfl:     Cholecalciferol (Vitamin D) 50 MCG (2000 UT) tablet, 1 tablet Daily., Disp: , Rfl:     ezetimibe (ZETIA) 10 MG tablet, Take 1 tablet by mouth Daily., Disp: , Rfl:     FLUoxetine (PROzac) 40 MG capsule, Take  by mouth Daily., Disp: , Rfl:     Linzess 72 MCG capsule capsule, Take 1 capsule by mouth Every Morning Before Breakfast., Disp: 90 capsule, Rfl: 3    loratadine (CLARITIN) 10 MG tablet, Take  by mouth Daily., Disp: , Rfl:     losartan (COZAAR) 50 MG tablet, Take 1 tablet by mouth Daily., Disp: , Rfl:     Mounjaro 15 MG/0.5ML solution pen-injector, 1 (One) Time Per Week., Disp: , Rfl:     pantoprazole (PROTONIX) 40 MG EC tablet, Take 1  tablet by mouth Every Morning Before Breakfast., Disp: 90 tablet, Rfl: 3    Taltz 80 MG/ML solution auto-injector, Every 30 (Thirty) Days., Disp: , Rfl:     Vraylar 1.5 MG capsule capsule, Take  by mouth Daily., Disp: , Rfl:     famotidine (PEPCID) 40 MG tablet, , Disp: , Rfl:     Allergies   Allergen Reactions    Bee Venom Anaphylaxis    Codeine Hives     The following portions of the patient's history were reviewed and updated as appropriate: allergies, current medications, past family history, past medical history, past social history, past surgical history and problem list.    Objective     Physical Exam  Constitutional:       General: She is not in acute distress.     Appearance: Normal appearance. She is well-developed. She is not diaphoretic.   HENT:      Head: Normocephalic and atraumatic.      Right Ear: External ear normal.      Left Ear: External ear normal.      Nose: Nose normal.   Eyes:      General: No scleral icterus.        Right eye: No discharge.         Left eye: No discharge.      Conjunctiva/sclera: Conjunctivae normal.   Neck:      Trachea: No tracheal deviation.   Pulmonary:      Effort: Pulmonary effort is normal. No respiratory distress.   Musculoskeletal:         General: Normal range of motion.      Cervical back: Normal range of motion.   Skin:     Coloration: Skin is not pale.      Findings: No erythema or rash.   Neurological:      Mental Status: She is alert and oriented to person, place, and time.      Coordination: Coordination normal.   Psychiatric:         Mood and Affect: Mood normal.         Behavior: Behavior normal.         Thought Content: Thought content normal.         Judgment: Judgment normal.       Vitals:    01/16/24 0912   BP: 112/80   Pulse: 106   SpO2: 98%   Weight: 80.7 kg (178 lb)     Results Review:   I reviewed the patient's new clinical results.    CT Abdomen Pelvis Stone Protocol  Result Date: 12/21/2022  1. Moderate right hydronephrosis and hydroureter  secondary to a mid-distal right ureteral stone measuring 4 x 6 mm. 2. Dominant nonobstructing left renal stone.      EGD and colonoscopy by Dr. Velazco 10/26/2022:  - The oropharynx was normal.  - The Z-line was regular and was found 35 cm from the incisors.  - No gross lesions were noted in the proximal esophagus. Biopsies were taken with a cold forceps for histology.  - Bilious fluid was found in the stomach.  - Striped moderately erythematous mucosa without bleeding was found on the posterior wall of the stomach, in the gastric antrum and in the prepyloric region of the stomach. Biopsies were taken with a cold forceps for Helicobacter pylori testing.  - The duodenal bulb, first portion of the duodenum, second portion of the duodenum and third portion of the duodenum were normal. Biopsies for histology were taken with a cold forceps for evaluation of celiac disease.  - Hemorrhoids were found on perianal exam.  - A 3 mm polyp was found in the cecum. The polyp was sessile. The polyp was removed with a cold snare. Resection and retrieval were complete.  - A 4 mm polyp was found in the hepatic flexure. The polyp was sessile. The polyp was removed with a cold snare. Resection and retrieval were complete.  - A 3 mm polyp was found in the recto-sigmoid colon. The polyp was sessile. The polyp was removed with a cold snare. Resection and retrieval were complete.  - A few small-mouthed diverticula were found in the sigmoid colon.  - Non-bleeding external and internal hemorrhoids were found. The hemorrhoids were moderate and medium-sized.  - The terminal ileum appeared normal. Biopsies were taken with a cold forceps for histology for change in bowel habit.  - Biopsies were taken with a cold forceps in the sigmoid colon, in the descending colon, in the transverse colon, in the ascending colon and in the cecum for histology.  Pathology DIAGNOSIS:   A.   SMALL BOWEL, BIOPSIES:   Benign duodenal mucosa with no diagnostic  abnormality   B.   GASTRIC ANTRUM BIOPSIES:   Reactive gastropathy   C.   DISTAL ESOPHAGUS, BIOPSIES:   Mildly reactive squamous mucosa   D.   ILEUM, RANDOM BIOPSY:   Benign ileal mucosa with no diagnostic abnormality   E.   CECUM, POLYP, BIOPSIES:   Benign colonic mucosa with no diagnostic abnormality   F.   TRANSVERSE COLON, RANDOM BIOPSIES:   Benign colonic mucosa with no diagnostic abnormality   G.   COLON, HEPATIC FLEXURE, POLYP, BIOPSIES:   Tubular adenoma   H.   RECTOSIGMOID POLYP, BIOPSIES:   Tubular adenoma      CT Abdomen Pelvis Without Contrast  Result Date: 4/17/2023  Impression: 1. No acute process seen within the abdomen or pelvis. 2. Incidental left adrenal adenoma. 3. Nonobstructing left renal stone.     Assessment / Plan      1. Gastroesophageal reflux disease without esophagitis  2. Gastroparesis  3. Chronic idiopathic constipation  Had prior symptoms of gradually worsening nausea, belching, bloating and severe reflux at night, all resolved now. Still taking PPI daily with good control of reflux. Has not been needing to take pepcid at night. Taking Linzess 72 mcg daily for constipation with BMs daily. Based on history, she had drug induced gastroparesis (mounjaro). Previous EGD 10/2022 did show bilious fluid in the stomach, no celiac or H pylori on biopsy. CTAP 4/2023 no acute findings, showed constipation on my review.      Low fiber diet when severe symptoms present  Ok to take low dose reglan, only sparingly PRN nausea  Continue PPI daily  Could consider gastric emptying study in the future if symptoms return  Continue Linzess 72 mcg once daily, refill sent    - Linzess 72 MCG capsule capsule; Take 1 capsule by mouth Every Morning Before Breakfast.  Dispense: 90 capsule; Refill: 3    Prior history  Tubular adenoma of colon  Colonoscopy  showed 3 colon polyps which were small in size, all were tubular adenomas without high-grade dysplasia. She will need a repeat colonoscopy in 5 years, due  2027.    Follow Up:   Return in about 1 year (around 1/16/2025) for recheck constipation, recheck GERD.    Polly Pyle PA-C  [facility]  1/16/2024  10:02 EST    Dictated Utilizing Dragon Dictation: Part of this note may be an electronic transcription/translation of spoken language to printed text using the Dragon Dictation System.

## 2024-01-24 DIAGNOSIS — K59.04 CHRONIC IDIOPATHIC CONSTIPATION: ICD-10-CM

## 2024-01-24 RX ORDER — LINACLOTIDE 72 UG/1
72 CAPSULE, GELATIN COATED ORAL
Qty: 90 CAPSULE | Refills: 3 | OUTPATIENT
Start: 2024-01-24

## 2024-07-26 ENCOUNTER — TRANSCRIBE ORDERS (OUTPATIENT)
Dept: ADMINISTRATIVE | Facility: HOSPITAL | Age: 54
End: 2024-07-26
Payer: MEDICAID

## 2024-07-26 DIAGNOSIS — Z12.2 SCREENING FOR LUNG CANCER: Primary | ICD-10-CM

## 2024-07-26 DIAGNOSIS — Z87.891 PERSONAL HISTORY OF TOBACCO USE, PRESENTING HAZARDS TO HEALTH: ICD-10-CM

## 2024-08-03 ENCOUNTER — HOSPITAL ENCOUNTER (EMERGENCY)
Facility: HOSPITAL | Age: 54
Discharge: HOME | End: 2024-08-03
Attending: STUDENT IN AN ORGANIZED HEALTH CARE EDUCATION/TRAINING PROGRAM
Payer: MEDICAID

## 2024-08-03 VITALS
DIASTOLIC BLOOD PRESSURE: 85 MMHG | BODY MASS INDEX: 29.02 KG/M2 | OXYGEN SATURATION: 98 % | HEART RATE: 96 BPM | SYSTOLIC BLOOD PRESSURE: 120 MMHG | TEMPERATURE: 98.1 F | RESPIRATION RATE: 18 BRPM | WEIGHT: 170 LBS | HEIGHT: 64 IN

## 2024-08-03 DIAGNOSIS — I10 HYPERTENSION, UNSPECIFIED TYPE: Primary | ICD-10-CM

## 2024-08-03 LAB
ALBUMIN SERPL-MCNC: 4.9 G/DL (ref 3.5–5.2)
ALBUMIN/GLOB SERPL: 1.7 G/DL
ALP SERPL-CCNC: 110 U/L (ref 39–117)
ALT SERPL W P-5'-P-CCNC: 19 U/L (ref 1–33)
ANION GAP SERPL CALCULATED.3IONS-SCNC: 11.9 MMOL/L (ref 5–15)
AST SERPL-CCNC: 17 U/L (ref 1–32)
BASOPHILS # BLD AUTO: 0.08 10*3/MM3 (ref 0–0.2)
BASOPHILS NFR BLD AUTO: 0.5 % (ref 0–1.5)
BILIRUB SERPL-MCNC: 0.3 MG/DL (ref 0–1.2)
BUN SERPL-MCNC: 14 MG/DL (ref 6–20)
BUN/CREAT SERPL: 13.3 (ref 7–25)
CALCIUM SPEC-SCNC: 10.2 MG/DL (ref 8.6–10.5)
CHLORIDE SERPL-SCNC: 102 MMOL/L (ref 98–107)
CO2 SERPL-SCNC: 25.1 MMOL/L (ref 22–29)
CREAT SERPL-MCNC: 1.05 MG/DL (ref 0.57–1)
DEPRECATED RDW RBC AUTO: 41 FL (ref 37–54)
EGFRCR SERPLBLD CKD-EPI 2021: 62.9 ML/MIN/1.73
EOSINOPHIL # BLD AUTO: 0.21 10*3/MM3 (ref 0–0.4)
EOSINOPHIL NFR BLD AUTO: 1.4 % (ref 0.3–6.2)
ERYTHROCYTE [DISTWIDTH] IN BLOOD BY AUTOMATED COUNT: 12.3 % (ref 12.3–15.4)
GLOBULIN UR ELPH-MCNC: 2.9 GM/DL
GLUCOSE SERPL-MCNC: 119 MG/DL (ref 65–99)
HCT VFR BLD AUTO: 44 % (ref 34–46.6)
HGB BLD-MCNC: 14.9 G/DL (ref 12–15.9)
IMM GRANULOCYTES # BLD AUTO: 0.05 10*3/MM3 (ref 0–0.05)
IMM GRANULOCYTES NFR BLD AUTO: 0.3 % (ref 0–0.5)
LYMPHOCYTES # BLD AUTO: 3.26 10*3/MM3 (ref 0.7–3.1)
LYMPHOCYTES NFR BLD AUTO: 21.8 % (ref 19.6–45.3)
MAGNESIUM SERPL-MCNC: 2 MG/DL (ref 1.6–2.6)
MCH RBC QN AUTO: 30.7 PG (ref 26.6–33)
MCHC RBC AUTO-ENTMCNC: 33.9 G/DL (ref 31.5–35.7)
MCV RBC AUTO: 90.5 FL (ref 79–97)
MONOCYTES # BLD AUTO: 0.66 10*3/MM3 (ref 0.1–0.9)
MONOCYTES NFR BLD AUTO: 4.4 % (ref 5–12)
NEUTROPHILS NFR BLD AUTO: 10.72 10*3/MM3 (ref 1.7–7)
NEUTROPHILS NFR BLD AUTO: 71.6 % (ref 42.7–76)
NRBC BLD AUTO-RTO: 0 /100 WBC (ref 0–0.2)
PLATELET # BLD AUTO: 401 10*3/MM3 (ref 140–450)
PMV BLD AUTO: 9.2 FL (ref 6–12)
POTASSIUM SERPL-SCNC: 4.2 MMOL/L (ref 3.5–5.2)
PROT SERPL-MCNC: 7.8 G/DL (ref 6–8.5)
RBC # BLD AUTO: 4.86 10*6/MM3 (ref 3.77–5.28)
SODIUM SERPL-SCNC: 139 MMOL/L (ref 136–145)
TROPONIN T SERPL HS-MCNC: <6 NG/L
TSH SERPL DL<=0.05 MIU/L-ACNC: 0.36 UIU/ML (ref 0.27–4.2)
WBC NRBC COR # BLD AUTO: 14.98 10*3/MM3 (ref 3.4–10.8)

## 2024-08-03 PROCEDURE — 84443 ASSAY THYROID STIM HORMONE: CPT | Performed by: STUDENT IN AN ORGANIZED HEALTH CARE EDUCATION/TRAINING PROGRAM

## 2024-08-03 PROCEDURE — 83735 ASSAY OF MAGNESIUM: CPT | Performed by: STUDENT IN AN ORGANIZED HEALTH CARE EDUCATION/TRAINING PROGRAM

## 2024-08-03 PROCEDURE — 25010000002 LORAZEPAM PER 2 MG: Performed by: STUDENT IN AN ORGANIZED HEALTH CARE EDUCATION/TRAINING PROGRAM

## 2024-08-03 PROCEDURE — 85025 COMPLETE CBC W/AUTO DIFF WBC: CPT | Performed by: STUDENT IN AN ORGANIZED HEALTH CARE EDUCATION/TRAINING PROGRAM

## 2024-08-03 PROCEDURE — 84484 ASSAY OF TROPONIN QUANT: CPT | Performed by: STUDENT IN AN ORGANIZED HEALTH CARE EDUCATION/TRAINING PROGRAM

## 2024-08-03 PROCEDURE — 99283 EMERGENCY DEPT VISIT LOW MDM: CPT

## 2024-08-03 PROCEDURE — 96374 THER/PROPH/DIAG INJ IV PUSH: CPT

## 2024-08-03 PROCEDURE — 93005 ELECTROCARDIOGRAM TRACING: CPT | Performed by: STUDENT IN AN ORGANIZED HEALTH CARE EDUCATION/TRAINING PROGRAM

## 2024-08-03 PROCEDURE — 80053 COMPREHEN METABOLIC PANEL: CPT | Performed by: STUDENT IN AN ORGANIZED HEALTH CARE EDUCATION/TRAINING PROGRAM

## 2024-08-03 PROCEDURE — 25810000003 SODIUM CHLORIDE 0.9 % SOLUTION: Performed by: STUDENT IN AN ORGANIZED HEALTH CARE EDUCATION/TRAINING PROGRAM

## 2024-08-03 RX ORDER — LORAZEPAM 2 MG/ML
0.5 INJECTION INTRAMUSCULAR ONCE
Status: COMPLETED | OUTPATIENT
Start: 2024-08-03 | End: 2024-08-03

## 2024-08-03 RX ORDER — LOSARTAN POTASSIUM 50 MG/1
50 TABLET ORAL ONCE
Status: COMPLETED | OUTPATIENT
Start: 2024-08-03 | End: 2024-08-03

## 2024-08-03 RX ADMIN — LORAZEPAM 0.5 MG: 2 INJECTION, SOLUTION INTRAMUSCULAR; INTRAVENOUS at 11:23

## 2024-08-03 RX ADMIN — LOSARTAN POTASSIUM 50 MG: 50 TABLET, FILM COATED ORAL at 12:47

## 2024-08-03 RX ADMIN — SODIUM CHLORIDE 1000 ML: 9 INJECTION, SOLUTION INTRAVENOUS at 11:23

## 2024-08-03 NOTE — ED PROVIDER NOTES
[facility] EMERGENCY DEPARTMENT  Emergency Department Encounter  Emergency Medicine Physician Note       Pt Name: Ashleigh Hill  MRN: [id number]  Pt :   1970  Room Number:    Date of encounter:  8/3/2024  PCP: Charisma Aparicio APRN  ED Provider: Willy Joe MD    Historian: Patient      HPI:  Chief Complaint: Elevated blood pressure reading        Context: Ashleigh Hill is a 54 y.o. female who presents to the ED for elevated blood pressure reading.  Patient reports she checked her blood pressure this morning and it was severely elevated.  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  She states because of this she has been neglecting herself and has not taken her losartan in the past week.  She reports feeling stressed but denies other symptoms.  Denies chest pain or shortness of breath.  She feels her heart beating fast currently but attributed to stress and dehydration potentially.  Denies abdominal pain.  No lightheadedness.  No headaches or weakness.      PAST MEDICAL HISTORY  Past Medical History:   Diagnosis Date    Abdominal pain     Abnormal weight gain     Anxiety     Bipolar affective disorder     Cholelithiasis     removed    Colon polyp     had over 20 polyps    Constipation     Depression     Elevated cholesterol     Elevated white blood cell count     Excessive sweating     Hematuria     Hemorrhoids     Hiatal hernia     History of psoriasis     History of thyroid nodule     Hyperlipidemia     Hypertension     Kidney stone     Leukocytosis     Lumbago     Migraines     Nephrolithiasis     left    Ovarian cyst     left    Plantar fasciitis     Porphyria     Prediabetes     Sleep apnea     mild no cpap    Snores     Thyroid nodule 2016    Tobacco use     Toe infection     Upper respiratory infection     Urinary incontinence 2021    Uses contact lenses     Vaginitis      "Vaginitis 2016    Visit for screening mammogram          PAST SURGICAL HISTORY  Past Surgical History:   Procedure Laterality Date    ABDOMINOPLASTY  2010    BREAST RECONSTRUCTION Bilateral 2010    Breast Lift     SECTION      x2    CHOLECYSTECTOMY      COLONOSCOPY      COLONOSCOPY N/A 10/26/2022    Procedure: COLONOSCOPY CPT CODE: 73632;  Surgeon: Lane Velazco MD;  Location: [facility] ENDOSCOPY;  Service: Gastroenterology;  Laterality: N/A;    CYSTOSCOPY W/ BULKING AGENT INJECTION N/A 2023    Procedure: CYSTOSCOPY WITH BULKING AGENT INJECTION;  Surgeon: Curry Gonzalez MD;  Location: [facility] OR;  Service: Urology;  Laterality: N/A;    ENDOMETRIAL ABLATION      ENDOSCOPY N/A 10/26/2022    Procedure: ESOPHAGOGASTRODUODENOSCOPY WITH BIOPSY CPT CODE: 24921;  Surgeon: Lane Velazco MD;  Location: [facility] ENDOSCOPY;  Service: Gastroenterology;  Laterality: N/A;    HEMORRHOIDECTOMY  2018    had 3 removed    KIDNEY STONE SURGERY      LITHOTRIPSY  2018    TOE SURGERY      \"needle removed from toe\"    TONSILLECTOMY      TUBAL ABDOMINAL LIGATION  1993    URETEROSCOPY LASER LITHOTRIPSY WITH STENT INSERTION Right 2023    Procedure: CYSTOSCOPY RETROGRADE PYELOGRAM AND URETEROSCOPY LASER LITHOTRIPSY WITH RIGHT STENT INSERTION;  Surgeon: Curry Gonzalez MD;  Location: [facility] OR;  Service: Urology;  Laterality: Right;         FAMILY HISTORY  Family History   Problem Relation Age of Onset    Arthritis Mother     Diabetes Mother     Hypertension Mother     Osteoporosis Mother     Thyroid disease Mother     Hyperlipidemia Mother     Colon polyps Mother     Cancer Father     Kidney disease Father     Breast cancer Maternal Aunt     Colon cancer Neg Hx     Ovarian cancer Neg Hx          SOCIAL HISTORY  Social History     Socioeconomic History    Marital status:    Tobacco Use    Smoking status: Former     Current packs/day: 0.00     Average packs/day: 1 pack/day for " 35.3 years (35.3 ttl pk-yrs)     Types: Cigarettes     Start date: 8/1/1985     Quit date: 11/1/2020     Years since quitting: 3.7    Smokeless tobacco: Never   Vaping Use    Vaping status: Never Used   Substance and Sexual Activity    Alcohol use: Not Currently     Comment: rarely 2 times per year    Drug use: Never    Sexual activity: Yes     Partners: Male     Birth control/protection: Tubal ligation         ALLERGIES  Bee venom and Codeine        REVIEW OF SYSTEMS  Systems reviewed and negative      PHYSICAL EXAM    I have reviewed the triage vital signs and nursing notes.    ED Triage Vitals [08/03/24 1042]   Temp Heart Rate Resp BP SpO2   98.1 °F (36.7 °C) (!) 130 20 (!) 177/106 98 %      Temp src Heart Rate Source Patient Position BP Location FiO2 (%)   Oral Monitor Sitting Left arm --       Physical Exam  Constitutional:       General: She is not in acute distress.  Cardiovascular:      Rate and Rhythm: Regular rhythm. Tachycardia present.      Pulses: Normal pulses.      Heart sounds: No murmur heard.  Pulmonary:      Effort: Pulmonary effort is normal. No respiratory distress.      Breath sounds: No wheezing or rales.   Abdominal:      General: There is no distension.      Palpations: Abdomen is soft.      Tenderness: There is no abdominal tenderness.   Skin:     General: Skin is warm.   Neurological:      General: No focal deficit present.      Mental Status: She is alert and oriented to person, place, and time.      Sensory: No sensory deficit.      Motor: No weakness.   Psychiatric:         Mood and Affect: Mood is anxious.       LAB RESULTS  Recent Results (from the past 24 hour(s))   Single High Sensitivity Troponin T    Collection Time: 08/03/24 11:02 AM    Specimen: Blood   Result Value Ref Range    HS Troponin T <6 <14 ng/L   Comprehensive Metabolic Panel    Collection Time: 08/03/24 11:02 AM    Specimen: Blood   Result Value Ref Range    Glucose 119 (H) 65 - 99 mg/dL    BUN 14 6 - 20 mg/dL     Creatinine 1.05 (H) 0.57 - 1.00 mg/dL    Sodium 139 136 - 145 mmol/L    Potassium 4.2 3.5 - 5.2 mmol/L    Chloride 102 98 - 107 mmol/L    CO2 25.1 22.0 - 29.0 mmol/L    Calcium 10.2 8.6 - 10.5 mg/dL    Total Protein 7.8 6.0 - 8.5 g/dL    Albumin 4.9 3.5 - 5.2 g/dL    ALT (SGPT) 19 1 - 33 U/L    AST (SGOT) 17 1 - 32 U/L    Alkaline Phosphatase 110 39 - 117 U/L    Total Bilirubin 0.3 0.0 - 1.2 mg/dL    Globulin 2.9 gm/dL    A/G Ratio 1.7 g/dL    BUN/Creatinine Ratio 13.3 7.0 - 25.0    Anion Gap 11.9 5.0 - 15.0 mmol/L    eGFR 62.9 >60.0 mL/min/1.73   CBC Auto Differential    Collection Time: 08/03/24 11:02 AM    Specimen: Blood   Result Value Ref Range    WBC 14.98 (H) 3.40 - 10.80 10*3/mm3    RBC 4.86 3.77 - 5.28 10*6/mm3    Hemoglobin 14.9 12.0 - 15.9 g/dL    Hematocrit 44.0 34.0 - 46.6 %    MCV 90.5 79.0 - 97.0 fL    MCH 30.7 26.6 - 33.0 pg    MCHC 33.9 31.5 - 35.7 g/dL    RDW 12.3 12.3 - 15.4 %    RDW-SD 41.0 37.0 - 54.0 fl    MPV 9.2 6.0 - 12.0 fL    Platelets 401 140 - 450 10*3/mm3    Neutrophil % 71.6 42.7 - 76.0 %    Lymphocyte % 21.8 19.6 - 45.3 %    Monocyte % 4.4 (L) 5.0 - 12.0 %    Eosinophil % 1.4 0.3 - 6.2 %    Basophil % 0.5 0.0 - 1.5 %    Immature Grans % 0.3 0.0 - 0.5 %    Neutrophils, Absolute 10.72 (H) 1.70 - 7.00 10*3/mm3    Lymphocytes, Absolute 3.26 (H) 0.70 - 3.10 10*3/mm3    Monocytes, Absolute 0.66 0.10 - 0.90 10*3/mm3    Eosinophils, Absolute 0.21 0.00 - 0.40 10*3/mm3    Basophils, Absolute 0.08 0.00 - 0.20 10*3/mm3    Immature Grans, Absolute 0.05 0.00 - 0.05 10*3/mm3    nRBC 0.0 0.0 - 0.2 /100 WBC   Magnesium    Collection Time: 08/03/24 11:02 AM    Specimen: Blood   Result Value Ref Range    Magnesium 2.0 1.6 - 2.6 mg/dL   TSH    Collection Time: 08/03/24 11:02 AM    Specimen: Blood   Result Value Ref Range    TSH 0.361 0.270 - 4.200 uIU/mL       If labs were ordered, I independently reviewed the results and considered them in treating the patient.        RADIOLOGY  No Radiology Exams  Resulted Within Past 24 Hours    PROCEDURES    Procedures    ECG 12 Lead Other; hypertension   Final Result          MEDICATIONS GIVEN IN ER    Medications   sodium chloride 0.9 % bolus 1,000 mL (0 mL Intravenous Stopped 8/3/24 1309)   LORazepam (ATIVAN) injection 0.5 mg (0.5 mg Intravenous Given 8/3/24 1123)   losartan (COZAAR) tablet 50 mg (50 mg Oral Given 8/3/24 1247)         MEDICAL DECISION MAKING, PROGRESS, and CONSULTS    All labs, if obtained, have been independently reviewed by me.  All radiology studies, if obtained, have been reviewed by me and the radiologist dictating the report.  All EKG's, if obtained, have been independently viewed and interpreted by me.      Discussion below represents my analysis of pertinent findings related to patient's condition, differential diagnosis, treatment plan and final disposition.                         Differential diagnosis:    Arrhythmia, ACS, electrolyte abnormality, thyroid disorder, dehydration, TRIP, others.      Additional sources:    - Discussed/ obtained information from independent historians: Family member at bedside    - External (non-ED) record review: Outpatient progress note 1/16/2024    - Chronic or social conditions impacting care:      - Shared decision making:        Orders placed during this visit:  Orders Placed This Encounter   Procedures    Single High Sensitivity Troponin T    Comprehensive Metabolic Panel    CBC Auto Differential    Magnesium    TSH    ECG 12 Lead Other; hypertension    CBC & Differential         Additional orders considered but not ordered:      ED Course/MDM Discussion:    Patient is a 54-year-old female presenting for elevated blood pressure reading and stress/anxiety.  She arrived to the emergency department hypertensive and tachycardic.  She has no other associated symptoms with hypertensive emergency namely no chest pain no severe headache no shortness of breath and no abdominal pain.  She has been noncompliant with her  losartan for the past week which is likely contributing.  Basic labs obtained demonstrated mild leukocytosis nonspecific no critical anemia her electrolytes appear normal her TSH is normal.  EKG demonstrated sinus tachycardia with normal axis and normal QTc she has some rate related changes however no obvious STEMI criteria my interpretation.  Her troponin was undetectable and given that she is not having chest pain I have a low clinical suspicion for ACS.  She was given IV hydration in addition to anxiolytic to good effect.  After these interventions her tachycardia had normalized and her blood pressure had normalized most recent was 120/87.  On reassessment she reported symptom improvement.  Recommendation for close outpatient follow-up, blood pressure log, stress relief activities at home, medication compliance, and strict return precautions.                Consultants:      Shared Decision Making:  After my consideration of clinical presentation and any laboratory/radiology studies obtained, I discussed the findings with the patient/patient representative who is in agreement with the treatment plan and the final disposition.   Risks and benefits of discharge and/or observation/admission were discussed.         AS OF 14:47 EDT VITALS:    BP - 120/85  HR - 96  TEMP - 98.1 °F (36.7 °C) (Oral)  O2 SATS - 98%                  DIAGNOSIS  Final diagnoses:   Hypertension, unspecified type         DISPOSITION  ED Disposition       ED Disposition   Discharge    Condition   Stable    Comment   --                   Please note that portions of this document were completed with voice recognition software.        Willy Joe MD  08/03/24 3114

## 2024-08-04 ENCOUNTER — APPOINTMENT (OUTPATIENT)
Dept: GENERAL RADIOLOGY | Facility: HOSPITAL | Age: 54
End: 2024-08-04
Payer: MEDICAID

## 2024-08-04 ENCOUNTER — HOSPITAL ENCOUNTER (EMERGENCY)
Facility: HOSPITAL | Age: 54
Discharge: HOME | End: 2024-08-04
Attending: EMERGENCY MEDICINE | Admitting: EMERGENCY MEDICINE
Payer: MEDICAID

## 2024-08-04 VITALS
BODY MASS INDEX: 29.02 KG/M2 | DIASTOLIC BLOOD PRESSURE: 88 MMHG | TEMPERATURE: 98.3 F | SYSTOLIC BLOOD PRESSURE: 120 MMHG | HEIGHT: 64 IN | OXYGEN SATURATION: 94 % | WEIGHT: 170 LBS | RESPIRATION RATE: 18 BRPM | HEART RATE: 103 BPM

## 2024-08-04 DIAGNOSIS — I10 HYPERTENSION, UNSPECIFIED TYPE: Primary | ICD-10-CM

## 2024-08-04 DIAGNOSIS — R00.0 TACHYCARDIA: ICD-10-CM

## 2024-08-04 LAB
ALBUMIN SERPL-MCNC: 4.7 G/DL (ref 3.5–5.2)
ALBUMIN/GLOB SERPL: 1.8 G/DL
ALP SERPL-CCNC: 103 U/L (ref 39–117)
ALT SERPL W P-5'-P-CCNC: 18 U/L (ref 1–33)
AMORPH URATE CRY URNS QL MICRO: ABNORMAL /HPF
ANION GAP SERPL CALCULATED.3IONS-SCNC: 11.4 MMOL/L (ref 5–15)
AST SERPL-CCNC: 15 U/L (ref 1–32)
BACTERIA UR QL AUTO: ABNORMAL /HPF
BASOPHILS # BLD AUTO: 0.06 10*3/MM3 (ref 0–0.2)
BASOPHILS NFR BLD AUTO: 0.4 % (ref 0–1.5)
BILIRUB SERPL-MCNC: 0.3 MG/DL (ref 0–1.2)
BILIRUB UR QL STRIP: NEGATIVE
BUN SERPL-MCNC: 17 MG/DL (ref 6–20)
BUN/CREAT SERPL: 14.7 (ref 7–25)
CALCIUM SPEC-SCNC: 10.5 MG/DL (ref 8.6–10.5)
CHLORIDE SERPL-SCNC: 105 MMOL/L (ref 98–107)
CLARITY UR: ABNORMAL
CO2 SERPL-SCNC: 24.6 MMOL/L (ref 22–29)
COLOR UR: YELLOW
CREAT SERPL-MCNC: 1.16 MG/DL (ref 0.57–1)
DEPRECATED RDW RBC AUTO: 41 FL (ref 37–54)
EGFRCR SERPLBLD CKD-EPI 2021: 55.8 ML/MIN/1.73
EOSINOPHIL # BLD AUTO: 0.29 10*3/MM3 (ref 0–0.4)
EOSINOPHIL NFR BLD AUTO: 1.9 % (ref 0.3–6.2)
ERYTHROCYTE [DISTWIDTH] IN BLOOD BY AUTOMATED COUNT: 12.5 % (ref 12.3–15.4)
GLOBULIN UR ELPH-MCNC: 2.6 GM/DL
GLUCOSE SERPL-MCNC: 97 MG/DL (ref 65–99)
GLUCOSE UR STRIP-MCNC: NEGATIVE MG/DL
HCT VFR BLD AUTO: 44.1 % (ref 34–46.6)
HGB BLD-MCNC: 14.8 G/DL (ref 12–15.9)
HGB UR QL STRIP.AUTO: NEGATIVE
HOLD SPECIMEN: NORMAL
HOLD SPECIMEN: NORMAL
HYALINE CASTS UR QL AUTO: ABNORMAL /LPF
IMM GRANULOCYTES # BLD AUTO: 0.06 10*3/MM3 (ref 0–0.05)
IMM GRANULOCYTES NFR BLD AUTO: 0.4 % (ref 0–0.5)
KETONES UR QL STRIP: NEGATIVE
LEUKOCYTE ESTERASE UR QL STRIP.AUTO: ABNORMAL
LYMPHOCYTES # BLD AUTO: 4.45 10*3/MM3 (ref 0.7–3.1)
LYMPHOCYTES NFR BLD AUTO: 29.2 % (ref 19.6–45.3)
MCH RBC QN AUTO: 30.2 PG (ref 26.6–33)
MCHC RBC AUTO-ENTMCNC: 33.6 G/DL (ref 31.5–35.7)
MCV RBC AUTO: 90 FL (ref 79–97)
MONOCYTES # BLD AUTO: 0.82 10*3/MM3 (ref 0.1–0.9)
MONOCYTES NFR BLD AUTO: 5.4 % (ref 5–12)
NEUTROPHILS NFR BLD AUTO: 62.7 % (ref 42.7–76)
NEUTROPHILS NFR BLD AUTO: 9.58 10*3/MM3 (ref 1.7–7)
NITRITE UR QL STRIP: NEGATIVE
NRBC BLD AUTO-RTO: 0 /100 WBC (ref 0–0.2)
PH UR STRIP.AUTO: 6.5 [PH] (ref 5–8)
PLATELET # BLD AUTO: 393 10*3/MM3 (ref 140–450)
PMV BLD AUTO: 9.2 FL (ref 6–12)
POTASSIUM SERPL-SCNC: 3.9 MMOL/L (ref 3.5–5.2)
PROT SERPL-MCNC: 7.3 G/DL (ref 6–8.5)
PROT UR QL STRIP: NEGATIVE
RBC # BLD AUTO: 4.9 10*6/MM3 (ref 3.77–5.28)
RBC # UR STRIP: ABNORMAL /HPF
REF LAB TEST METHOD: ABNORMAL
SODIUM SERPL-SCNC: 141 MMOL/L (ref 136–145)
SP GR UR STRIP: 1.01 (ref 1–1.03)
SQUAMOUS #/AREA URNS HPF: ABNORMAL /HPF
UROBILINOGEN UR QL STRIP: ABNORMAL
WBC # UR STRIP: ABNORMAL /HPF
WBC NRBC COR # BLD AUTO: 15.26 10*3/MM3 (ref 3.4–10.8)
WHOLE BLOOD HOLD COAG: NORMAL
WHOLE BLOOD HOLD SPECIMEN: NORMAL

## 2024-08-04 PROCEDURE — 25810000003 SODIUM CHLORIDE 0.9 % SOLUTION: Performed by: PHYSICIAN ASSISTANT

## 2024-08-04 PROCEDURE — 81001 URINALYSIS AUTO W/SCOPE: CPT | Performed by: PHYSICIAN ASSISTANT

## 2024-08-04 PROCEDURE — 85025 COMPLETE CBC W/AUTO DIFF WBC: CPT | Performed by: PHYSICIAN ASSISTANT

## 2024-08-04 PROCEDURE — 93005 ELECTROCARDIOGRAM TRACING: CPT | Performed by: PHYSICIAN ASSISTANT

## 2024-08-04 PROCEDURE — 87086 URINE CULTURE/COLONY COUNT: CPT | Performed by: PHYSICIAN ASSISTANT

## 2024-08-04 PROCEDURE — 99284 EMERGENCY DEPT VISIT MOD MDM: CPT

## 2024-08-04 PROCEDURE — 80053 COMPREHEN METABOLIC PANEL: CPT | Performed by: PHYSICIAN ASSISTANT

## 2024-08-04 PROCEDURE — 71045 X-RAY EXAM CHEST 1 VIEW: CPT

## 2024-08-04 RX ORDER — CEPHALEXIN 500 MG/1
500 CAPSULE ORAL 4 TIMES DAILY
Qty: 28 CAPSULE | Refills: 0 | Status: SHIPPED | OUTPATIENT
Start: 2024-08-04 | End: 2024-08-11

## 2024-08-04 RX ADMIN — SODIUM CHLORIDE 1000 ML: 9 INJECTION, SOLUTION INTRAVENOUS at 17:55

## 2024-08-04 NOTE — ED PROVIDER NOTES
Subjective:  Chief Complaint:  Hypertension    History of Present Illness:  Patient is a 54-year-old female with history of anxiety, bipolar affective disorder, depression, hypercholesterolemia, hyperlipidemia, hypertension, among others presenting to the ER for the second day in a row for hypertension.  Patient recently did not take her blood pressure medication for 5 days as she states that she realized she had lifted out of her pill clement.  She has since resumed taking it but states that her blood pressure was 140s over 100 today.  Denies any symptoms other than sweating.  Upon arrival to the ER, blood pressure was 119/95.  She denies any chest pain, abdominal pain, vision changes, neurologic symptoms, or any additional symptoms or complaints at this time.  Heart rate is noted to be elevated in the low 100s, 110-119 during evaluation.  Patient denies additional symptoms or complaints at this time.  Patient had nonconcerning workup yesterday with normal labs, troponin, EKG, TSH, electrolytes.  Patient did have a leukocytosis which is nonspecific and creatinine was 1.05 which is almost normal.  Otherwise workup was unremarkable, blood pressure improved after dose of home medication and patient was discharged.      Nurses Notes reviewed and agree, including vitals, allergies, social history and prior medical history.     REVIEW OF SYSTEMS: All systems reviewed and not pertinent unless noted.  Review of Systems   Cardiovascular:         Elevated heart rate and blood pressure   All other systems reviewed and are negative.      Past Medical History:   Diagnosis Date    Abdominal pain     Abnormal weight gain     Anxiety     Bipolar affective disorder     Cholelithiasis 2011    removed    Colon polyp 2015    had over 20 polyps    Constipation     Depression     Elevated cholesterol     Elevated white blood cell count     Excessive sweating     Hematuria     Hemorrhoids     Hiatal hernia     History of psoriasis      "History of thyroid nodule     Hyperlipidemia     Hypertension     Kidney stone     Leukocytosis     Lumbago     Migraines     Nephrolithiasis     left    Ovarian cyst     left    Plantar fasciitis     Porphyria     Prediabetes     Sleep apnea     mild no cpap    Snores     Thyroid nodule 2016    Tobacco use     Toe infection     Upper respiratory infection     Urinary incontinence 2021    Uses contact lenses     Vaginitis     Vaginitis 2016    Visit for screening mammogram        Allergies:    Bee venom and Codeine      Past Surgical History:   Procedure Laterality Date    ABDOMINOPLASTY  2010    BREAST RECONSTRUCTION Bilateral 2010    Breast Lift     SECTION      x2    CHOLECYSTECTOMY      COLONOSCOPY      COLONOSCOPY N/A 10/26/2022    Procedure: COLONOSCOPY CPT CODE: 44779;  Surgeon: Lane Velazco MD;  Location: [facility] ENDOSCOPY;  Service: Gastroenterology;  Laterality: N/A;    CYSTOSCOPY W/ BULKING AGENT INJECTION N/A 2023    Procedure: CYSTOSCOPY WITH BULKING AGENT INJECTION;  Surgeon: Curry Gonzalez MD;  Location: [facility] OR;  Service: Urology;  Laterality: N/A;    ENDOMETRIAL ABLATION      ENDOSCOPY N/A 10/26/2022    Procedure: ESOPHAGOGASTRODUODENOSCOPY WITH BIOPSY CPT CODE: 11942;  Surgeon: Lane Velazco MD;  Location: [facility] ENDOSCOPY;  Service: Gastroenterology;  Laterality: N/A;    HEMORRHOIDECTOMY  2018    had 3 removed    KIDNEY STONE SURGERY      LITHOTRIPSY  2018    TOE SURGERY      \"needle removed from toe\"    TONSILLECTOMY      TUBAL ABDOMINAL LIGATION      URETEROSCOPY LASER LITHOTRIPSY WITH STENT INSERTION Right 2023    Procedure: CYSTOSCOPY RETROGRADE PYELOGRAM AND URETEROSCOPY LASER LITHOTRIPSY WITH RIGHT STENT INSERTION;  Surgeon: Curry Gonzalez MD;  Location: [facility] OR;  Service: Urology;  Laterality: Right;         Social History     Socioeconomic History    Marital status:    Tobacco Use    Smoking " "status: Former     Current packs/day: 0.00     Average packs/day: 1 pack/day for 35.3 years (35.3 ttl pk-yrs)     Types: Cigarettes     Start date: 8/1/1985     Quit date: 11/1/2020     Years since quitting: 3.7    Smokeless tobacco: Never   Vaping Use    Vaping status: Never Used   Substance and Sexual Activity    Alcohol use: Not Currently     Comment: rarely 2 times per year    Drug use: Never    Sexual activity: Yes     Partners: Male     Birth control/protection: Tubal ligation         Family History   Problem Relation Age of Onset    Arthritis Mother     Diabetes Mother     Hypertension Mother     Osteoporosis Mother     Thyroid disease Mother     Hyperlipidemia Mother     Colon polyps Mother     Cancer Father     Kidney disease Father     Breast cancer Maternal Aunt     Colon cancer Neg Hx     Ovarian cancer Neg Hx        Objective  Physical Exam:  /88   Pulse 103   Temp 98.3 °F (36.8 °C)   Resp 18   Ht 162.6 cm (64\")   Wt 77.1 kg (170 lb)   SpO2 94%   BMI 29.18 kg/m²      Physical Exam  Vitals and nursing note reviewed.   Constitutional:       General: She is not in acute distress.     Appearance: She is not toxic-appearing.   HENT:      Head: Normocephalic and atraumatic.      Right Ear: External ear normal.      Left Ear: External ear normal.      Nose: Nose normal.   Eyes:      Extraocular Movements: Extraocular movements intact.      Conjunctiva/sclera: Conjunctivae normal.   Cardiovascular:      Rate and Rhythm: Tachycardia present.      Heart sounds: Normal heart sounds.   Pulmonary:      Effort: Pulmonary effort is normal. No respiratory distress.      Breath sounds: Normal breath sounds.   Abdominal:      General: There is no distension.      Palpations: Abdomen is soft.      Tenderness: There is no abdominal tenderness.   Musculoskeletal:         General: Normal range of motion.      Cervical back: Normal range of motion and neck supple.   Skin:     General: Skin is warm and dry. "   Neurological:      General: No focal deficit present.      Mental Status: She is alert and oriented to person, place, and time.   Psychiatric:         Mood and Affect: Mood normal.         Behavior: Behavior normal.         Procedures    ED Course:    ED Course as of 08/04/24 1957   Sun Aug 04, 2024   1759 EKG: I reviewed and independently interpreted the EKG as:  Sinus tach, rate of 115, normal axis, normal intervals, no st elevation, no t wave inversions   [CS]      ED Course User Index  [CS] Mateo Spencer MD       Lab Results (last 24 hours)       Procedure Component Value Units Date/Time    CBC & Differential [[id number]]  (Abnormal) Collected: 08/04/24 1738    Specimen: Blood Updated: 08/04/24 1756    Narrative:      The following orders were created for panel order CBC & Differential.  Procedure                               Abnormality         Status                     ---------                               -----------         ------                     CBC Auto Differential[[id number]]        Abnormal            Final result                 Please view results for these tests on the individual orders.    Comprehensive Metabolic Panel [[id number]]  (Abnormal) Collected: 08/04/24 1738    Specimen: Blood Updated: 08/04/24 1809     Glucose 97 mg/dL      BUN 17 mg/dL      Creatinine 1.16 mg/dL      Sodium 141 mmol/L      Potassium 3.9 mmol/L      Chloride 105 mmol/L      CO2 24.6 mmol/L      Calcium 10.5 mg/dL      Total Protein 7.3 g/dL      Albumin 4.7 g/dL      ALT (SGPT) 18 U/L      AST (SGOT) 15 U/L      Alkaline Phosphatase 103 U/L      Total Bilirubin 0.3 mg/dL      Globulin 2.6 gm/dL      A/G Ratio 1.8 g/dL      BUN/Creatinine Ratio 14.7     Anion Gap 11.4 mmol/L      eGFR 55.8 mL/min/1.73     Narrative:      GFR Normal >60  Chronic Kidney Disease <60  Kidney Failure <15      CBC Auto Differential [[id number]]  (Abnormal) Collected: 08/04/24 1738    Specimen: Blood Updated: 08/04/24 1756      WBC 15.26 10*3/mm3      RBC 4.90 10*6/mm3      Hemoglobin 14.8 g/dL      Hematocrit 44.1 %      MCV 90.0 fL      MCH 30.2 pg      MCHC 33.6 g/dL      RDW 12.5 %      RDW-SD 41.0 fl      MPV 9.2 fL      Platelets 393 10*3/mm3      Neutrophil % 62.7 %      Lymphocyte % 29.2 %      Monocyte % 5.4 %      Eosinophil % 1.9 %      Basophil % 0.4 %      Immature Grans % 0.4 %      Neutrophils, Absolute 9.58 10*3/mm3      Lymphocytes, Absolute 4.45 10*3/mm3      Monocytes, Absolute 0.82 10*3/mm3      Eosinophils, Absolute 0.29 10*3/mm3      Basophils, Absolute 0.06 10*3/mm3      Immature Grans, Absolute 0.06 10*3/mm3      nRBC 0.0 /100 WBC     Urinalysis With Microscopic If Indicated (No Culture) - Urine, Clean Catch [[id number]]  (Abnormal) Collected: 08/04/24 1920    Specimen: Urine, Clean Catch Updated: 08/04/24 1926     Color, UA Yellow     Appearance, UA Cloudy     pH, UA 6.5     Specific Gravity, UA 1.015     Glucose, UA Negative     Ketones, UA Negative     Bilirubin, UA Negative     Blood, UA Negative     Protein, UA Negative     Leuk Esterase, UA Trace     Nitrite, UA Negative     Urobilinogen, UA 0.2 E.U./dL    Urinalysis, Microscopic Only - Urine, Clean Catch [[id number]]  (Abnormal) Collected: 08/04/24 1920    Specimen: Urine, Clean Catch Updated: 08/04/24 1947     RBC, UA None Seen /HPF      WBC, UA 0-2 /HPF      Bacteria, UA Trace /HPF      Squamous Epithelial Cells, UA 0-2 /HPF      Hyaline Casts, UA None Seen /LPF      Amorphous Crystals, UA Small/1+ /HPF      Methodology Manual Light Microscopy    Urinalysis With Culture If Indicated - Urine, Clean Catch [[id number]] Collected: 08/04/24 1920    Specimen: Urine, Clean Catch Updated: 08/04/24 1957    Urine Culture - Urine, Urine, Clean Catch [[id number]] Collected: 08/04/24 1920    Specimen: Urine, Clean Catch Updated: 08/04/24 1957             No radiology results from the last 24 hrs       MDM     Amount and/or Complexity of Data Reviewed  Decide to obtain  previous medical records or to obtain history from someone other than the patient: yes  Independent visualization of images, tracings, or specimens: yes    Patient evaluated in the ER for tachycardia and hypertension.  She is hemodynamically stable upon arrival though is slightly tachycardic with heart rate ranging from 110-119 during evaluation.  Patient does note she has not drink many fluids today.  Differential diagnosis includes but is not limited to dehydration, electrolyte abnormalities, kidney dysfunction, cardiac arrhythmia, among others.  Initial plan includes CBC, CMP, EKG, 1 L IV fluids, monitoring heart rate during ER visit.  Discussed with patient that it may take some time for her blood pressure to regulate given that she missed several doses last week.  Also discussed proper blood pressure technique of sitting still for several minutes and ensuring proper cuff placement.  Discussed that she should keep a blood pressure log to follow-up with her PCP and be sure that she is taking her blood pressure medication at the same time every day.  Also discussed the DASH diet.    Labs remarkable for leukocytosis which appears to be chronic but is slightly worsened from yesterday.  Given this, I added a chest x-ray and urinalysis to rule out infection.  Creatinine is slightly elevated from yesterday as well at 1.16.  It appears that patient does have some baseline kidney dysfunction.  Urinalysis reveals trace leukocytes and trace bacteria.  Called in Keflex and sent culture.  Precautions were given for return to the ER for any new or worsening symptoms.    Final diagnoses:   Hypertension, unspecified type   Tachycardia          Deedee Montes, ROSEMARY  08/04/24 1957

## 2024-08-04 NOTE — DISCHARGE INSTRUCTIONS
Take blood pressure medication at the same time every day.  Take your blood pressure and keep a blood pressure log to follow-up with your PCP for medication adjustments and optimization of blood pressure.  Follow the DASH diet per paperwork provided.  This can help lower blood pressure.  Drink plenty of water to stay hydrated.  Follow-up with cardiology for further outpatient evaluation of recurrent tachycardia.  Return to the ER for new or worsening symptoms or acute concerns.

## 2024-08-06 LAB — BACTERIA SPEC AEROBE CULT: NORMAL

## 2024-08-15 NOTE — PROGRESS NOTES
"    [facility]     Outpatient New Patient / Consult Note    Ashleigh Hill  [id number]  2024    Referred By: Deedee Montes PA-C    Chief Complaint   Patient presents with    Establish Care    Chest Pain    Rapid Heart Rate       Subjective     History of Present Illness:   Ashleigh Hill is a 54 y.o. female with prediabetes, hypertension, hyperlipidemia, and bipolar depression who presents to the Cardiology Clinic for evaluation of hypertension and tachycardia.  Patient was in the ER earlier this month on 2 consecutive days due to significant blood pressure elevation.  She reports her blood pressure was in the 170s systolic and she \"felt like she was going to have a heart attack.\"  However, she denies ever having any symptoms such as chest pain, dyspnea, dizziness, lightheadedness, headache, or vision changes.  She denies any orthopnea, PND, or leg swelling.  Says that the reason her blood pressure was high was because she forgot to include her losartan in her pill box for a few days.  Since then, her primary care has increased her losartan to 75 and metoprolol was started.  Patient denies any baseline exertional symptoms.  She does take walks regularly and has no exertional limitation with that.    Past Cardiac Testin. Last Coronary Angio: none  2. Last Echo: none   3. Prior Stress Testing: none  4. Prior Holter Monitor: none    Review of Systems:  Review of Systems   Constitutional: Negative.    Respiratory: Negative.     Cardiovascular: Negative.    Gastrointestinal: Negative.    Musculoskeletal: Negative.    Neurological: Negative.        Past Medical History:   Diagnosis Date    Abdominal pain     Abnormal weight gain     Anxiety     Bipolar affective disorder     Cholelithiasis     removed    Colon polyp     had over 20 polyps    Constipation     Depression     Elevated cholesterol     Elevated white blood cell count     Excessive sweating     Hematuria     " "Hemorrhoids     Hiatal hernia     History of psoriasis     History of thyroid nodule     Hyperlipidemia     Hypertension     Kidney stone     Leukocytosis     Lumbago     Migraines     Nephrolithiasis     left    Ovarian cyst     left    Plantar fasciitis     Porphyria     Prediabetes     Sleep apnea     mild no cpap    Snores     Thyroid nodule 2016    Tobacco use     Toe infection     Upper respiratory infection     Urinary incontinence 2021    Uses contact lenses     Vaginitis     Vaginitis 2016    Visit for screening mammogram        Past Surgical History:   Procedure Laterality Date    ABDOMINOPLASTY  2010    BREAST RECONSTRUCTION Bilateral 2010    Breast Lift     SECTION      x2    CHOLECYSTECTOMY      COLONOSCOPY      COLONOSCOPY N/A 10/26/2022    Procedure: COLONOSCOPY CPT CODE: 21328;  Surgeon: Lane Velazco MD;  Location: [facility] ENDOSCOPY;  Service: Gastroenterology;  Laterality: N/A;    CYSTOSCOPY W/ BULKING AGENT INJECTION N/A 2023    Procedure: CYSTOSCOPY WITH BULKING AGENT INJECTION;  Surgeon: Curry Gonzalez MD;  Location: [facility] OR;  Service: Urology;  Laterality: N/A;    ENDOMETRIAL ABLATION      ENDOSCOPY N/A 10/26/2022    Procedure: ESOPHAGOGASTRODUODENOSCOPY WITH BIOPSY CPT CODE: 36881;  Surgeon: Lane Velazco MD;  Location: [facility] ENDOSCOPY;  Service: Gastroenterology;  Laterality: N/A;    HEMORRHOIDECTOMY  2018    had 3 removed    KIDNEY STONE SURGERY      LITHOTRIPSY  2018    TOE SURGERY      \"needle removed from toe\"    TONSILLECTOMY      TUBAL ABDOMINAL LIGATION      URETEROSCOPY LASER LITHOTRIPSY WITH STENT INSERTION Right 2023    Procedure: CYSTOSCOPY RETROGRADE PYELOGRAM AND URETEROSCOPY LASER LITHOTRIPSY WITH RIGHT STENT INSERTION;  Surgeon: Curry Gonzalez MD;  Location: [facility] OR;  Service: Urology;  Laterality: Right;       Family History   Problem Relation Age of Onset    Arthritis Mother     " Diabetes Mother     Hypertension Mother     Osteoporosis Mother     Thyroid disease Mother     Hyperlipidemia Mother     Colon polyps Mother     Cancer Father     Kidney disease Father     Breast cancer Maternal Aunt     Colon cancer Neg Hx     Ovarian cancer Neg Hx        Social History     Socioeconomic History    Marital status:    Tobacco Use    Smoking status: Former     Current packs/day: 0.00     Average packs/day: 1 pack/day for 35.3 years (35.3 ttl pk-yrs)     Types: Cigarettes     Start date: 8/1/1985     Quit date: 11/1/2020     Years since quitting: 3.7    Smokeless tobacco: Never   Vaping Use    Vaping status: Never Used   Substance and Sexual Activity    Alcohol use: Not Currently     Comment: rarely 2 times per year    Drug use: Never    Sexual activity: Yes     Partners: Male     Birth control/protection: Tubal ligation         Current Outpatient Medications:     buPROPion XL (WELLBUTRIN XL) 300 MG 24 hr tablet, Take 1 tablet by mouth Daily., Disp: , Rfl:     Cholecalciferol (Vitamin D) 50 MCG (2000 UT) tablet, 1 tablet Daily., Disp: , Rfl:     ezetimibe (ZETIA) 10 MG tablet, Take 1 tablet by mouth Daily., Disp: , Rfl:     FLUoxetine (PROzac) 40 MG capsule, Take  by mouth Daily., Disp: , Rfl:     Linzess 72 MCG capsule capsule, Take 1 capsule by mouth Every Morning Before Breakfast., Disp: 90 capsule, Rfl: 3    loratadine (CLARITIN) 10 MG tablet, Take  by mouth Daily., Disp: , Rfl:     losartan (COZAAR) 50 MG tablet, Take 1.5 tablets by mouth Daily., Disp: , Rfl:     metoprolol tartrate (LOPRESSOR) 25 MG tablet, Take 1 tablet by mouth 2 (Two) Times a Day., Disp: , Rfl:     Mounjaro 15 MG/0.5ML solution pen-injector, 1 (One) Time Per Week., Disp: , Rfl:     pantoprazole (PROTONIX) 40 MG EC tablet, Take 1 tablet by mouth Every Morning Before Breakfast., Disp: 90 tablet, Rfl: 3    Taltz 80 MG/ML solution auto-injector, Every 30 (Thirty) Days., Disp: , Rfl:     Vraylar 1.5 MG capsule capsule,  "Take  by mouth Daily., Disp: , Rfl:     famotidine (PEPCID) 40 MG tablet, , Disp: , Rfl:     Allergies   Allergen Reactions    Bee Venom Anaphylaxis    Codeine Hives          Objective     Vitals:  Vitals:    08/16/24 1104   BP: 110/68   BP Location: Left arm   Patient Position: Sitting   Pulse: 88   SpO2: 97%   Weight: 78.5 kg (173 lb)   Height: 162.6 cm (64.02\")       Physical Exam:  Vitals reviewed.   Constitutional:       Appearance: Healthy appearance. Not in distress.   Neck:      Vascular: No JVD.   Pulmonary:      Effort: Pulmonary effort is normal.      Breath sounds: Normal breath sounds.   Cardiovascular:      Normal rate. Regular rhythm. Normal S1. Normal S2.       Murmurs: There is no murmur.      No gallop.  No click. No rub.   Pulses:     Intact distal pulses.   Edema:     Peripheral edema absent.   Abdominal:      General: There is no distension.      Palpations: Abdomen is soft.      Tenderness: There is no abdominal tenderness.   Skin:     General: Skin is warm and dry.         Results Review:   I reviewed the patient's new clinical results.  I reviewed the patient's new imaging results and agree with the interpretation.  I reviewed the patient's other test results and agree with the interpretation  I personally viewed and interpreted the patient's EKG/Telemetry data    Comprehensive Metabolic Panel (08/04/2024 17:38)  CBC & Differential (08/04/2024 17:38)  TSH (08/03/2024 11:02)  Magnesium (08/03/2024 11:02)  Single High Sensitivity Troponin T (08/03/2024 11:02)    Lipid Panel (07/18/2023 13:34)  Hemoglobin A1c (07/18/2023 13:34)      ECG 12 Lead    Date/Time: 8/16/2024 11:43 AM  Performed by: Harinder Dunbar MD    Authorized by: Harinder Dunbar MD  Comparison: compared with previous ECG from 8/4/2024  Similar to previous ECG  Rhythm: sinus rhythm  Rate: normal  BPM: 88  Conduction: conduction normal  ST Segments: ST segments normal  T Waves: T waves normal  QRS axis: normal  Other: no other " findings    Clinical impression: normal ECG             Assessment & Plan   Diagnoses and all orders for this visit:    1. Primary hypertension (Primary)  Blood pressures are now well-controlled per home blood pressure log.  Goal less than 130/80.  Clinical exam is unremarkable with no signs of heart failure.  Patient has no baseline exertional symptoms to warrant functional testing at this time.  Troponin was normal in the ER.  ECG here and in the ER normal.  -- Continue current dose of losartan and metoprolol  -- Will check echo to screen for hypertensive heart disease  -- Encouraged her to continue exercising regularly, focusing on weight loss, with dietary modification including decreasing salt intake  -- Going to have labs with primary care.  Will need to have her potassium levels checked after recent increase in losartan.  Also appears to have a bit of baseline CKD with an elevated creatinine.    2. OSORIO (obstructive sleep apnea)  Reports she has a mild form of sleep apnea and does not have a CPAP.  Has not been evaluated in a few years.  Recent blood pressure spike is likely due to accidentally missed medications.  However, may be reasonable to have her sleep apnea reevaluated to determine whether she needs a CPAP as untreated sleep apnea could lead to blood pressure elevation.  Will defer to primary care at this time.    3. Hyperlipidemia LDL goal <70  Patient reports she is only prediabetic.  Has been on ezetimibe with an LDL in the 90s.    -- Recommend targeting an LDL less than 70 considering her age and risk factors.  -- Patient is going to have primary care labs in the coming weeks.  Requesting that these be forwarded to us for review.  If LDL is not at goal, would switch ezetimibe to a statin.            Plan   Orders Placed This Encounter   Procedures    ECG 12 Lead     This order was created via procedure documentation     Order Specific Question:   Release to patient     Answer:   Routine Release  [[id number]]    Adult Transthoracic Echo Complete W/ Cont if Necessary Per Protocol     Standing Status:   Future     Standing Expiration Date:   8/16/2025     Order Specific Question:   Reason for exam?     Answer:   Other Reasons     Order Specific Question:   Other reason(s)?     Answer:   Additional Reasons     Order Specific Question:   Additional reason(s)?     Answer:   Reasons Uncertain in Most Circumstances     Order Specific Question:   Other Reasons (uncertain in most circumstances)     Answer:   Known Hypertension with Change in Medical Therapy     Order Specific Question:   Release to patient     Answer:   Routine Release [[id number]]     Medications:    Preventative Cardiology:   Tobacco Cessation: N/A  Obstructive Sleep Apnea Screening: Completed  AAA Screening: Deffered  Peripheral Arterial Disease Screening: Deffered        Follow Up:   Return in about 3 months (around 11/16/2024).    Thank you for allowing me to participate in the care of your patient. Please to not hesitate to contact me with additional questions or concerns.    Harinder Dunbar MD  08/16/2024   13:44 EDT

## 2024-08-16 ENCOUNTER — OFFICE VISIT (OUTPATIENT)
Dept: CARDIOLOGY | Facility: CLINIC | Age: 54
End: 2024-08-16
Payer: MEDICAID

## 2024-08-16 VITALS
OXYGEN SATURATION: 97 % | DIASTOLIC BLOOD PRESSURE: 68 MMHG | SYSTOLIC BLOOD PRESSURE: 110 MMHG | WEIGHT: 173 LBS | HEART RATE: 88 BPM | HEIGHT: 64 IN | BODY MASS INDEX: 29.53 KG/M2

## 2024-08-16 DIAGNOSIS — E78.5 HYPERLIPIDEMIA LDL GOAL <70: Chronic | ICD-10-CM

## 2024-08-16 DIAGNOSIS — G47.33 OSA (OBSTRUCTIVE SLEEP APNEA): Chronic | ICD-10-CM

## 2024-08-16 DIAGNOSIS — I10 PRIMARY HYPERTENSION: Primary | Chronic | ICD-10-CM

## 2024-08-28 ENCOUNTER — HOSPITAL ENCOUNTER (OUTPATIENT)
Dept: CT IMAGING | Facility: HOSPITAL | Age: 54
Discharge: HOME | End: 2024-08-28
Payer: MEDICAID

## 2024-08-28 DIAGNOSIS — Z12.2 SCREENING FOR LUNG CANCER: ICD-10-CM

## 2024-08-28 DIAGNOSIS — Z87.891 PERSONAL HISTORY OF TOBACCO USE, PRESENTING HAZARDS TO HEALTH: ICD-10-CM

## 2024-08-28 PROCEDURE — 71271 CT THORAX LUNG CANCER SCR C-: CPT

## 2024-09-03 RX ORDER — FAMOTIDINE 40 MG/1
40 TABLET, FILM COATED ORAL NIGHTLY
Qty: 90 TABLET | Refills: 1 | Status: SHIPPED | OUTPATIENT
Start: 2024-09-03

## 2024-10-28 DIAGNOSIS — K21.9 GASTROESOPHAGEAL REFLUX DISEASE WITHOUT ESOPHAGITIS: ICD-10-CM

## 2024-10-28 RX ORDER — PANTOPRAZOLE SODIUM 40 MG/1
40 TABLET, DELAYED RELEASE ORAL
Qty: 90 TABLET | Refills: 3 | Status: SHIPPED | OUTPATIENT
Start: 2024-10-28

## 2024-11-04 ENCOUNTER — TRANSCRIBE ORDERS (OUTPATIENT)
Dept: ADMINISTRATIVE | Facility: HOSPITAL | Age: 54
End: 2024-11-04
Payer: MEDICAID

## 2024-11-04 DIAGNOSIS — Z12.31 SCREENING MAMMOGRAM, ENCOUNTER FOR: Primary | ICD-10-CM

## 2024-11-06 ENCOUNTER — HOSPITAL ENCOUNTER (OUTPATIENT)
Dept: GENERAL RADIOLOGY | Facility: HOSPITAL | Age: 54
Discharge: HOME | End: 2024-11-06
Payer: MEDICAID

## 2024-11-06 ENCOUNTER — TRANSCRIBE ORDERS (OUTPATIENT)
Dept: GENERAL RADIOLOGY | Facility: HOSPITAL | Age: 54
End: 2024-11-06
Payer: MEDICAID

## 2024-11-06 DIAGNOSIS — M79.644 PAIN IN FINGER OF RIGHT HAND: ICD-10-CM

## 2024-11-06 DIAGNOSIS — M79.644 PAIN IN FINGER OF RIGHT HAND: Primary | ICD-10-CM

## 2024-11-06 PROCEDURE — 73130 X-RAY EXAM OF HAND: CPT

## 2024-12-03 ENCOUNTER — OFFICE VISIT (OUTPATIENT)
Dept: UROLOGY | Facility: CLINIC | Age: 54
End: 2024-12-03
Payer: MEDICAID

## 2024-12-03 VITALS
OXYGEN SATURATION: 97 % | HEART RATE: 96 BPM | WEIGHT: 170 LBS | SYSTOLIC BLOOD PRESSURE: 118 MMHG | BODY MASS INDEX: 29.02 KG/M2 | HEIGHT: 64 IN | TEMPERATURE: 97.8 F | DIASTOLIC BLOOD PRESSURE: 72 MMHG

## 2024-12-03 DIAGNOSIS — N20.0 KIDNEY STONE: Primary | ICD-10-CM

## 2024-12-03 DIAGNOSIS — N39.3 STRESS INCONTINENCE: ICD-10-CM

## 2024-12-03 PROCEDURE — 3078F DIAST BP <80 MM HG: CPT | Performed by: NURSE PRACTITIONER

## 2024-12-03 PROCEDURE — 99213 OFFICE O/P EST LOW 20 MIN: CPT | Performed by: NURSE PRACTITIONER

## 2024-12-03 PROCEDURE — 3074F SYST BP LT 130 MM HG: CPT | Performed by: NURSE PRACTITIONER

## 2024-12-03 NOTE — PROGRESS NOTES
Office Visit General Established Female Patient     Patient Name: Ashleigh Hill  : 1970   MRN: [id number]     Chief Complaint:   Chief Complaint   Patient presents with    Follow-up     1 year follow up         Referring Provider: No ref. provider found    History of Present Illness: Ashleigh Hill is a 54 y.o. female with history below in assessment, who presents for follow up.   Bulkamid in  still having some leaking with sneezing or laughing but leak volume is not as much as before Bulkamid   Asymptomatic for kidney stones, denies UTIs or hematuria    Subjective      Review of System:   As noted in HPI     Past Medical History:   Past Medical History:   Diagnosis Date    Abdominal pain     Abnormal weight gain     Anxiety     Bipolar affective disorder     Cholelithiasis     removed    Colon polyp     had over 20 polyps    Constipation     Depression     Elevated cholesterol     Elevated white blood cell count     Excessive sweating     Hematuria     Hemorrhoids     Hiatal hernia     History of psoriasis     History of thyroid nodule     Hyperlipidemia     Hypertension     Kidney stone     Leukocytosis     Lumbago     Migraines     Nephrolithiasis     left    Ovarian cyst     left    Plantar fasciitis     Porphyria     Prediabetes     Sleep apnea     mild no cpap    Snores     Thyroid nodule 2016    Tobacco use     Toe infection     Upper respiratory infection     Urinary incontinence 2021    Uses contact lenses     Vaginitis     Vaginitis 2016    Visit for screening mammogram        Past Surgical History:   Past Surgical History:   Procedure Laterality Date    ABDOMINOPLASTY  2010    BREAST RECONSTRUCTION Bilateral 2010    Breast Lift     SECTION      x2    CHOLECYSTECTOMY  2011    COLONOSCOPY      COLONOSCOPY N/A 10/26/2022    Procedure: COLONOSCOPY CPT CODE: 14934;  Surgeon: Lane Velazco MD;  Location: [facility] ENDOSCOPY;  Service:  "Gastroenterology;  Laterality: N/A;    CYSTOSCOPY W/ BULKING AGENT INJECTION N/A 2023    Procedure: CYSTOSCOPY WITH BULKING AGENT INJECTION;  Surgeon: Curry Gonzalez MD;  Location: [facility] OR;  Service: Urology;  Laterality: N/A;    ENDOMETRIAL ABLATION      ENDOSCOPY N/A 10/26/2022    Procedure: ESOPHAGOGASTRODUODENOSCOPY WITH BIOPSY CPT CODE: 22265;  Surgeon: Lane Velazco MD;  Location: [facility] ENDOSCOPY;  Service: Gastroenterology;  Laterality: N/A;    HEMORRHOIDECTOMY  2018    had 3 removed    KIDNEY STONE SURGERY      LITHOTRIPSY  2018    TOE SURGERY      \"needle removed from toe\"    TONSILLECTOMY      TUBAL ABDOMINAL LIGATION  1993    URETEROSCOPY LASER LITHOTRIPSY WITH STENT INSERTION Right 2023    Procedure: CYSTOSCOPY RETROGRADE PYELOGRAM AND URETEROSCOPY LASER LITHOTRIPSY WITH RIGHT STENT INSERTION;  Surgeon: Curry Gonzalez MD;  Location: [facility] OR;  Service: Urology;  Laterality: Right;       Family History:   Family History   Problem Relation Age of Onset    Arthritis Mother     Diabetes Mother     Hypertension Mother     Osteoporosis Mother     Thyroid disease Mother     Hyperlipidemia Mother     Colon polyps Mother     Cancer Father     Kidney disease Father     Breast cancer Maternal Aunt     Colon cancer Neg Hx     Ovarian cancer Neg Hx        Social History:   Social History     Socioeconomic History    Marital status:    Tobacco Use    Smoking status: Former     Current packs/day: 0.00     Average packs/day: 1 pack/day for 35.3 years (35.3 ttl pk-yrs)     Types: Cigarettes     Start date: 1985     Quit date: 2020     Years since quittin.0     Passive exposure: Past    Smokeless tobacco: Never   Vaping Use    Vaping status: Never Used   Substance and Sexual Activity    Alcohol use: Not Currently     Comment: rarely 2 times per year    Drug use: Never    Sexual activity: Yes     Partners: Male     Birth control/protection: Tubal ligation " "      Medications:     Current Outpatient Medications:     buPROPion XL (WELLBUTRIN XL) 300 MG 24 hr tablet, Take 1 tablet by mouth Daily., Disp: , Rfl:     Cholecalciferol (Vitamin D) 50 MCG (2000 UT) tablet, 1 tablet Daily., Disp: , Rfl:     ezetimibe (ZETIA) 10 MG tablet, Take 1 tablet by mouth Daily., Disp: , Rfl:     famotidine (PEPCID) 40 MG tablet, TAKE ONE TABLET BY MOUTH ONCE NIGHTLY, Disp: 90 tablet, Rfl: 1    FLUoxetine (PROzac) 40 MG capsule, Take  by mouth Daily., Disp: , Rfl:     Linzess 72 MCG capsule capsule, Take 1 capsule by mouth Every Morning Before Breakfast., Disp: 90 capsule, Rfl: 3    loratadine (CLARITIN) 10 MG tablet, Take  by mouth Daily., Disp: , Rfl:     losartan (COZAAR) 50 MG tablet, Take 1.5 tablets by mouth Daily., Disp: , Rfl:     metoprolol tartrate (LOPRESSOR) 25 MG tablet, Take 1 tablet by mouth 2 (Two) Times a Day., Disp: , Rfl:     Mounjaro 15 MG/0.5ML solution pen-injector, 1 (One) Time Per Week., Disp: , Rfl:     pantoprazole (PROTONIX) 40 MG EC tablet, TAKE ONE TABLET BY MOUTH EVERY MORNING BEFORE BREAKFAST, Disp: 90 tablet, Rfl: 3    Taltz 80 MG/ML solution auto-injector, Every 30 (Thirty) Days., Disp: , Rfl:     Vraylar 1.5 MG capsule capsule, Take  by mouth Daily., Disp: , Rfl:     Allergies:   Allergies   Allergen Reactions    Bee Venom Anaphylaxis    Codeine Hives       Objective     Physical Exam:   Vital Signs:   Visit Vitals  /72 (BP Location: Right arm, Patient Position: Sitting, Cuff Size: Adult)   Pulse 96   Temp 97.8 °F (36.6 °C) (Temporal)   Ht 162.6 cm (64\")   Wt 77.1 kg (170 lb)   SpO2 97%   BMI 29.18 kg/m²      Body mass index is 29.18 kg/m².     Physical Exam  Vitals and nursing note reviewed.   Constitutional:       General: She is not in acute distress.     Appearance: Normal appearance. She is overweight. She is not ill-appearing.   Pulmonary:      Effort: Pulmonary effort is normal. No respiratory distress.   Skin:     General: Skin is warm and " dry.   Neurological:      General: No focal deficit present.      Mental Status: She is alert and oriented to person, place, and time.   Psychiatric:         Mood and Affect: Mood normal.         Behavior: Behavior normal.          Labs  Brief Urine Lab Results  (Last result in the past 365 days)        Color   Clarity   Blood   Leuk Est   Nitrite   Protein   CREAT   Urine HCG        08/04/24 1920 Yellow   Cloudy   Negative   Trace   Negative   Negative                   Lab Results   Component Value Date    GLUCOSE 97 08/04/2024    CALCIUM 10.5 08/04/2024     08/04/2024    K 3.9 08/04/2024    CO2 24.6 08/04/2024     08/04/2024    BUN 17 08/04/2024    CREATININE 1.16 (H) 08/04/2024    EGFRIFNONA 54 (L) 09/24/2021    BCR 14.7 08/04/2024    ANIONGAP 11.4 08/04/2024       Lab Results   Component Value Date    WBC 15.26 (H) 08/04/2024    HGB 14.8 08/04/2024    HCT 44.1 08/04/2024    MCV 90.0 08/04/2024     08/04/2024       Urine Culture          8/4/2024    19:20   Urine Culture   Urine Culture <25,000 CFU/mL Normal Urogenital Aleyda         Radiographic Studies  XR Hand 3+ View Right    Result Date: 11/6/2024  No acute fracture.  Chronic changes as above.       Images were reviewed, interpreted, and dictated by Dr. Tammy Espino MD Transcribed by Venecia Paredes PA-C.  This report was signed and finalized on 11/6/2024 3:18 PM by Tammy Espino MD.      CT Chest Low Dose Cancer Screening WO    Result Date: 8/29/2024  Few tiny bilateral noncalcified pulmonary nodules. No evidence of primary lung neoplasm. Lung RADS category 2.  RECOMMENDATIONS:Annual low-dose chest CT.    Images were reviewed, interpreted, and dictated by Dr. Vipul Rose MD Transcribed by Venecia Paredes PA-C.  This report was signed and finalized on 8/29/2024 1:24 PM by Vipul Rose MD.        I have reviewed the above labs and imaging.     Assessment / Plan      Assessment/Plan:   Diagnoses and all orders for this visit:    1.  Kidney stone (Primary)  -     XR Abdomen KUB; Future    2. Stress incontinence    54-year-old female here to follow-up with history of kidney stones and JESSICA after treatment with Bulkamid 11/2023.  She has been asymptomatic for kidney stones will have patient obtain a KUB to monitor for stone burden.  We discussed she continues to have JESSICA after Bulkamid volume is much lighter she could try pelvic floor physical therapy or mid urethral sling.  At this time she feels her incontinence is manageable and does not wish for further interventions.  She is reminded Bulkamid last 5 to 7 years and may need to be repeated afterwards.    KUB within the week and plan for KUB 1 year  Patient will continue to monitor and will discuss options if JESSICA increases            Follow Up:   Return in about 1 year (around 12/3/2025) for Follow up Sandip.    RADHA Bright, NP-C  [facility] [place]

## 2024-12-04 ENCOUNTER — TRANSCRIBE ORDERS (OUTPATIENT)
Dept: GENERAL RADIOLOGY | Facility: HOSPITAL | Age: 54
End: 2024-12-04
Payer: MEDICAID

## 2024-12-04 ENCOUNTER — APPOINTMENT (OUTPATIENT)
Dept: GENERAL RADIOLOGY | Facility: HOSPITAL | Age: 54
End: 2024-12-04
Payer: MEDICAID

## 2024-12-04 ENCOUNTER — LAB (OUTPATIENT)
Dept: LAB | Facility: HOSPITAL | Age: 54
End: 2024-12-04
Payer: MEDICAID

## 2024-12-04 ENCOUNTER — TRANSCRIBE ORDERS (OUTPATIENT)
Dept: LAB | Facility: HOSPITAL | Age: 54
End: 2024-12-04
Payer: MEDICAID

## 2024-12-04 ENCOUNTER — HOSPITAL ENCOUNTER (OUTPATIENT)
Dept: GENERAL RADIOLOGY | Facility: HOSPITAL | Age: 54
Discharge: HOME | End: 2024-12-04
Payer: MEDICAID

## 2024-12-04 DIAGNOSIS — N20.0 KIDNEY STONE: ICD-10-CM

## 2024-12-04 DIAGNOSIS — L40.0 PSORIASIS VULGARIS: Primary | ICD-10-CM

## 2024-12-04 DIAGNOSIS — L40.0 PSORIASIS VULGARIS: ICD-10-CM

## 2024-12-04 DIAGNOSIS — J43.9 PULMONARY EMPHYSEMA, UNSPECIFIED EMPHYSEMA TYPE: ICD-10-CM

## 2024-12-04 DIAGNOSIS — L40.1 GENERALIZED PUSTULAR PSORIASIS: ICD-10-CM

## 2024-12-04 DIAGNOSIS — J43.9 PULMONARY EMPHYSEMA, UNSPECIFIED EMPHYSEMA TYPE: Primary | ICD-10-CM

## 2024-12-04 LAB
ALBUMIN SERPL-MCNC: 4.1 G/DL (ref 3.5–5.2)
ALBUMIN/GLOB SERPL: 1.3 G/DL
ALP SERPL-CCNC: 97 U/L (ref 39–117)
ALT SERPL W P-5'-P-CCNC: 21 U/L (ref 1–33)
ANION GAP SERPL CALCULATED.3IONS-SCNC: 11.5 MMOL/L (ref 5–15)
AST SERPL-CCNC: 18 U/L (ref 1–32)
BASOPHILS # BLD AUTO: 0.13 10*3/MM3 (ref 0–0.2)
BASOPHILS NFR BLD AUTO: 1 % (ref 0–1.5)
BILIRUB CONJ SERPL-MCNC: 0.1 MG/DL (ref 0–0.3)
BILIRUB SERPL-MCNC: 0.3 MG/DL (ref 0–1.2)
BUN SERPL-MCNC: 16 MG/DL (ref 6–20)
BUN/CREAT SERPL: 15.2 (ref 7–25)
CALCIUM SPEC-SCNC: 9.7 MG/DL (ref 8.6–10.5)
CHLORIDE SERPL-SCNC: 104 MMOL/L (ref 98–107)
CO2 SERPL-SCNC: 22.5 MMOL/L (ref 22–29)
CREAT SERPL-MCNC: 1.05 MG/DL (ref 0.57–1)
DEPRECATED RDW RBC AUTO: 38 FL (ref 37–54)
EGFRCR SERPLBLD CKD-EPI 2021: 62.9 ML/MIN/1.73
EOSINOPHIL # BLD AUTO: 0.74 10*3/MM3 (ref 0–0.4)
EOSINOPHIL NFR BLD AUTO: 5.6 % (ref 0.3–6.2)
ERYTHROCYTE [DISTWIDTH] IN BLOOD BY AUTOMATED COUNT: 11.7 % (ref 12.3–15.4)
GLOBULIN UR ELPH-MCNC: 3.2 GM/DL
GLUCOSE SERPL-MCNC: 106 MG/DL (ref 65–99)
HCT VFR BLD AUTO: 42.7 % (ref 34–46.6)
HGB BLD-MCNC: 14.4 G/DL (ref 12–15.9)
IMM GRANULOCYTES # BLD AUTO: 0.04 10*3/MM3 (ref 0–0.05)
IMM GRANULOCYTES NFR BLD AUTO: 0.3 % (ref 0–0.5)
LYMPHOCYTES # BLD AUTO: 5.01 10*3/MM3 (ref 0.7–3.1)
LYMPHOCYTES NFR BLD AUTO: 37.8 % (ref 19.6–45.3)
MCH RBC QN AUTO: 30.2 PG (ref 26.6–33)
MCHC RBC AUTO-ENTMCNC: 33.7 G/DL (ref 31.5–35.7)
MCV RBC AUTO: 89.5 FL (ref 79–97)
MONOCYTES # BLD AUTO: 0.62 10*3/MM3 (ref 0.1–0.9)
MONOCYTES NFR BLD AUTO: 4.7 % (ref 5–12)
NEUTROPHILS NFR BLD AUTO: 50.6 % (ref 42.7–76)
NEUTROPHILS NFR BLD AUTO: 6.71 10*3/MM3 (ref 1.7–7)
NRBC BLD AUTO-RTO: 0 /100 WBC (ref 0–0.2)
PLATELET # BLD AUTO: 382 10*3/MM3 (ref 140–450)
PMV BLD AUTO: 9.5 FL (ref 6–12)
POTASSIUM SERPL-SCNC: 4 MMOL/L (ref 3.5–5.2)
PROT SERPL-MCNC: 7.3 G/DL (ref 6–8.5)
RBC # BLD AUTO: 4.77 10*6/MM3 (ref 3.77–5.28)
SODIUM SERPL-SCNC: 138 MMOL/L (ref 136–145)
WBC NRBC COR # BLD AUTO: 13.25 10*3/MM3 (ref 3.4–10.8)

## 2024-12-04 PROCEDURE — 71046 X-RAY EXAM CHEST 2 VIEWS: CPT

## 2024-12-04 PROCEDURE — 85025 COMPLETE CBC W/AUTO DIFF WBC: CPT

## 2024-12-04 PROCEDURE — 82248 BILIRUBIN DIRECT: CPT

## 2024-12-04 PROCEDURE — 36415 COLL VENOUS BLD VENIPUNCTURE: CPT

## 2024-12-04 PROCEDURE — 80053 COMPREHEN METABOLIC PANEL: CPT

## 2024-12-04 PROCEDURE — 86480 TB TEST CELL IMMUN MEASURE: CPT

## 2024-12-04 PROCEDURE — 74018 RADEX ABDOMEN 1 VIEW: CPT

## 2024-12-06 LAB
GAMMA INTERFERON BACKGROUND BLD IA-ACNC: 0.07 IU/ML
M TB IFN-G BLD-IMP: NEGATIVE
M TB IFN-G CD4+ BCKGRND COR BLD-ACNC: 0.09 IU/ML
M TB IFN-G CD4+CD8+ BCKGRND COR BLD-ACNC: 0.08 IU/ML
MITOGEN IGNF BCKGRD COR BLD-ACNC: >10 IU/ML
QUANTIFERON INCUBATION: NORMAL
SERVICE CMNT-IMP: NORMAL

## 2025-01-22 DIAGNOSIS — K59.04 CHRONIC IDIOPATHIC CONSTIPATION: ICD-10-CM

## 2025-01-22 RX ORDER — LINACLOTIDE 72 UG/1
72 CAPSULE, GELATIN COATED ORAL
Qty: 90 CAPSULE | Refills: 1 | Status: SHIPPED | OUTPATIENT
Start: 2025-01-22

## 2025-02-24 ENCOUNTER — HOSPITAL ENCOUNTER (OUTPATIENT)
Dept: MAMMOGRAPHY | Facility: HOSPITAL | Age: 55
Discharge: HOME | End: 2025-02-24
Payer: MEDICAID

## 2025-02-24 DIAGNOSIS — Z12.31 SCREENING MAMMOGRAM, ENCOUNTER FOR: ICD-10-CM

## 2025-02-24 PROCEDURE — 77067 SCR MAMMO BI INCL CAD: CPT

## 2025-02-24 PROCEDURE — 77063 BREAST TOMOSYNTHESIS BI: CPT

## 2025-02-27 ENCOUNTER — TRANSCRIBE ORDERS (OUTPATIENT)
Dept: ADMINISTRATIVE | Facility: HOSPITAL | Age: 55
End: 2025-02-27
Payer: MEDICAID

## 2025-02-27 DIAGNOSIS — R92.8 ABNORMAL MAMMOGRAM: Primary | ICD-10-CM

## 2025-03-06 ENCOUNTER — TRANSCRIBE ORDERS (OUTPATIENT)
Dept: GENERAL RADIOLOGY | Facility: HOSPITAL | Age: 55
End: 2025-03-06

## 2025-03-06 DIAGNOSIS — R92.8 ABNORMAL MAMMOGRAM: Primary | ICD-10-CM

## 2025-03-10 ENCOUNTER — HOSPITAL ENCOUNTER (OUTPATIENT)
Dept: ULTRASOUND IMAGING | Facility: HOSPITAL | Age: 55
Discharge: HOME | End: 2025-03-10
Payer: MEDICAID

## 2025-03-10 DIAGNOSIS — R92.8 ABNORMAL MAMMOGRAM: ICD-10-CM

## 2025-03-10 PROCEDURE — 76642 ULTRASOUND BREAST LIMITED: CPT

## 2025-04-17 ENCOUNTER — OFFICE VISIT (OUTPATIENT)
Dept: GASTROENTEROLOGY | Facility: CLINIC | Age: 55
End: 2025-04-17
Payer: MEDICAID

## 2025-04-17 VITALS
DIASTOLIC BLOOD PRESSURE: 86 MMHG | HEART RATE: 101 BPM | SYSTOLIC BLOOD PRESSURE: 122 MMHG | WEIGHT: 199.2 LBS | OXYGEN SATURATION: 96 % | BODY MASS INDEX: 34.19 KG/M2

## 2025-04-17 DIAGNOSIS — K59.04 CHRONIC IDIOPATHIC CONSTIPATION: ICD-10-CM

## 2025-04-17 DIAGNOSIS — K21.9 GASTROESOPHAGEAL REFLUX DISEASE WITHOUT ESOPHAGITIS: ICD-10-CM

## 2025-04-17 PROCEDURE — 1159F MED LIST DOCD IN RCRD: CPT | Performed by: PHYSICIAN ASSISTANT

## 2025-04-17 PROCEDURE — 3074F SYST BP LT 130 MM HG: CPT | Performed by: PHYSICIAN ASSISTANT

## 2025-04-17 PROCEDURE — 99214 OFFICE O/P EST MOD 30 MIN: CPT | Performed by: PHYSICIAN ASSISTANT

## 2025-04-17 PROCEDURE — 3079F DIAST BP 80-89 MM HG: CPT | Performed by: PHYSICIAN ASSISTANT

## 2025-04-17 PROCEDURE — 1160F RVW MEDS BY RX/DR IN RCRD: CPT | Performed by: PHYSICIAN ASSISTANT

## 2025-04-17 RX ORDER — TRIAMCINOLONE ACETONIDE 1 MG/G
CREAM TOPICAL
COMMUNITY
Start: 2025-02-25

## 2025-04-17 RX ORDER — PANTOPRAZOLE SODIUM 20 MG/1
20 TABLET, DELAYED RELEASE ORAL
Qty: 90 TABLET | Refills: 3 | Status: SHIPPED | OUTPATIENT
Start: 2025-04-17

## 2025-04-17 RX ORDER — ESTRADIOL 0.1 MG/G
CREAM VAGINAL
COMMUNITY
Start: 2025-03-25

## 2025-04-17 RX ORDER — LINACLOTIDE 72 UG/1
72 CAPSULE, GELATIN COATED ORAL
Qty: 90 CAPSULE | Refills: 3 | Status: SHIPPED | OUTPATIENT
Start: 2025-04-17

## 2025-04-17 NOTE — PROGRESS NOTES
Follow Up Note     Date: 2025   Patient Name: Ashleigh Hill  MRN: [id number]  : 1970     Primary Care Provider: Charisma Aparicio APRN     Chief Complaint   Patient presents with    Constipation    Heartburn     History of present illness:   2025  Ashleigh Hill is a 55 y.o. female who is here today for follow up regarding Constipation and Heartburn.    She has been overall feeling well. Takes protonix 40 mg once daily for heartburn and feels this controls her symptoms. For constipation, has been taking Linzess 72 mcg once daily and having regular stools with it. She wants to continue both these medications.     Interval History:  2022  She has been having rectal bleeding for the past 3-4 months, it is bright red in color. Blood is in the stool, on the tissue and in the toilet water. Bleeding only occurs with a BM. She has been told in the past that she has hemorrhodis. She has had a hemorrhoid surgery in the past approx 5 years. She has constipation which aggravates the bleeding. BMs are occurring every 2-3 days and hard with straining. Has tried taking dulcolax, miralax without relief. Also has associated bloating.      Having nausea with vomiting 2-3 times per week. This started approx 3 weeks ago. This is occurring mostly in the mornings, not after eating. She tried taking her medications with food as a way to help with this but she has had vomiting even before taking her medications. Has increased belching. No heartburn. No current reflux medications. She does wake up at night with sensation that something is caught in her throat. No dysphagia. Had EGD several years ago, was told she had a small hiatal hernia, completed by Dr. Landry.      Last colonoscopy was 7-8 years ago and reports having over 20 colon polyps removed then. There is no known family history of colon cancer. Mother had colon polyps.     Subjective     Past Medical History:   Diagnosis Date    Abdominal pain      "Abnormal weight gain     Anxiety     Bipolar affective disorder     Cholelithiasis     removed    Colon polyp     had over 20 polyps    Constipation     Depression     Elevated cholesterol     Elevated white blood cell count     Excessive sweating     Hematuria     Hemorrhoids     Hiatal hernia     History of psoriasis     History of thyroid nodule     Hyperlipidemia     Hypertension     Kidney stone     Leukocytosis     Lumbago     Migraines     Nephrolithiasis     left    Ovarian cyst     left    Plantar fasciitis     Porphyria     Prediabetes     Sleep apnea     mild no cpap    Snores     Thyroid nodule 2016    Tobacco use     Toe infection     Upper respiratory infection     Urinary incontinence 2021    Uses contact lenses     Vaginitis     Vaginitis 2016    Visit for screening mammogram      Past Surgical History:   Procedure Laterality Date    ABDOMINOPLASTY  2010    BREAST RECONSTRUCTION Bilateral 2010    Breast Lift     SECTION      x2    CHOLECYSTECTOMY      COLONOSCOPY      COLONOSCOPY N/A 10/26/2022    Procedure: COLONOSCOPY CPT CODE: 82305;  Surgeon: Lane Velazco MD;  Location: [facility] ENDOSCOPY;  Service: Gastroenterology;  Laterality: N/A;    CYSTOSCOPY W/ BULKING AGENT INJECTION N/A 2023    Procedure: CYSTOSCOPY WITH BULKING AGENT INJECTION;  Surgeon: Curry Gonzalez MD;  Location: [facility] OR;  Service: Urology;  Laterality: N/A;    ENDOMETRIAL ABLATION      ENDOSCOPY N/A 10/26/2022    Procedure: ESOPHAGOGASTRODUODENOSCOPY WITH BIOPSY CPT CODE: 94451;  Surgeon: Lane Velazco MD;  Location: [facility] ENDOSCOPY;  Service: Gastroenterology;  Laterality: N/A;    HEMORRHOIDECTOMY      had 3 removed    KIDNEY STONE SURGERY      LITHOTRIPSY  2018    TOE SURGERY      \"needle removed from toe\"    TONSILLECTOMY      TUBAL ABDOMINAL LIGATION  1993    URETEROSCOPY LASER LITHOTRIPSY WITH STENT INSERTION Right 2023    Procedure: " CYSTOSCOPY RETROGRADE PYELOGRAM AND URETEROSCOPY LASER LITHOTRIPSY WITH RIGHT STENT INSERTION;  Surgeon: Curry Gonzalez MD;  Location: [facility];  Service: Urology;  Laterality: Right;     Family History   Problem Relation Age of Onset    Arthritis Mother     Diabetes Mother     Hypertension Mother     Osteoporosis Mother     Thyroid disease Mother     Hyperlipidemia Mother     Colon polyps Mother     Heart disease Mother     Cancer Father     Kidney disease Father     Breast cancer Maternal Aunt     Colon cancer Neg Hx     Ovarian cancer Neg Hx      Social History     Socioeconomic History    Marital status:    Tobacco Use    Smoking status: Former     Current packs/day: 0.00     Average packs/day: 1 pack/day for 35.3 years (35.3 ttl pk-yrs)     Types: Cigarettes     Start date: 1985     Quit date: 2020     Years since quittin.4     Passive exposure: Past    Smokeless tobacco: Never   Vaping Use    Vaping status: Never Used   Substance and Sexual Activity    Alcohol use: Not Currently     Comment: rarely 2 times per year    Drug use: Not Currently    Sexual activity: Yes     Partners: Male     Birth control/protection: Tubal ligation     Current Outpatient Medications:     buPROPion XL (WELLBUTRIN XL) 300 MG 24 hr tablet, Take 1 tablet by mouth Daily., Disp: , Rfl:     Cholecalciferol (Vitamin D) 50 MCG ( UT) tablet, 1 tablet Daily., Disp: , Rfl:     Diclofenac Sodium (VOLTAREN) 1 % gel gel, , Disp: , Rfl:     estradiol (ESTRACE) 0.1 MG/GM vaginal cream, , Disp: , Rfl:     ezetimibe (ZETIA) 10 MG tablet, Take 1 tablet by mouth Daily., Disp: , Rfl:     famotidine (PEPCID) 40 MG tablet, TAKE ONE TABLET BY MOUTH ONCE NIGHTLY, Disp: 90 tablet, Rfl: 1    FLUoxetine (PROzac) 40 MG capsule, Take  by mouth Daily., Disp: , Rfl:     Linzess 72 MCG capsule capsule, TAKE ONE CAPSULE BY MOUTH EVERY MORNING BEFORE BREAKFAST, Disp: 90 capsule, Rfl: 1    loratadine (CLARITIN) 10 MG tablet, Take  by  mouth Daily., Disp: , Rfl:     losartan (COZAAR) 50 MG tablet, Take 1.5 tablets by mouth Daily., Disp: , Rfl:     metoprolol tartrate (LOPRESSOR) 25 MG tablet, Take 1 tablet by mouth 2 (Two) Times a Day., Disp: , Rfl:     pantoprazole (PROTONIX) 40 MG EC tablet, TAKE ONE TABLET BY MOUTH EVERY MORNING BEFORE BREAKFAST, Disp: 90 tablet, Rfl: 3    Taltz 80 MG/ML solution auto-injector, Every 30 (Thirty) Days., Disp: , Rfl:     triamcinolone (KENALOG) 0.1 % cream, , Disp: , Rfl:     Vraylar 1.5 MG capsule capsule, Take  by mouth Daily., Disp: , Rfl:     Allergies   Allergen Reactions    Bee Venom Anaphylaxis    Codeine Hives     The following portions of the patient's history were reviewed and updated as appropriate: allergies, current medications, past family history, past medical history, past social history, past surgical history and problem list.    Objective     Physical Exam  Constitutional:       General: She is not in acute distress.     Appearance: Normal appearance. She is well-developed. She is not diaphoretic.   HENT:      Head: Normocephalic and atraumatic.      Right Ear: External ear normal.      Left Ear: External ear normal.      Nose: Nose normal.   Eyes:      General: No scleral icterus.        Right eye: No discharge.         Left eye: No discharge.      Conjunctiva/sclera: Conjunctivae normal.   Neck:      Trachea: No tracheal deviation.   Pulmonary:      Effort: Pulmonary effort is normal. No respiratory distress.   Musculoskeletal:         General: Normal range of motion.      Cervical back: Normal range of motion.   Skin:     Coloration: Skin is not pale.      Findings: No erythema or rash.   Neurological:      Mental Status: She is alert and oriented to person, place, and time.      Coordination: Coordination normal.   Psychiatric:         Mood and Affect: Mood normal.         Behavior: Behavior normal.         Thought Content: Thought content normal.         Judgment: Judgment normal.        Vitals:    04/17/25 0939   BP: 122/86   Pulse: 101   SpO2: 96%   Weight: 90.4 kg (199 lb 3.2 oz)     Results Review:   I reviewed the patient's new clinical results.    Lab on 12/04/2024   Component Date Value Ref Range Status    Glucose 12/04/2024 106 (H)  65 - 99 mg/dL Final    BUN 12/04/2024 16  6 - 20 mg/dL Final    Creatinine 12/04/2024 1.05 (H)  0.57 - 1.00 mg/dL Final    Sodium 12/04/2024 138  136 - 145 mmol/L Final    Potassium 12/04/2024 4.0  3.5 - 5.2 mmol/L Final    Chloride 12/04/2024 104  98 - 107 mmol/L Final    CO2 12/04/2024 22.5  22.0 - 29.0 mmol/L Final    Calcium 12/04/2024 9.7  8.6 - 10.5 mg/dL Final    Total Protein 12/04/2024 7.3  6.0 - 8.5 g/dL Final    Albumin 12/04/2024 4.1  3.5 - 5.2 g/dL Final    ALT (SGPT) 12/04/2024 21  1 - 33 U/L Final    AST (SGOT) 12/04/2024 18  1 - 32 U/L Final    Alkaline Phosphatase 12/04/2024 97  39 - 117 U/L Final    Total Bilirubin 12/04/2024 0.3  0.0 - 1.2 mg/dL Final    Globulin 12/04/2024 3.2  gm/dL Final    A/G Ratio 12/04/2024 1.3  g/dL Final    BUN/Creatinine Ratio 12/04/2024 15.2  7.0 - 25.0 Final    Anion Gap 12/04/2024 11.5  5.0 - 15.0 mmol/L Final    eGFR 12/04/2024 62.9  >60.0 mL/min/1.73 Final    QuantiFERON Incubation 12/04/2024 Incubation performed.   Final    QUANTIFERON-TB GOLD PLUS 12/04/2024 Negative  Negative Final    No response to M tuberculosis antigens detected.  Infection with M tuberculosis is unlikely, but high risk  individuals should be considered for additional testing  (ATS/IDSA/CDC Clinical Practice Guidelines, 2017). The  reference range is an Antigen minus Nil result of <0.35 IU/mL.  Chemiluminescence immunoassay methodology    WBC 12/04/2024 13.25 (H)  3.40 - 10.80 10*3/mm3 Final    RBC 12/04/2024 4.77  3.77 - 5.28 10*6/mm3 Final    Hemoglobin 12/04/2024 14.4  12.0 - 15.9 g/dL Final    Hematocrit 12/04/2024 42.7  34.0 - 46.6 % Final    MCV 12/04/2024 89.5  79.0 - 97.0 fL Final    MCH 12/04/2024 30.2  26.6 - 33.0 pg Final     MCHC 12/04/2024 33.7  31.5 - 35.7 g/dL Final    RDW 12/04/2024 11.7 (L)  12.3 - 15.4 % Final    RDW-SD 12/04/2024 38.0  37.0 - 54.0 fl Final    MPV 12/04/2024 9.5  6.0 - 12.0 fL Final    Platelets 12/04/2024 382  140 - 450 10*3/mm3 Final    Bilirubin, Direct 12/04/2024 0.1  0.0 - 0.3 mg/dL Final    QuantiFERON Criteria 12/04/2024 Comment   Final    QuantiFERON-TB Gold Plus is a qualitative indirect test for  M tuberculosis infection (including disease) and is intended for use  in conjunction with risk assessment, radiography, and other medical  and diagnostic evaluations. The QuantiFERON-TB Gold Plus result is  determined by subtracting the Nil value from either TB antigen (Ag)  value. The Mitogen tube serves as a control for the test.    QUANTIFERON TB1 AG VALUE 12/04/2024 0.09  IU/mL Final    QUANTIFERON TB2 AG VALUE 12/04/2024 0.08  IU/mL Final    QuantiFERON Nil Value 12/04/2024 0.07  IU/mL Final    QuantiFERON Mitogen Value 12/04/2024 >10.00  IU/mL Final      EGD and colonoscopy by Dr. Velazco 10/26/2022:  - The oropharynx was normal.  - The Z-line was regular and was found 35 cm from the incisors.  - No gross lesions were noted in the proximal esophagus. Biopsies were taken with a cold forceps for histology.  - Bilious fluid was found in the stomach.  - Striped moderately erythematous mucosa without bleeding was found on the posterior wall of the stomach, in the gastric antrum and in the prepyloric region of the stomach. Biopsies were taken with a cold forceps for Helicobacter pylori testing.  - The duodenal bulb, first portion of the duodenum, second portion of the duodenum and third portion of the duodenum were normal. Biopsies for histology were taken with a cold forceps for evaluation of celiac disease.  - Hemorrhoids were found on perianal exam.  - A 3 mm polyp was found in the cecum. The polyp was sessile. The polyp was removed with a cold snare. Resection and retrieval were complete.  - A 4 mm polyp  was found in the hepatic flexure. The polyp was sessile. The polyp was removed with a cold snare. Resection and retrieval were complete.  - A 3 mm polyp was found in the recto-sigmoid colon. The polyp was sessile. The polyp was removed with a cold snare. Resection and retrieval were complete.  - A few small-mouthed diverticula were found in the sigmoid colon.  - Non-bleeding external and internal hemorrhoids were found. The hemorrhoids were moderate and medium-sized.  - The terminal ileum appeared normal. Biopsies were taken with a cold forceps for histology for change in bowel habit.  - Biopsies were taken with a cold forceps in the sigmoid colon, in the descending colon, in the transverse colon, in the ascending colon and in the cecum for histology.  Pathology DIAGNOSIS:   A.   SMALL BOWEL, BIOPSIES:   Benign duodenal mucosa with no diagnostic abnormality   B.   GASTRIC ANTRUM BIOPSIES:   Reactive gastropathy   C.   DISTAL ESOPHAGUS, BIOPSIES:   Mildly reactive squamous mucosa   D.   ILEUM, RANDOM BIOPSY:   Benign ileal mucosa with no diagnostic abnormality   E.   CECUM, POLYP, BIOPSIES:   Benign colonic mucosa with no diagnostic abnormality   F.   TRANSVERSE COLON, RANDOM BIOPSIES:   Benign colonic mucosa with no diagnostic abnormality   G.   COLON, HEPATIC FLEXURE, POLYP, BIOPSIES:   Tubular adenoma   H.   RECTOSIGMOID POLYP, BIOPSIES:   Tubular adenoma      CT Abdomen Pelvis Without Contrast  Result Date: 4/17/2023  Impression: 1. No acute process seen within the abdomen or pelvis. 2. Incidental left adrenal adenoma. 3. Nonobstructing left renal stone.      Assessment / Plan      1. Gastroesophageal reflux disease without esophagitis  2. Chronic idiopathic constipation  Had prior symptoms of gradually worsening nausea, belching, bloating and severe reflux at night, all resolved now. Still taking PPI daily with good control of reflux. Has not been needing to take pepcid at night. Taking Linzess 72 mcg daily for  constipation with BMs daily. 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 Previous EGD 10/2022 did show bilious fluid in the stomach, no celiac or H pylori on biopsy. CTAP 4/2023 no acute findings, showed constipation on my review. She is feeling well currently.      Low fiber diet when severe symptoms present  Continue PPI daily, will decrease dose, sent protonix 20 mg once daily  Continue Linzess 72 mcg once daily, refill sent     - Linzess 72 MCG capsule capsule; Take 1 capsule by mouth Every Morning Before Breakfast.  Dispense: 90 capsule; Refill: 3  - pantoprazole (PROTONIX) 20 MG EC tablet; Take 1 tablet by mouth Every Morning Before Breakfast.  Dispense: 90 tablet; Refill: 3       Prior history  Tubular adenoma of colon  Colonoscopy  showed 3 colon polyps which were small in size, all were tubular adenomas without high-grade dysplasia. She will need a repeat colonoscopy in 5 years, due 2027.    Follow Up:   Return in about 1 year (around 4/17/2026) for recheck GERD, recheck constipation.    Polly Pyle PA-C  [facility]  4/17/2025  16:22 EDT

## (undated) DEVICE — ADAPT/Y SCPE GATEWAY ADVNTGE

## (undated) DEVICE — FIBR LASR HOLMIUM SLIMLINE SIS EZ 365U

## (undated) DEVICE — RICH CYSTO: Brand: MEDLINE INDUSTRIES, INC.

## (undated) DEVICE — SOL IRR NACL 0.9PCT 3000ML

## (undated) DEVICE — HYBRID TUBING/CAP SET FOR OLYMPUS® SCOPES: Brand: ERBE

## (undated) DEVICE — NITINOL WIRE WITH HYDROPHILIC TIP: Brand: SENSOR

## (undated) DEVICE — Device

## (undated) DEVICE — GW AMPLTZ SUPRSTF PTFE JB .035 7X145CM

## (undated) DEVICE — VLV SXN AIR/H2O ORCAPOD3 1P/U STRL

## (undated) DEVICE — GLV SURG SENSICARE LT W/ALOE PF LF 7 STRL

## (undated) DEVICE — LUBE JELLY PK/2.75GM STRL BX/144

## (undated) DEVICE — DUAL LUMEN URETERAL CATHETER

## (undated) DEVICE — CATHETER,FOLEY,SILI-ELAST,LTX,18FR,10ML: Brand: MEDLINE

## (undated) DEVICE — SYR LL 3CC

## (undated) DEVICE — SLV SCD CALF HEMOFORCE DVT THERP REPROC MD

## (undated) DEVICE — CATHETER,URETHRAL,REDRUBBER,STRL,18FR: Brand: MEDLINE

## (undated) DEVICE — KT ORCA VLV SXN AIR/H2O W/SEAL 1P/U STRL

## (undated) DEVICE — QUICK CATCH IN-LINE SUCTION POLYP TRAP IS USED FOR SUCTION RETRIEVAL OF ENDOSCOPICALLY REMOVED POLYPS.

## (undated) DEVICE — ENDOSCOPY PORT CONNECTOR FOR OLYMPUS® SCOPES: Brand: ERBE

## (undated) DEVICE — FRCP BX RADIALJAW4 LG/CAP 2.4MM 240CM ORNG

## (undated) DEVICE — ST FLD IRR WARM

## (undated) DEVICE — ST URO THERMEDX/FLUIDSMART IRR/FLD/WARM PNT/PRESS/300MMHG